# Patient Record
Sex: FEMALE | Race: WHITE | NOT HISPANIC OR LATINO | Employment: UNEMPLOYED | URBAN - METROPOLITAN AREA
[De-identification: names, ages, dates, MRNs, and addresses within clinical notes are randomized per-mention and may not be internally consistent; named-entity substitution may affect disease eponyms.]

---

## 2017-01-10 ENCOUNTER — OFFICE VISIT (OUTPATIENT)
Dept: OBGYN | Facility: CLINIC | Age: 22
End: 2017-01-10
Attending: OBSTETRICS & GYNECOLOGY
Payer: COMMERCIAL

## 2017-01-10 ENCOUNTER — TELEPHONE (OUTPATIENT)
Dept: OBGYN | Facility: CLINIC | Age: 22
End: 2017-01-10

## 2017-01-10 VITALS
HEART RATE: 91 BPM | DIASTOLIC BLOOD PRESSURE: 76 MMHG | SYSTOLIC BLOOD PRESSURE: 120 MMHG | BODY MASS INDEX: 37.28 KG/M2 | WEIGHT: 224 LBS

## 2017-01-10 DIAGNOSIS — O09.92 HRP (HIGH RISK PREGNANCY), SECOND TRIMESTER: Primary | ICD-10-CM

## 2017-01-10 DIAGNOSIS — B37.31 YEAST INFECTION OF THE VAGINA: ICD-10-CM

## 2017-01-10 LAB
DEPRECATED CALCIDIOL+CALCIFEROL SERPL-MC: 32 UG/L (ref 20–75)
ERYTHROCYTE [DISTWIDTH] IN BLOOD BY AUTOMATED COUNT: 12.6 % (ref 10–15)
GLUCOSE 1H P 50 G GLC PO SERPL-MCNC: 112 MG/DL (ref 60–129)
HCT VFR BLD AUTO: 35.9 % (ref 35–47)
HGB BLD-MCNC: 12.1 G/DL (ref 11.7–15.7)
MCH RBC QN AUTO: 31.9 PG (ref 26.5–33)
MCHC RBC AUTO-ENTMCNC: 33.7 G/DL (ref 31.5–36.5)
MCV RBC AUTO: 95 FL (ref 78–100)
PLATELET # BLD AUTO: 151 10E9/L (ref 150–450)
RBC # BLD AUTO: 3.79 10E12/L (ref 3.8–5.2)
T PALLIDUM IGG+IGM SER QL: NEGATIVE
WBC # BLD AUTO: 11.4 10E9/L (ref 4–11)

## 2017-01-10 PROCEDURE — 25000125 ZZHC RX 250: Mod: ZF

## 2017-01-10 PROCEDURE — 87491 CHLMYD TRACH DNA AMP PROBE: CPT | Performed by: OBSTETRICS & GYNECOLOGY

## 2017-01-10 PROCEDURE — 99212 OFFICE O/P EST SF 10 MIN: CPT | Mod: 25

## 2017-01-10 PROCEDURE — 82950 GLUCOSE TEST: CPT | Performed by: OBSTETRICS & GYNECOLOGY

## 2017-01-10 PROCEDURE — 36415 COLL VENOUS BLD VENIPUNCTURE: CPT | Performed by: OBSTETRICS & GYNECOLOGY

## 2017-01-10 PROCEDURE — 90471 IMMUNIZATION ADMIN: CPT | Mod: ZF

## 2017-01-10 PROCEDURE — 86780 TREPONEMA PALLIDUM: CPT | Performed by: OBSTETRICS & GYNECOLOGY

## 2017-01-10 PROCEDURE — 85027 COMPLETE CBC AUTOMATED: CPT | Performed by: OBSTETRICS & GYNECOLOGY

## 2017-01-10 PROCEDURE — 87591 N.GONORRHOEAE DNA AMP PROB: CPT | Performed by: OBSTETRICS & GYNECOLOGY

## 2017-01-10 PROCEDURE — 82306 VITAMIN D 25 HYDROXY: CPT | Performed by: OBSTETRICS & GYNECOLOGY

## 2017-01-10 PROCEDURE — 90715 TDAP VACCINE 7 YRS/> IM: CPT | Mod: ZF

## 2017-01-10 RX ORDER — FLUCONAZOLE 150 MG/1
150 TABLET ORAL ONCE
Qty: 1 TABLET | Refills: 1 | Status: SHIPPED
Start: 2017-01-10 | End: 2017-01-10

## 2017-01-10 RX ORDER — NICOTINE 21 MG/24HR
1 PATCH, TRANSDERMAL 24 HOURS TRANSDERMAL EVERY 24 HOURS
Qty: 30 PATCH | Refills: 1 | Status: ON HOLD
Start: 2017-01-10 | End: 2017-05-15

## 2017-01-10 ASSESSMENT — PAIN SCALES - GENERAL: PAINLEVEL: NO PAIN (0)

## 2017-01-10 NOTE — MR AVS SNAPSHOT
After Visit Summary   1/10/2017    Ileana Thorne    MRN: [id number]           Patient Information     Date Of Birth          1995        Visit Information        Provider Department      1/10/2017 1:15 PM Sonya Demarco MD [facility]        Today's Diagnoses     HRP (high risk pregnancy), second trimester    -  1       Care Instructions    Make appointment for growth US ASAP, follow up visit in 1-2 weeks for extended OB    Go to lab    Take diflucan, can erpeat in 3 days if discharge not improvnig        Follow-ups after your visit        Future tests that were ordered for you today     Open Future Orders        Priority Expected Expires Ordered    Growth Ultrasound 79520 Routine  5/10/2017 1/10/2017            Who to contact     Please call your clinic at [phone] to:    Ask questions about your health    Make or cancel appointments    Discuss your medicines    Learn about your test results    Speak to your doctor   If you have compliments or concerns about an experience at your clinic, or if you wish to file a complaint, please contact [facility] Patient Relations at [phone] or email us at [email or web address]         Additional Information About Your Visit        MyChart Information     [employer] is an electronic gateway that provides easy, online access to your medical records. With BO.LT, you can request a clinic appointment, read your test results, renew a prescription or communicate with your care team.     To sign up for [employer] visit the website at [email or web address]   You will be asked to enter the access code listed below, as well as some personal information. Please follow the directions to create your username and password.     Your access code is: [email or web address]  Expires: 3/27/2017  9:00 AM     Your access code will  in 90 days. If you need help or a new code, please contact your [facility] or call [phone] for assistance.        Care EveryWhere ID     This is your Care EveryWhere ID. This could be used by other organizations to access your [place] medical records  [id number]        Your Vitals Were     Pulse Last Period Breastfeeding?             91 06/04/2016 No          Blood Pressure from Last 3 Encounters:   01/10/17 120/76   12/29/16 120/74   11/18/16 120/68    Weight from Last 3 Encounters:   01/10/17 101.606 kg (224 lb)   12/29/16 104.327 kg (230 lb)   11/18/16 102.513 kg (226 lb)              We Performed the Following     25- OH-Vitamin D     Anti Treponema     CBC with Platelets     Glucose 1 Hour          Today's Medication Changes          These changes are accurate as of: 1/10/17  1:27 PM.  If you have any questions, ask your nurse or doctor.               Stop taking these medicines if you haven't already. Please contact your care team if you have questions.     hydrOXYzine 50 MG capsule   Commonly known as:  VISTARIL   Stopped by:  Sonya Demarco MD           nitrofurantoin (macrocrystal-monohydrate) 100 MG capsule   Commonly known as:  MACROBID   Stopped by:  Sonya Demarco MD                    Primary Care Provider Office Phone # Fax #    Calixto GARDNER MD Aaron [phone]       [place] [id number]        Thank you!     Thank you for choosing [facility]  for your care. Our goal is always to provide you with excellent care. Hearing back from our patients is one way we can continue to improve our services. Please take a few minutes to complete the written survey that you may receive in the mail after your visit with us. Thank you!             Your Updated Medication List - Protect others around you: Learn how to safely use, store and throw away your medicines at www.disposemymeds.org.          This list is accurate as of: 1/10/17  1:27 PM.  Always use your most recent  med list.                   Brand Name Dispense Instructions for use    ABILIFY PO      Take 5 mg by mouth daily       ALBUTEROL IN          aspirin 81 MG tablet     90 tablet    Take 1 tablet (81 mg) by mouth daily       doxylamine 25 MG Tabs tablet    UNISOM    30 each    Take 1 tablet (25 mg) by mouth At Bedtime       GABAPENTIN PO      Take 600 mg by mouth 4 times daily       METHADONE HCL PO      Take 70 mg by mouth daily       order for DME     1 each    Maternity Belt order       prenatal multivitamin  plus iron 27-0.8 MG Tabs per tablet      Take 1 tablet by mouth daily       VIIBRYD PO      Take 40 mg by mouth daily       zolpidem 5 MG tablet    AMBIEN    30 tablet    Take 1 tablet (5 mg) by mouth nightly as needed for sleep

## 2017-01-10 NOTE — TELEPHONE ENCOUNTER
Received call from Ileana asking for nicotine patch prescription. She reports not being able to afford buying them over-the-counter. Review of EPIC shows she has an appointment in clinic today. Advised she speak with the provider when she is here in clinic and she agreed with plan.

## 2017-01-10 NOTE — Clinical Note
1/11/2017         Ileana Thorne   [address] [address]        Dear Ms. Thorne:    I am happy to inform you that your recent complete blood count was normal. Your glucose screening was negative for diabetes and your infectious disease screening was negative for syphilis, gonorrhea and chlamydia.     Please return to clinic for routine prenatal care.     If you have any questions or concerns, please call the clinic at [phone].      Sincerely,      Carolyn Magdaleno MD  OB/GYN Resident

## 2017-01-10 NOTE — PROGRESS NOTES
Return OB visit    S: Patient doing okay.  She has not been seen since 24 weeks. Has had pelvic cramping similar to menstrual cramps over the last 4-5 weeks with vaginal discharge. No vaginal bleeding. + FM.     O: See flowsheet  Pelvic: Closed cervix. White/yellow vaginal discharge.     Wet prep with budding yeast.    Ileana Thorne is a 21 year old  at 31w3d by LMP c/w 9w4d US, here for return OB visit.    1. Nausea - controlled with zofran. Was seen in ER  for N/V/D without known source of gastroenteritis.   2. Anxiety and depression - currently stable on abilify, vilazodone. Report has not been to a psychiatrist since [facility] but would like another psychiatrist. She is working on this through [place].  3. Polysubstance abuse - (heroin, marijuana, methamphetamine), sober  Since 14, living at [facility], seeking sober living arrangement after delivery. Previously on subutex/Klonopin, now on methadone 115 mg through [facility]. Also smoking a pack a day, wants nicotine patch to quit. Discussed risks of nicotine patch, patient would still like patch.  4. Chronic HTN, History of preeclampsia - on ASA 81 mg daily. BPs normal  - Baseline Cr  (Cr 0.51). HELLP labs 10/22 were WNL.  5. Fetal Wellbeing:  - Normal anatomy scan  -Growth: 11/3- 523g @21w5d.   -q4 growth US for chronic HTN/marginal cord insertion. Instructed patient to schedule next.  6. Pain: Seems consistent with sciatic nerve pain. Recommend pregnancy support belt (ordered). If that does not help, then recommend PT referral  7. Prenatal care - GCT, RPR, CBC, vitamin D ordered today. Growth US ordered today. Patient was not scheduled for extended OB visit, therefore next visit needs to go over folder. TDaP given.  8. Yeast infection, given Diflucan prescription.    Needs to return in 1-2 weeks and be scheduled for extended OB visit to go over results and folder.    Sonya Demarco MD  Obstetrics  and Gynecology PGY-4     The Patient was seen in Resident Continuity Clinic by GIULIA ALLEN  I reviewed the history & exam. Assessment and plan were jointly made.    Kari Epps MD

## 2017-01-10 NOTE — PATIENT INSTRUCTIONS
Make appointment for growth US ASAP, follow up visit in 1-2 weeks for extended OB    Go to lab    Take diflucan, can erpeat in 3 days if discharge not improvnig

## 2017-01-10 NOTE — Clinical Note
1/10/2017       RE: Ileana Thorne  [address] [address]     Dear Colleague,    Thank you for referring your patient, Ileana Thorne, [facility] Please see a copy of my visit note below.    Return OB visit    S: Patient doing okay.  She has not been seen since 24 weeks. Has had pelvic cramping similar to menstrual cramps over the last 4-5 weeks with vaginal discharge. No vaginal bleeding. + FM.     O: See flowsheet  Pelvic: Closed cervix. White/yellow vaginal discharge.     Wet prep with budding yeast.    Ileana Thorne is a 21 year old  at 31w3d by LMP c/w 9w4d US, here for return OB visit.    1. Nausea - controlled with zofran. Was seen in ER  for N/V/D without known source of gastroenteritis.   2. Anxiety and depression - currently stable on abilify, vilazodone. Report has not been to a psychiatrist since [facility] but would like another psychiatrist. She is working on this through [place].  3. Polysubstance abuse - (heroin, marijuana, methamphetamine), sober  Since 14, living at [facility], seeking sober living arrangement after delivery. Previously on subutex/Klonopin, now on methadone 115 mg through [facility]. Also smoking a pack a day, wants nicotine patch to quit. Discussed risks of nicotine patch, patient would still like patch.  4. Chronic HTN, History of preeclampsia - on ASA 81 mg daily. BPs normal  - Baseline Cr  (Cr 0.51). HELLP labs 10/22 were WNL.  5. Fetal Wellbeing:  - Normal anatomy scan  -Growth: 11/3- 523g @21w5d.   -q4 growth US for chronic HTN/marginal cord insertion. Instructed patient to schedule next.  6. Pain: Seems consistent with sciatic nerve pain. Recommend pregnancy support belt (ordered). If that does not help, then recommend PT referral  7. Prenatal care - GCT, RPR, CBC, vitamin D ordered today. Growth US ordered today. Patient was not  scheduled for extended OB visit, therefore next visit needs to go over folder. TDaP given.  8. Yeast infection, given Diflucan prescription.    Needs to return in 1-2 weeks and be scheduled for extended OB visit to go over results and folder.    Sonya Allen MD  Obstetrics and Gynecology PGY-4     The Patient was seen in Resident Continuity Clinic by SONYA ALLEN.  I reviewed the history & exam. Assessment and plan were jointly made.    Kari Epps MD

## 2017-01-11 LAB
C TRACH DNA SPEC QL NAA+PROBE: NORMAL
N GONORRHOEA DNA SPEC QL NAA+PROBE: NORMAL
SPECIMEN SOURCE: NORMAL
SPECIMEN SOURCE: NORMAL

## 2017-01-23 ENCOUNTER — TELEPHONE (OUTPATIENT)
Dept: OBGYN | Facility: CLINIC | Age: 22
End: 2017-01-23

## 2017-01-23 NOTE — TELEPHONE ENCOUNTER
Telephone message received from patient with request to have an Ambien prescription sent to [facility]. She states she is no longer at the [facility] and would like this to help her. Request will be sent to Dr. Demarco

## 2017-01-24 NOTE — TELEPHONE ENCOUNTER
Patient needs visit before Ambien can be given. She has had limited care recently. She can have Vistaril 100 mg qhs PRN  Sonya Demarco MD  Obstetrics and Gynecology PGY-4

## 2017-01-24 NOTE — TELEPHONE ENCOUNTER
Patient declines rx for vistaril at this time and states it does not work for her. She is currently using unisom and melatonin at hs with no relief.     Advised she needs to then schedule [facility] if she would like to discuss ambien prescription, forwarded her to  to schedule.

## 2017-01-30 ENCOUNTER — TELEPHONE (OUTPATIENT)
Dept: OBGYN | Facility: CLINIC | Age: 22
End: 2017-01-30

## 2017-01-30 ENCOUNTER — OFFICE VISIT (OUTPATIENT)
Dept: OBGYN | Facility: CLINIC | Age: 22
End: 2017-01-30
Attending: OBSTETRICS & GYNECOLOGY
Payer: COMMERCIAL

## 2017-01-30 DIAGNOSIS — O09.92 HRP (HIGH RISK PREGNANCY), SECOND TRIMESTER: ICD-10-CM

## 2017-01-30 PROCEDURE — 76816 OB US FOLLOW-UP PER FETUS: CPT | Mod: ZF

## 2017-01-30 NOTE — PROGRESS NOTES
21 year old female, ,presents at 34 2/7 weeks for obstetric ultrasound assessment indicated by chronic hypertension, marginal umbilical cord insertion.    Single fetus    Presentation - cephalic    USEGA = 34 3/7 weeks.  EFW = 2,532 grams, 61 % for 34 weeks.      KENNETH = 14.6cm.  FHR = 134bpm     Placenta anterior and grade 2      Findings discussed with patient.    Further studies with growth u/s q 4 wks.    BRODERICK Montejo MD, FACOG  [facility]  OB/GYN    2017  3:17 PM

## 2017-01-30 NOTE — Clinical Note
2017       RE: Ileana Thorne  [address] [address]     Dear Colleague,    Thank you for referring your patient, Ileana Thorne, to the [facility] at [facility]. Please see a copy of my visit note below.    21 year old female, ,presents at 34 2/7 weeks for obstetric ultrasound assessment indicated by chronic hypertension, marginal umbilical cord insertion.    Single fetus    Presentation - cephalic    USEGA = 34 3/7 weeks.  EFW = 2,532 grams, 61 % for 34 weeks.      KENNETH = 14.6cm.  FHR = 134bpm     Placenta anterior and grade 2      Findings discussed with patient.    Further studies with growth u/s q 4 wks.    BRODERICK Montejo MD, FACOG  [facility]  OB/GYN    2017  3:17 PM      Again, thank you for allowing me to participate in the care of your patient.      Sincerely,    [facility]

## 2017-01-30 NOTE — TELEPHONE ENCOUNTER
Pt was in clinic for ultrasound and told tech she is feeling very depressed and wanted to talk with someone.    Met with Kimberlyn who reports she has dx of PTSD and anxiety with depression. She is currently living in a treatment facility for heroin recovery. She reports she does not have a psychiatrist or therapist. Is taking Vibrid 60mg daily.     She reports depression is 7/10 on 0-10 scale, NO active thoughts of suicide. Anxiety at 10/10 on 0-10 scale. She reports 'telling everyone' how she feels to help get some services in place.    Advised she go to [facility] to get connected to services and additional medication. This was declined as she had a very active toddler with her who was becoming unhappy. Offered immediate appointment with psychologist, Eliana Pulido, and she agreed and went to [facility] to wait. Also offered to call on-call MD, Dr. Cordero, for anything further. She agreed with plan.    Psychologist agreed to see pt but when nurse went back out to [facility] Ileana had left, telling the  that she's fine to wait until Thursday (Feb 2, her next scheduled clinic appointment).    Spoke with Dr. Cordero who advised she should be seen before any further medication is ordered. Will discuss with her at appointment on Thursday.    Tried to reach Ileana but received personal voicemail.  Left message regarding Dr. Cordero recommendation. Advised again that if she is able should go to [facility]. Psychologist, Eliana Pulido, can see her Monday, Feb 6 at 12 noon. Also provided phone number and brief information for the [facility].    Asked her to call back to confirm appointment with psychologist.

## 2017-02-02 ENCOUNTER — OFFICE VISIT (OUTPATIENT)
Dept: OBGYN | Facility: CLINIC | Age: 22
End: 2017-02-02
Attending: OBSTETRICS & GYNECOLOGY
Payer: MEDICAID

## 2017-02-02 VITALS
HEART RATE: 99 BPM | HEIGHT: 65 IN | WEIGHT: 230.3 LBS | DIASTOLIC BLOOD PRESSURE: 85 MMHG | BODY MASS INDEX: 38.37 KG/M2 | SYSTOLIC BLOOD PRESSURE: 136 MMHG

## 2017-02-02 DIAGNOSIS — O09.93 HRP (HIGH RISK PREGNANCY), THIRD TRIMESTER: Primary | ICD-10-CM

## 2017-02-02 PROCEDURE — 99212 OFFICE O/P EST SF 10 MIN: CPT | Mod: ZF

## 2017-02-02 NOTE — Clinical Note
"2017       RE: Ileana Thorne  [address] [address]     Dear Colleague,    Thank you for referring your patient, Ileana Thorne, to the [facility] at [facility]. Please see a copy of my visit note below.    [facility] Return OB Visit    S: Ileana presents with her two year old son to clinic for return OB visit. She says that lately she has been feeling more depressed. She denies thoughts of self harm. Has been maintained on 60 mg vibrid and 600 QID of gabapentin, which she feels like has not been controlling her symptoms. She is meeting with a new psychiatrist hopefully this week. She also reports trouble sleeping and has tried vistaril and melatonin. She is on methadone and takes 55/60 mg split dose from [place]    O:   /85 mmHg  Pulse 99  Ht 1.651 m (5' 5\")  Wt 104.463 kg (230 lb 4.8 oz)  BMI 38.32 kg/m2  LMP 2016    See OB flowsheet    Growth US 17:  Presentation - cephalic  USEGA = 34 3/7 weeks.  EFW = 2,532 grams, 61 % for 34 weeks.  KENNETH = 14.6cm.  FHR = 134bpm  Placenta anterior and grade 2    A/P: Ileana Thorne is a 21 year old  at 34w5d by LMP c/w 9w4d US, here for return OB visit in pregnancy complicated by cHTN, polysubstance abuse on methadone, anxiety and depression and marginal cord insertion.     # Anxiety and depression - currently on abilify and vilazodone. Patient meeting with new psychiatrist soon to discuss medication regimen. Discussed resources if patient finds herself unable to cope or having suicidal ideation ([facility] on [place]).    # Polysubstance abuse - (heroin, marijuana, methamphetamine), sober  Since 14, living at [facility] outpatient [facility], seeking sober living arrangement after delivery. Previously on subutex/Klonopin, now on methadone 115 mg through [facility]. Using nicotine patch for smoking cessation. Offered for patient to meet with  today and she is interested, " although she says she doesn't have time today so will do it next time.    # Chronic HTN, History of preeclampsia - on ASA 81 mg daily. BPs normal  - Baseline Cr 8/26 (Cr 0.51). HELLP labs 10/22 were WNL.    # Fetal Wellbeing:  - Normal anatomy scan  -Growth: 1/30- 2532g at 34w2d.   -q4 growth US for chronic HTN/marginal cord insertion. Instructed patient to schedule next.    # Prenatal care - passed GCT (112). Hgb wnl, vit D wnl, RPR neg. S/p TDaP. GBS at next visit.     Return to clinic in 1 week for OB visit. Patient staffed with Dr. Bolivar.    Louise Ibarra MD  OB/GYN Resident PGY3  Pager [phone]  02/02/2017    The Patient was seen in Resident Continuity Clinic by IRMA IBARRA.  I reviewed the history & exam. Assessment and plan were jointly made.    Lidia Bolivar MD          Again, thank you for allowing me to participate in the care of your patient.      Sincerely,    Irma Ibarra MD

## 2017-02-02 NOTE — MR AVS SNAPSHOT
"              After Visit Summary   2017    Ileana Thorne    MRN: [id number]           Patient Information     Date Of Birth          1995        Visit Information        Provider Department      2017 2:30 PM Riri Bernstein MD [facility]         Follow-ups after your visit        Who to contact     Please call your clinic at [phone] to:    Ask questions about your health    Make or cancel appointments    Discuss your medicines    Learn about your test results    Speak to your doctor   If you have compliments or concerns about an experience at your clinic, or if you wish to file a complaint, please contact [facility] Patient Relations at [phone] or email us at [email or web address]         Additional Information About Your Visit        MyChart Information     [email or web address] is an electronic gateway that provides easy, online access to your medical records. With [email or web address], you can request a clinic appointment, read your test results, renew a prescription or communicate with your care team.     To sign up for [email or web address] visit the website at [email or web address]   You will be asked to enter the access code listed below, as well as some personal information. Please follow the directions to create your username and password.     Your access code is: [email or web address]  Expires: 3/27/2017  9:00 AM     Your access code will  in 90 days. If you need help or a new code, please contact your [facility] or call [phone] for assistance.        Care EveryWhere ID     This is your Care EveryWhere ID. This could be used by other organizations to access your [place] medical records  [id number]        Your Vitals Were     Pulse Height BMI (Body Mass Index) Last Period          99 1.651 m (5' 5\") 38.32 kg/m2 2016         Blood Pressure from Last 3 Encounters:   17 136/85   01/10/17 120/76 "   12/29/16 120/74    Weight from Last 3 Encounters:   02/02/17 104.463 kg (230 lb 4.8 oz)   01/10/17 101.606 kg (224 lb)   12/29/16 104.327 kg (230 lb)              Today, you had the following     No orders found for display       Primary Care Provider Office Phone # Fax #    Jembatsheva JJ Walker [phone]       [place] [address] [address] [facility] [address]        Thank you!     Thank you for choosing [facility]  for your care. Our goal is always to provide you with excellent care. Hearing back from our patients is one way we can continue to improve our services. Please take a few minutes to complete the written survey that you may receive in the mail after your visit with us. Thank you!             Your Updated Medication List - Protect others around you: Learn how to safely use, store and throw away your medicines at www.disposemymeds.org.          This list is accurate as of: 2/2/17  3:01 PM.  Always use your most recent med list.                   Brand Name Dispense Instructions for use    ABILIFY PO      Take 5 mg by mouth daily       ALBUTEROL IN          aspirin 81 MG tablet     90 tablet    Take 1 tablet (81 mg) by mouth daily       doxylamine 25 MG Tabs tablet    UNISOM    30 each    Take 1 tablet (25 mg) by mouth At Bedtime       GABAPENTIN PO      Take 600 mg by mouth 4 times daily       METHADONE HCL PO      Take 70 mg by mouth daily       nicotine 21 MG/24HR 24 hr patch    NICODERM CQ    30 patch    Place 1 patch onto the skin every 24 hours       order for DME     1 each    Maternity Belt order       prenatal multivitamin  plus iron 27-0.8 MG Tabs per tablet      Take 1 tablet by mouth daily       VIIBRYD PO      Take 40 mg by mouth daily       zolpidem 5 MG tablet    AMBIEN    30 tablet    Take 1 tablet (5 mg) by mouth nightly as needed for sleep

## 2017-02-02 NOTE — PROGRESS NOTES
"S Return OB Visit    S: Ileana presents with her two year old son to clinic for return OB visit. She says that lately she has been feeling more depressed. She denies thoughts of self harm. Has been maintained on 60 mg vibrid and 600 QID of gabapentin, which she feels like has not been controlling her symptoms. She is meeting with a new psychiatrist hopefully this week. She also reports trouble sleeping and has tried vistaril and melatonin. She is on methadone and takes 55/60 mg split dose from [place]    O:   /85 mmHg  Pulse 99  Ht 1.651 m (5' 5\")  Wt 104.463 kg (230 lb 4.8 oz)  BMI 38.32 kg/m2  LMP 2016    See OB flowsheet    Growth US 17:  Presentation - cephalic  USEGA = 34 3/7 weeks.  EFW = 2,532 grams, 61 % for 34 weeks.  KENNETH = 14.6cm.  FHR = 134bpm  Placenta anterior and grade 2    A/P: Ileana Thorne is a 21 year old  at 34w5d by LMP c/w 9w4d US, here for return OB visit in pregnancy complicated by cHTN, polysubstance abuse on methadone, anxiety and depression and marginal cord insertion.     # Anxiety and depression - currently on abilify and vilazodone. Patient meeting with new psychiatrist soon to discuss medication regimen. Discussed resources if patient finds herself unable to cope or having suicidal ideation ([facility] on [place]).    # Polysubstance abuse - (heroin, marijuana, methamphetamine), sober  Since 14, living at [facility] [place], seeking sober living arrangement after delivery. Previously on subutex/Klonopin, now on methadone 115 mg through [facility]. Using nicotine patch for smoking cessation. Offered for patient to meet with  today and she is interested, although she says she doesn't have time today so will do it next time.    # Chronic HTN, History of preeclampsia - on ASA 81 mg daily. BPs normal  - Baseline Cr  (Cr 0.51). HELLP labs 10/22 were WNL.    # Fetal Wellbeing:  - Normal anatomy scan  -Growth: - 2532g at 34w2d.   -q4 " growth US for chronic HTN/marginal cord insertion. Instructed patient to schedule next.    # Prenatal care - passed GCT (112). Hgb wnl, vit D wnl, RPR neg. S/p TDaP. GBS at next visit.     Return to clinic in 1 week for OB visit. Patient staffed with Dr. Bolivar.    Louise Ibarra MD  OB/GYN Resident PGY3  Pager [phone]  02/02/2017    The Patient was seen in Resident Continuity Clinic by IRMA IBARRA.  I reviewed the history & exam. Assessment and plan were jointly made.    Lidia Bolivar MD

## 2017-02-02 NOTE — NURSING NOTE
Chief Complaint   Patient presents with     Prenatal Care     34 weeks and 5 days       Raysa Lopez, [facility] 2/2/2017

## 2017-02-03 NOTE — TELEPHONE ENCOUNTER
Tried to reach Ileana but received voicemail.  Left message to call back regarding if she would like to see psychologist on Monday, Feb 6.

## 2017-02-09 ENCOUNTER — TELEPHONE (OUTPATIENT)
Dept: OBGYN | Facility: CLINIC | Age: 22
End: 2017-02-09

## 2017-02-09 DIAGNOSIS — O92.70 LACTATION DISORDER: Primary | ICD-10-CM

## 2017-02-09 RX ORDER — BREAST PUMP
1 EACH MISCELLANEOUS DAILY PRN
Qty: 1 EACH | Refills: 0 | Status: ON HOLD | OUTPATIENT
Start: 2017-02-09 | End: 2017-05-15

## 2017-02-09 NOTE — TELEPHONE ENCOUNTER
Telephone call from [employer] calling on behalf of Ileana, requesting a breast pump order be faxed to them at [phone]. Printed, signed and faxed as requested.

## 2017-02-18 ENCOUNTER — ANESTHESIA (OUTPATIENT)
Dept: OBGYN | Facility: CLINIC | Age: 22
End: 2017-02-18
Payer: MEDICAID

## 2017-02-18 ENCOUNTER — ANESTHESIA EVENT (OUTPATIENT)
Dept: OBGYN | Facility: CLINIC | Age: 22
End: 2017-02-18
Payer: MEDICAID

## 2017-02-18 ENCOUNTER — HOSPITAL ENCOUNTER (INPATIENT)
Facility: CLINIC | Age: 22
LOS: 3 days | Discharge: HOME | End: 2017-02-21
Attending: OBSTETRICS & GYNECOLOGY | Admitting: OBSTETRICS & GYNECOLOGY
Payer: MEDICAID

## 2017-02-18 ENCOUNTER — TELEPHONE (OUTPATIENT)
Dept: OTHER | Facility: CLINIC | Age: 22
End: 2017-02-18

## 2017-02-18 DIAGNOSIS — Z30.8 ENCOUNTER FOR OTHER CONTRACEPTIVE MANAGEMENT: ICD-10-CM

## 2017-02-18 DIAGNOSIS — F41.9 ANXIETY: ICD-10-CM

## 2017-02-18 DIAGNOSIS — F41.1 GENERALIZED ANXIETY DISORDER: ICD-10-CM

## 2017-02-18 PROBLEM — Z37.9 NORMAL LABOR: Status: ACTIVE | Noted: 2017-02-18

## 2017-02-18 PROBLEM — Z34.90 PREGNANCY: Status: ACTIVE | Noted: 2017-02-18

## 2017-02-18 PROBLEM — Z36.89 ENCOUNTER FOR TRIAGE IN PREGNANT PATIENT: Status: ACTIVE | Noted: 2017-02-18

## 2017-02-18 LAB
ALBUMIN UR-MCNC: NEGATIVE MG/DL
ALT SERPL W P-5'-P-CCNC: 24 U/L (ref 0–50)
APPEARANCE UR: CLEAR
AST SERPL W P-5'-P-CCNC: 16 U/L (ref 0–45)
BACTERIA #/AREA URNS HPF: ABNORMAL /HPF
BILIRUB UR QL STRIP: NEGATIVE
COLOR UR AUTO: YELLOW
CREAT SERPL-MCNC: 0.64 MG/DL (ref 0.52–1.04)
ERYTHROCYTE [DISTWIDTH] IN BLOOD BY AUTOMATED COUNT: 12.9 % (ref 10–15)
GFR SERPL CREATININE-BSD FRML MDRD: NORMAL ML/MIN/1.7M2
GLUCOSE UR STRIP-MCNC: NEGATIVE MG/DL
HCT VFR BLD AUTO: 36.6 % (ref 35–47)
HGB BLD-MCNC: 12.4 G/DL (ref 11.7–15.7)
HGB UR QL STRIP: NEGATIVE
KETONES UR STRIP-MCNC: NEGATIVE MG/DL
LEUKOCYTE ESTERASE UR QL STRIP: ABNORMAL
MCH RBC QN AUTO: 32.2 PG (ref 26.5–33)
MCHC RBC AUTO-ENTMCNC: 33.9 G/DL (ref 31.5–36.5)
MCV RBC AUTO: 95 FL (ref 78–100)
MUCOUS THREADS #/AREA URNS LPF: PRESENT /LPF
NITRATE UR QL: NEGATIVE
PH UR STRIP: 6 PH (ref 5–7)
PLATELET # BLD AUTO: 141 10E9/L (ref 150–450)
RBC # BLD AUTO: 3.85 10E12/L (ref 3.8–5.2)
RBC #/AREA URNS AUTO: 7 /HPF (ref 0–2)
SP GR UR STRIP: 1.02 (ref 1–1.03)
SQUAMOUS #/AREA URNS AUTO: 3 /HPF (ref 0–1)
URN SPEC COLLECT METH UR: ABNORMAL
UROBILINOGEN UR STRIP-MCNC: NORMAL MG/DL (ref 0–2)
WBC # BLD AUTO: 11.7 10E9/L (ref 4–11)
WBC #/AREA URNS AUTO: 5 /HPF (ref 0–2)

## 2017-02-18 PROCEDURE — 81001 URINALYSIS AUTO W/SCOPE: CPT | Performed by: OBSTETRICS & GYNECOLOGY

## 2017-02-18 PROCEDURE — 25000125 ZZHC RX 250

## 2017-02-18 PROCEDURE — 00HU33Z INSERTION OF INFUSION DEVICE INTO SPINAL CANAL, PERCUTANEOUS APPROACH: ICD-10-PCS | Performed by: ANESTHESIOLOGY

## 2017-02-18 PROCEDURE — 25000132 ZZH RX MED GY IP 250 OP 250 PS 637: Performed by: OBSTETRICS & GYNECOLOGY

## 2017-02-18 PROCEDURE — 86780 TREPONEMA PALLIDUM: CPT | Performed by: OBSTETRICS & GYNECOLOGY

## 2017-02-18 PROCEDURE — 82565 ASSAY OF CREATININE: CPT | Performed by: OBSTETRICS & GYNECOLOGY

## 2017-02-18 PROCEDURE — 25800025 ZZH RX 258: Performed by: OBSTETRICS & GYNECOLOGY

## 2017-02-18 PROCEDURE — 86850 RBC ANTIBODY SCREEN: CPT | Performed by: OBSTETRICS & GYNECOLOGY

## 2017-02-18 PROCEDURE — 36415 COLL VENOUS BLD VENIPUNCTURE: CPT | Performed by: OBSTETRICS & GYNECOLOGY

## 2017-02-18 PROCEDURE — 85027 COMPLETE CBC AUTOMATED: CPT | Performed by: OBSTETRICS & GYNECOLOGY

## 2017-02-18 PROCEDURE — 84460 ALANINE AMINO (ALT) (SGPT): CPT | Performed by: OBSTETRICS & GYNECOLOGY

## 2017-02-18 PROCEDURE — 86900 BLOOD TYPING SEROLOGIC ABO: CPT | Performed by: OBSTETRICS & GYNECOLOGY

## 2017-02-18 PROCEDURE — 80307 DRUG TEST PRSMV CHEM ANLYZR: CPT | Performed by: OBSTETRICS & GYNECOLOGY

## 2017-02-18 PROCEDURE — 86901 BLOOD TYPING SEROLOGIC RH(D): CPT | Performed by: OBSTETRICS & GYNECOLOGY

## 2017-02-18 PROCEDURE — 84156 ASSAY OF PROTEIN URINE: CPT | Performed by: OBSTETRICS & GYNECOLOGY

## 2017-02-18 PROCEDURE — 12000028 ZZH R&B OB UMMC

## 2017-02-18 PROCEDURE — 25000125 ZZHC RX 250: Performed by: ANESTHESIOLOGY

## 2017-02-18 PROCEDURE — 99215 OFFICE O/P EST HI 40 MIN: CPT

## 2017-02-18 PROCEDURE — 3E0R3CZ INTRODUCTION OF REGIONAL ANESTHETIC INTO SPINAL CANAL, PERCUTANEOUS APPROACH: ICD-10-PCS | Performed by: ANESTHESIOLOGY

## 2017-02-18 PROCEDURE — 84450 TRANSFERASE (AST) (SGOT): CPT | Performed by: OBSTETRICS & GYNECOLOGY

## 2017-02-18 RX ORDER — OXYTOCIN/0.9 % SODIUM CHLORIDE 30/500 ML
PLASTIC BAG, INJECTION (ML) INTRAVENOUS
Status: DISCONTINUED
Start: 2017-02-18 | End: 2017-02-18 | Stop reason: HOSPADM

## 2017-02-18 RX ORDER — OXYTOCIN 10 [USP'U]/ML
INJECTION, SOLUTION INTRAMUSCULAR; INTRAVENOUS
Status: DISCONTINUED
Start: 2017-02-18 | End: 2017-02-19 | Stop reason: WASHOUT

## 2017-02-18 RX ORDER — NALOXONE HYDROCHLORIDE 0.4 MG/ML
.1-.4 INJECTION, SOLUTION INTRAMUSCULAR; INTRAVENOUS; SUBCUTANEOUS
Status: DISCONTINUED | OUTPATIENT
Start: 2017-02-18 | End: 2017-02-19

## 2017-02-18 RX ORDER — FENTANYL CITRATE 50 UG/ML
15 INJECTION, SOLUTION INTRAMUSCULAR; INTRAVENOUS ONCE
Status: COMPLETED | OUTPATIENT
Start: 2017-02-18 | End: 2017-02-18

## 2017-02-18 RX ORDER — SODIUM CHLORIDE, SODIUM LACTATE, POTASSIUM CHLORIDE, CALCIUM CHLORIDE 600; 310; 30; 20 MG/100ML; MG/100ML; MG/100ML; MG/100ML
INJECTION, SOLUTION INTRAVENOUS
Status: DISCONTINUED
Start: 2017-02-18 | End: 2017-02-18 | Stop reason: HOSPADM

## 2017-02-18 RX ORDER — OXYTOCIN 10 [USP'U]/ML
10 INJECTION, SOLUTION INTRAMUSCULAR; INTRAVENOUS
Status: DISCONTINUED | OUTPATIENT
Start: 2017-02-18 | End: 2017-02-19

## 2017-02-18 RX ORDER — LIDOCAINE 40 MG/G
CREAM TOPICAL
Status: DISCONTINUED | OUTPATIENT
Start: 2017-02-18 | End: 2017-02-19

## 2017-02-18 RX ORDER — ONDANSETRON 2 MG/ML
4 INJECTION INTRAMUSCULAR; INTRAVENOUS EVERY 6 HOURS PRN
Status: DISCONTINUED | OUTPATIENT
Start: 2017-02-18 | End: 2017-02-19

## 2017-02-18 RX ORDER — BUPIVACAINE HYDROCHLORIDE 2.5 MG/ML
INJECTION, SOLUTION EPIDURAL; INFILTRATION; INTRACAUDAL PRN
Status: DISCONTINUED | OUTPATIENT
Start: 2017-02-18 | End: 2017-03-03

## 2017-02-18 RX ORDER — IBUPROFEN 800 MG/1
800 TABLET, FILM COATED ORAL
Status: COMPLETED | OUTPATIENT
Start: 2017-02-18 | End: 2017-02-19

## 2017-02-18 RX ORDER — FENTANYL CITRATE 50 UG/ML
INJECTION, SOLUTION INTRAMUSCULAR; INTRAVENOUS
Status: DISCONTINUED
Start: 2017-02-18 | End: 2017-02-19 | Stop reason: HOSPADM

## 2017-02-18 RX ORDER — ACETAMINOPHEN 325 MG/1
975 TABLET ORAL ONCE
Status: COMPLETED | OUTPATIENT
Start: 2017-02-18 | End: 2017-02-18

## 2017-02-18 RX ORDER — LIDOCAINE HYDROCHLORIDE AND EPINEPHRINE 15; 5 MG/ML; UG/ML
INJECTION, SOLUTION EPIDURAL PRN
Status: DISCONTINUED | OUTPATIENT
Start: 2017-02-18 | End: 2017-03-03

## 2017-02-18 RX ORDER — OXYTOCIN/0.9 % SODIUM CHLORIDE 30/500 ML
100-340 PLASTIC BAG, INJECTION (ML) INTRAVENOUS CONTINUOUS PRN
Status: COMPLETED | OUTPATIENT
Start: 2017-02-18 | End: 2017-02-19

## 2017-02-18 RX ORDER — OXYCODONE AND ACETAMINOPHEN 5; 325 MG/1; MG/1
1 TABLET ORAL
Status: DISCONTINUED | OUTPATIENT
Start: 2017-02-18 | End: 2017-02-19

## 2017-02-18 RX ORDER — FENTANYL CITRATE 50 UG/ML
100 INJECTION, SOLUTION INTRAMUSCULAR; INTRAVENOUS
Status: DISCONTINUED | OUTPATIENT
Start: 2017-02-18 | End: 2017-02-19

## 2017-02-18 RX ORDER — CARBOPROST TROMETHAMINE 250 UG/ML
250 INJECTION, SOLUTION INTRAMUSCULAR
Status: DISCONTINUED | OUTPATIENT
Start: 2017-02-18 | End: 2017-02-19

## 2017-02-18 RX ORDER — METHYLERGONOVINE MALEATE 0.2 MG/ML
200 INJECTION INTRAVENOUS
Status: DISCONTINUED | OUTPATIENT
Start: 2017-02-18 | End: 2017-02-19

## 2017-02-18 RX ORDER — SODIUM CHLORIDE, SODIUM LACTATE, POTASSIUM CHLORIDE, CALCIUM CHLORIDE 600; 310; 30; 20 MG/100ML; MG/100ML; MG/100ML; MG/100ML
INJECTION, SOLUTION INTRAVENOUS CONTINUOUS
Status: DISCONTINUED | OUTPATIENT
Start: 2017-02-18 | End: 2017-02-19

## 2017-02-18 RX ORDER — GABAPENTIN 300 MG/1
600 CAPSULE ORAL 4 TIMES DAILY
Status: DISCONTINUED | OUTPATIENT
Start: 2017-02-18 | End: 2017-02-19

## 2017-02-18 RX ORDER — ACETAMINOPHEN 325 MG/1
650 TABLET ORAL EVERY 4 HOURS PRN
Status: DISCONTINUED | OUTPATIENT
Start: 2017-02-18 | End: 2017-02-19

## 2017-02-18 RX ORDER — NALBUPHINE HYDROCHLORIDE 10 MG/ML
2.5-5 INJECTION, SOLUTION INTRAMUSCULAR; INTRAVENOUS; SUBCUTANEOUS EVERY 6 HOURS PRN
Status: DISCONTINUED | OUTPATIENT
Start: 2017-02-18 | End: 2017-02-19

## 2017-02-18 RX ORDER — NALOXONE HYDROCHLORIDE 0.4 MG/ML
.1-.4 INJECTION, SOLUTION INTRAMUSCULAR; INTRAVENOUS; SUBCUTANEOUS
Status: DISCONTINUED | OUTPATIENT
Start: 2017-02-18 | End: 2017-02-18

## 2017-02-18 RX ORDER — EPHEDRINE SULFATE 50 MG/ML
INJECTION, SOLUTION INTRAMUSCULAR; INTRAVENOUS; SUBCUTANEOUS
Status: DISCONTINUED
Start: 2017-02-18 | End: 2017-02-19 | Stop reason: WASHOUT

## 2017-02-18 RX ORDER — EPHEDRINE SULFATE 50 MG/ML
5 INJECTION, SOLUTION INTRAMUSCULAR; INTRAVENOUS; SUBCUTANEOUS
Status: DISCONTINUED | OUTPATIENT
Start: 2017-02-18 | End: 2017-02-19

## 2017-02-18 RX ADMIN — SODIUM CHLORIDE, POTASSIUM CHLORIDE, SODIUM LACTATE AND CALCIUM CHLORIDE 1000 ML: 600; 310; 30; 20 INJECTION, SOLUTION INTRAVENOUS at 22:40

## 2017-02-18 RX ADMIN — ACETAMINOPHEN 975 MG: 325 TABLET, FILM COATED ORAL at 21:51

## 2017-02-18 RX ADMIN — Medication 10 ML/HR: at 23:17

## 2017-02-18 RX ADMIN — BUPIVACAINE HYDROCHLORIDE 0.5 ML: 2.5 INJECTION, SOLUTION EPIDURAL; INFILTRATION; INTRACAUDAL at 23:13

## 2017-02-18 RX ADMIN — LIDOCAINE HYDROCHLORIDE,EPINEPHRINE BITARTRATE 3 ML: 15; .005 INJECTION, SOLUTION EPIDURAL; INFILTRATION; INTRACAUDAL; PERINEURAL at 23:15

## 2017-02-18 RX ADMIN — FENTANYL CITRATE 15 MCG: 50 INJECTION, SOLUTION INTRAMUSCULAR; INTRAVENOUS at 23:13

## 2017-02-18 RX ADMIN — SODIUM CHLORIDE, POTASSIUM CHLORIDE, SODIUM LACTATE AND CALCIUM CHLORIDE: 600; 310; 30; 20 INJECTION, SOLUTION INTRAVENOUS at 23:25

## 2017-02-18 RX ADMIN — GABAPENTIN 600 MG: 300 CAPSULE ORAL at 21:29

## 2017-02-18 ASSESSMENT — LIFESTYLE VARIABLES: TOBACCO_USE: 1

## 2017-02-18 NOTE — LETTER
[facility]  [address] [facility] [address]  Phone: [phone]    February 21, 2017    Ileana Thorne  [address] [address]      To whom it may concern:    RE: Ileana Thorne    This patient was admitted under our care for delivery of an infant 2/18/17 through 2/21/17.  During this time, she did receive her methadone treatment dispensed by our pharmacy at a dosage of 115 mg daily, in a split dose fashion.      Please contact me for questions or concerns.      Sincerely,      Deepti Meyers MD, MPH, FACOG

## 2017-02-18 NOTE — PLAN OF CARE
Pt to BP for evaluation of labor. Reports cramping, occas ctx, quarter size mucous with perimeter of bright red and pink blood around 1300. Nothing in vagina recently. Also reports decreased FM today. Pt taking methadone last taken shortly after arrival today. MD currently in delivery will update upon delivery.

## 2017-02-18 NOTE — TELEPHONE ENCOUNTER
Patient is a 22 yo  @ 37w0d who calls in this evening with concerns of a bloody/clear discharge that she thinks is her mucus plus.  Associated with this, she feels that she is having constant back pain as well as decreased fetal movement.  She denies LOF.  She denies HA, scotoma but does feel like her legs are really heavy and more swollen than normal.  She has a complicated history of Chronic HTN, history of pre-eclampsia in last pregnancy, history of polysubstance abuse on methadone, anxiety and depression.  Given her symptoms and history recommended she come to [facility] for labor and pre-eclampsia evaluation.  Patient understands and is agreeable with this plan.  Deepti Meyers MD

## 2017-02-18 NOTE — IP AVS SNAPSHOT
[facility]    [address] [facility] [address]    Phone:  [phone]                                       After Visit Summary   2/18/2017    Ileana Thorne    MRN: [id number]           After Visit Summary Signature Page     I have received my discharge instructions, and my questions have been answered. I have discussed any challenges I see with this plan with the nurse or doctor.    ..........................................................................................................................................  Patient/Patient Representative Signature      ..........................................................................................................................................  Patient Representative Print Name and Relationship to Patient    ..................................................               ................................................  Date                                            Time    ..........................................................................................................................................  Reviewed by Signature/Title    ...................................................              ..............................................  Date                                                            Time

## 2017-02-18 NOTE — IP AVS SNAPSHOT
MRN:[id number]                      After Visit Summary   2/18/2017    Ileana Thorne    MRN: [id number]           Thank you!     Thank you for choosing [facility] for your care. Our goal is always to provide you with excellent care. Hearing back from our patients is one way we can continue to improve our services. Please take a few minutes to complete the written survey that you may receive in the mail after you visit with us. Thank you!        Patient Information     Date Of Birth          1995        About your hospital stay     You were admitted on:  February 18, 2017 [facility]    You were discharged on:  February 21, 2017        Reason for your hospital stay       To have a baby                  Who to Call     For medical emergencies, please call 911.  For non-urgent questions about your medical care, please call your primary care provider or clinic, None          Attending Provider     Provider Specialty    Deepti Meyers MD OB/Gyn       Primary Care Provider    Physician No Ref-Primary       No address on file        After Care Instructions     Activity       Review discharge instructions            Diet       Resume previous diet            Discharge Instructions       Call the [facility] at [phone] or return to the ED if you have any of the following:    - Temperature greater than 100.4F  - Pain not controlled by pain medications  - any symptoms or preeclampsia, including: Severe headache, visual changes, chest pain, shortness of breath, pain in the upper right abdomen, or sudden increase in swelling  - Uncontrolled nausea/vomiting  - Foul-smelling vaginal discharge  - Vaginal bleeding soaking 1 pad per hour for 2 hours in a row            Discharge Instructions - Postpartum visit       Schedule postpartum visit with your provider and return to clinic in 6 weeks.                  Follow-up Appointments     Adult [facility]  Follow-up and recommended labs and tests       Please follow-up in 1 week for a mood check and in 6 weeks for a post partum visit.    Appointments on [place] and/or [facility] (with [facility] or [facility] provider or service). Call [phone] if you haven't heard regarding these appointments within 7 days of discharge.                  Further instructions from your care team       Postpartum Vaginal Delivery Instructions    Activity       Ask family and friends for help when you need it.    Do not place anything in your vagina for 6 weeks.    You are not restricted on other activities, but take it easy for a few weeks to allow your body to recover from delivery.  You are able to do any activities you feel up to that point.    No driving until you have stopped taking your pain medications (usually two weeks after delivery).     Call your health care provider if you have any of these symptoms:       Increased pain, swelling, redness, or fluid around your stiches from an episiotomy or perineal tear.    A fever above 100.4 F (38 C) with or without chills when placing a thermometer under your tongue.    You soak a sanitary pad with blood within 1 hour, or you see blood clots larger than a golf ball.    Bleeding that lasts more than 6 weeks.    Vaginal discharge that smells bad.    Severe pain, cramping or tenderness in your lower belly area.    A need to urinate more frequently (use the toilet more often), more urgently (use the toilet very quickly), or it burns when you urinate.    Nausea and vomiting.    Redness, swelling or pain around a vein in your leg.    Problems breastfeeding or a red or painful area on your breast.    Chest pain and cough or are gasping for air.    Problems coping with sadness, anxiety, or depression.  If you have any concerns about hurting yourself or the baby, call your provider immediately.     You have questions or concerns after you return home.     Keep your hands clean:  Always wash your  "hands before touching your perineal area and stitches.  This helps reduce your risk of infection.  If your hands aren't dirty, you may use an alcohol hand-rub to clean your hands. Keep your nails clean and short.        Pending Results     Date and Time Order Name Status Description    2017 0445 Placenta path order and indications In process             Statement of Approval     Ordered          17 1011  I have reviewed and agree with all the recommendations and orders detailed in this document.  EFFECTIVE NOW     Approved and electronically signed by:  Deepti Meyers MD             Admission Information     Date & Time Provider Department Dept. Phone    2017 Deepti Meyers MD [facility] [phone]      Your Vitals Were     Blood Pressure Pulse Temperature Respirations Height Last Period    144/97 74 97.8  F (36.6  C) (Oral) 16 1.651 m (5' 5\") 2016    Pulse Oximetry                   99%           MyChart Information     opendorse lets you send messages to your doctor, view your test results, renew your prescriptions, schedule appointments and more. To sign up, go to [email or web address] . Click on \"Log in\" on the left side of the screen, which will take you to the Welcome page. Then click on \"Sign up Now\" on the right side of the page.     You will be asked to enter the access code listed below, as well as some personal information. Please follow the directions to create your username and password.     Your access code is: [email or web address]  Expires: 3/27/2017  9:00 AM     Your access code will  in 90 days. If you need help or a new code, please call your [facility] clinic or [phone].        Care EveryWhere ID     This is your Care EveryWhere ID. This could be used by other organizations to access your [facility] medical records  [id number]           Review of your medicines      START taking        Dose / Directions    Cholecalciferol 5000 UNITS Tabs   Used for:  Anxiety        " Dose:  5000 Units   Take 5,000 Units by mouth daily   Quantity:  60 tablet   Refills:  0       ibuprofen 600 MG tablet   Commonly known as:  ADVIL/MOTRIN   Used for:   (normal spontaneous vaginal delivery)        Dose:  600 mg   Take 1 tablet (600 mg) by mouth every 6 hours as needed for moderate pain   Quantity:  40 tablet   Refills:  0       norethindrone 0.35 MG per tablet   Commonly known as:  MICRONOR   Used for:  Encounter for other contraceptive management        Dose:  1 tablet   Take 1 tablet (0.35 mg) by mouth daily   Quantity:  90 tablet   Refills:  1       QUEtiapine 25 MG tablet   Commonly known as:  SEROquel   Used for:  Anxiety        Dose:  25 mg   Take 1 tablet (25 mg) by mouth 2 times daily as needed   Quantity:  60 tablet   Refills:  0       senna-docusate 8.6-50 MG per tablet   Commonly known as:  SENOKOT-S;PERICOLACE   Used for:   (normal spontaneous vaginal delivery)        Dose:  1-2 tablet   Take 1-2 tablets by mouth 2 times daily   Quantity:  60 tablet   Refills:  0         CONTINUE these medicines which may have CHANGED, or have new prescriptions. If we are uncertain of the size of tablets/capsules you have at home, strength may be listed as something that might have changed.        Dose / Directions    * GABAPENTIN PO   Indication:  Nerve Pain, anxiety   This may have changed:  Another medication with the same name was added. Make sure you understand how and when to take each.        Dose:  600 mg   Take 600 mg by mouth 4 times daily   Refills:  0       * gabapentin 300 MG capsule   Commonly known as:  NEURONTIN   Indication:  Nerve Pain, anxiety   This may have changed:  You were already taking a medication with the same name, and this prescription was added. Make sure you understand how and when to take each.   Used for:  Generalized anxiety disorder        Dose:  600 mg   Take 2 capsules (600 mg) by mouth 4 times daily   Quantity:  120 capsule   Refills:  0       * VIIBRYD PO    This may have changed:  Another medication with the same name was added. Make sure you understand how and when to take each.        Dose:  40 mg   Take 40 mg by mouth daily   Refills:  0       * vilazodone 20 MG Tabs tablet   Commonly known as:  VIIBRYD   This may have changed:  You were already taking a medication with the same name, and this prescription was added. Make sure you understand how and when to take each.   Used for:  Generalized anxiety disorder        Dose:  60 mg   Take 3 tablets (60 mg) by mouth daily   Quantity:  90 tablet   Refills:  0       * Notice:  This list has 4 medication(s) that are the same as other medications prescribed for you. Read the directions carefully, and ask your doctor or other care provider to review them with you.      CONTINUE these medicines which have NOT CHANGED        Dose / Directions    ALBUTEROL IN        Refills:  0       aspirin 81 MG tablet   Used for:  Hx of preeclampsia, prior pregnancy, currently pregnant, second trimester        Dose:  81 mg   Take 1 tablet (81 mg) by mouth daily   Quantity:  90 tablet   Refills:  2       breast pump Misc   Used for:  Lactation disorder        Dose:  1 each   1 each daily as needed   Quantity:  1 each   Refills:  0       doxylamine 25 MG Tabs tablet   Commonly known as:  UNISOM   Used for:  Supervision of high risk pregnancy in second trimester        Dose:  25 mg   Take 1 tablet (25 mg) by mouth At Bedtime   Quantity:  30 each   Refills:  1       METHADONE HCL PO        Dose:  70 mg   Take 70 mg by mouth daily   Refills:  0       nicotine 21 MG/24HR 24 hr patch   Commonly known as:  NICODERM CQ   Used for:  HRP (high risk pregnancy), second trimester        Dose:  1 patch   Place 1 patch onto the skin every 24 hours   Quantity:  30 patch   Refills:  1       order for DME   Used for:  Bilateral sciatica        Maternity Belt order   Quantity:  1 each   Refills:  0       prenatal multivitamin  plus iron 27-0.8 MG Tabs per  tablet        Dose:  1 tablet   Take 1 tablet by mouth daily   Refills:  0       zolpidem 5 MG tablet   Commonly known as:  AMBIEN   Used for:  Insomnia        Dose:  5 mg   Take 1 tablet (5 mg) by mouth nightly as needed for sleep   Quantity:  30 tablet   Refills:  1         STOP taking     ABILIFY PO                Where to get your medicines      These medications were sent to [facility] [place], [place] - [address]  [address] [facility] [address], [facility] [address]     Phone:  [phone]     Cholecalciferol 5000 UNITS Tabs    gabapentin 300 MG capsule    ibuprofen 600 MG tablet    norethindrone 0.35 MG per tablet    QUEtiapine 25 MG tablet    senna-docusate 8.6-50 MG per tablet    vilazodone 20 MG Tabs tablet                Protect others around you: Learn how to safely use, store and throw away your medicines at www.disposemymeds.org.             Medication List: This is a list of all your medications and when to take them. Check marks below indicate your daily home schedule. Keep this list as a reference.      Medications           Morning Afternoon Evening Bedtime As Needed    ALBUTEROL IN                                aspirin 81 MG tablet   Take 1 tablet (81 mg) by mouth daily                                breast pump Misc   1 each daily as needed                                Cholecalciferol 5000 UNITS Tabs   Take 5,000 Units by mouth daily   Last time this was given:  5,000 Units on 2/21/2017  7:59 AM                                doxylamine 25 MG Tabs tablet   Commonly known as:  UNISOM   Take 1 tablet (25 mg) by mouth At Bedtime                                * GABAPENTIN PO   Take 600 mg by mouth 4 times daily   Last time this was given:  600 mg on 2/21/2017  8:10 AM                                * gabapentin 300 MG capsule   Commonly known as:  NEURONTIN   Take 2 capsules (600 mg) by mouth 4 times daily   Last time this was given:  600 mg on 2/21/2017  8:10 AM                                 ibuprofen 600 MG tablet   Commonly known as:  ADVIL/MOTRIN   Take 1 tablet (600 mg) by mouth every 6 hours as needed for moderate pain   Last time this was given:  800 mg on 2/21/2017  2:28 AM                                METHADONE HCL PO   Take 70 mg by mouth daily   Last time this was given:  58 mg on 2/21/2017  7:59 AM                                nicotine 21 MG/24HR 24 hr patch   Commonly known as:  NICODERM CQ   Place 1 patch onto the skin every 24 hours   Last time this was given:  1 patch on 2/21/2017 12:29 AM                                norethindrone 0.35 MG per tablet   Commonly known as:  MICRONOR   Take 1 tablet (0.35 mg) by mouth daily                                order for DME   Maternity Belt order                                prenatal multivitamin  plus iron 27-0.8 MG Tabs per tablet   Take 1 tablet by mouth daily   Last time this was given:  1 tablet on 2/21/2017  7:58 AM                                QUEtiapine 25 MG tablet   Commonly known as:  SEROquel   Take 1 tablet (25 mg) by mouth 2 times daily as needed   Last time this was given:  25 mg on 2/21/2017  4:57 AM                                senna-docusate 8.6-50 MG per tablet   Commonly known as:  SENOKOT-S;PERICOLACE   Take 1-2 tablets by mouth 2 times daily   Last time this was given:  1 tablet on 2/21/2017  7:58 AM                                * VIIBRYD PO   Take 40 mg by mouth daily   Last time this was given:  40 mg on 2/21/2017  7:58 AM                                * vilazodone 20 MG Tabs tablet   Commonly known as:  VIIBRYD   Take 3 tablets (60 mg) by mouth daily   Last time this was given:  40 mg on 2/21/2017  7:58 AM                                zolpidem 5 MG tablet   Commonly known as:  AMBIEN   Take 1 tablet (5 mg) by mouth nightly as needed for sleep                                * Notice:  This list has 4 medication(s) that are the same as other medications prescribed for you. Read the directions  carefully, and ask your doctor or other care provider to review them with you.

## 2017-02-19 LAB
ABO + RH BLD: NORMAL
ABO + RH BLD: NORMAL
AMPHETAMINES UR QL SCN: NORMAL
BLD GP AB SCN SERPL QL: NORMAL
BLOOD BANK CMNT PATIENT-IMP: NORMAL
CANNABINOIDS UR QL: NORMAL
COCAINE UR QL: NORMAL
OPIATES UR QL SCN: NORMAL
PCP UR QL SCN: NORMAL
PROT UR-MCNC: 0.1 G/L
PROT/CREAT 24H UR: 0.18 G/G CR (ref 0–0.2)
SPECIMEN EXP DATE BLD: NORMAL
T PALLIDUM IGG+IGM SER QL: NEGATIVE

## 2017-02-19 PROCEDURE — 10907ZC DRAINAGE OF AMNIOTIC FLUID, THERAPEUTIC FROM PRODUCTS OF CONCEPTION, VIA NATURAL OR ARTIFICIAL OPENING: ICD-10-PCS | Performed by: OBSTETRICS & GYNECOLOGY

## 2017-02-19 PROCEDURE — 72200001 ZZH LABOR CARE VAGINAL DELIVERY SINGLE

## 2017-02-19 PROCEDURE — 25000132 ZZH RX MED GY IP 250 OP 250 PS 637: Performed by: OBSTETRICS & GYNECOLOGY

## 2017-02-19 PROCEDURE — 0HQ9XZZ REPAIR PERINEUM SKIN, EXTERNAL APPROACH: ICD-10-PCS | Performed by: OBSTETRICS & GYNECOLOGY

## 2017-02-19 PROCEDURE — 88307 TISSUE EXAM BY PATHOLOGIST: CPT | Performed by: OBSTETRICS & GYNECOLOGY

## 2017-02-19 PROCEDURE — 88307 TISSUE EXAM BY PATHOLOGIST: CPT | Mod: 26 | Performed by: OBSTETRICS & GYNECOLOGY

## 2017-02-19 PROCEDURE — 25000125 ZZHC RX 250: Performed by: OBSTETRICS & GYNECOLOGY

## 2017-02-19 PROCEDURE — 12000028 ZZH R&B OB UMMC

## 2017-02-19 RX ORDER — OXYTOCIN/0.9 % SODIUM CHLORIDE 30/500 ML
100 PLASTIC BAG, INJECTION (ML) INTRAVENOUS CONTINUOUS
Status: DISCONTINUED | OUTPATIENT
Start: 2017-02-19 | End: 2017-02-21 | Stop reason: HOSPADM

## 2017-02-19 RX ORDER — OXYTOCIN/0.9 % SODIUM CHLORIDE 30/500 ML
340 PLASTIC BAG, INJECTION (ML) INTRAVENOUS CONTINUOUS PRN
Status: DISCONTINUED | OUTPATIENT
Start: 2017-02-19 | End: 2017-02-21 | Stop reason: HOSPADM

## 2017-02-19 RX ORDER — HYDROCORTISONE 2.5 %
CREAM (GRAM) TOPICAL 3 TIMES DAILY PRN
Status: DISCONTINUED | OUTPATIENT
Start: 2017-02-19 | End: 2017-02-21 | Stop reason: HOSPADM

## 2017-02-19 RX ORDER — AMOXICILLIN 250 MG
1-2 CAPSULE ORAL 2 TIMES DAILY
Status: DISCONTINUED | OUTPATIENT
Start: 2017-02-19 | End: 2017-02-21 | Stop reason: HOSPADM

## 2017-02-19 RX ORDER — PRENATAL VIT/IRON FUM/FOLIC AC 27MG-0.8MG
1 TABLET ORAL DAILY
Status: DISCONTINUED | OUTPATIENT
Start: 2017-02-19 | End: 2017-02-21 | Stop reason: HOSPADM

## 2017-02-19 RX ORDER — ZOLPIDEM TARTRATE 5 MG/1
5 TABLET ORAL
Status: DISCONTINUED | OUTPATIENT
Start: 2017-02-19 | End: 2017-02-21 | Stop reason: HOSPADM

## 2017-02-19 RX ORDER — NALOXONE HYDROCHLORIDE 0.4 MG/ML
.1-.4 INJECTION, SOLUTION INTRAMUSCULAR; INTRAVENOUS; SUBCUTANEOUS
Status: DISCONTINUED | OUTPATIENT
Start: 2017-02-19 | End: 2017-02-21 | Stop reason: HOSPADM

## 2017-02-19 RX ORDER — MISOPROSTOL 200 UG/1
400 TABLET ORAL
Status: DISCONTINUED | OUTPATIENT
Start: 2017-02-19 | End: 2017-02-21 | Stop reason: HOSPADM

## 2017-02-19 RX ORDER — OXYTOCIN 10 [USP'U]/ML
10 INJECTION, SOLUTION INTRAMUSCULAR; INTRAVENOUS
Status: DISCONTINUED | OUTPATIENT
Start: 2017-02-19 | End: 2017-02-21 | Stop reason: HOSPADM

## 2017-02-19 RX ORDER — ACETAMINOPHEN 325 MG/1
650 TABLET ORAL EVERY 4 HOURS PRN
Status: DISCONTINUED | OUTPATIENT
Start: 2017-02-19 | End: 2017-02-21 | Stop reason: HOSPADM

## 2017-02-19 RX ORDER — NICOTINE 21 MG/24HR
1 PATCH, TRANSDERMAL 24 HOURS TRANSDERMAL DAILY
Status: DISCONTINUED | OUTPATIENT
Start: 2017-02-19 | End: 2017-02-21 | Stop reason: HOSPADM

## 2017-02-19 RX ORDER — IBUPROFEN 400 MG/1
400-800 TABLET, FILM COATED ORAL EVERY 6 HOURS PRN
Status: DISCONTINUED | OUTPATIENT
Start: 2017-02-19 | End: 2017-02-21 | Stop reason: HOSPADM

## 2017-02-19 RX ORDER — GABAPENTIN 300 MG/1
600 CAPSULE ORAL 4 TIMES DAILY
Status: DISCONTINUED | OUTPATIENT
Start: 2017-02-19 | End: 2017-02-21 | Stop reason: HOSPADM

## 2017-02-19 RX ORDER — NICOTINE 21 MG/24HR
1 PATCH, TRANSDERMAL 24 HOURS TRANSDERMAL EVERY 24 HOURS
Status: DISCONTINUED | OUTPATIENT
Start: 2017-02-19 | End: 2017-02-19

## 2017-02-19 RX ORDER — LANOLIN 100 %
OINTMENT (GRAM) TOPICAL
Status: DISCONTINUED | OUTPATIENT
Start: 2017-02-19 | End: 2017-02-21 | Stop reason: HOSPADM

## 2017-02-19 RX ORDER — VILAZODONE HYDROCHLORIDE 40 MG/1
40 TABLET ORAL DAILY
Status: DISCONTINUED | OUTPATIENT
Start: 2017-02-19 | End: 2017-02-21 | Stop reason: HOSPADM

## 2017-02-19 RX ORDER — BISACODYL 10 MG
10 SUPPOSITORY, RECTAL RECTAL DAILY PRN
Status: DISCONTINUED | OUTPATIENT
Start: 2017-02-21 | End: 2017-02-21 | Stop reason: HOSPADM

## 2017-02-19 RX ADMIN — VILAZODONE HYDROCHLORIDE 40 MG: 40 TABLET ORAL at 08:44

## 2017-02-19 RX ADMIN — ACETAMINOPHEN 650 MG: 325 TABLET, FILM COATED ORAL at 08:43

## 2017-02-19 RX ADMIN — SENNOSIDES AND DOCUSATE SODIUM 2 TABLET: 8.6; 5 TABLET ORAL at 08:43

## 2017-02-19 RX ADMIN — ACETAMINOPHEN 650 MG: 325 TABLET, FILM COATED ORAL at 16:05

## 2017-02-19 RX ADMIN — GABAPENTIN 600 MG: 300 CAPSULE ORAL at 17:35

## 2017-02-19 RX ADMIN — METHADONE HYDROCHLORIDE 57 MG: 10 CONCENTRATE ORAL at 17:37

## 2017-02-19 RX ADMIN — Medication 58 MG: at 11:19

## 2017-02-19 RX ADMIN — SENNOSIDES AND DOCUSATE SODIUM 1 TABLET: 8.6; 5 TABLET ORAL at 21:30

## 2017-02-19 RX ADMIN — IBUPROFEN 800 MG: 800 TABLET, FILM COATED ORAL at 03:00

## 2017-02-19 RX ADMIN — PRENATAL VIT W/ FE FUMARATE-FA TAB 27-0.8 MG 1 TABLET: 27-0.8 TAB at 08:43

## 2017-02-19 RX ADMIN — GABAPENTIN 600 MG: 300 CAPSULE ORAL at 21:30

## 2017-02-19 RX ADMIN — IBUPROFEN 800 MG: 400 TABLET ORAL at 21:30

## 2017-02-19 RX ADMIN — NICOTINE 1 PATCH: 21 PATCH, EXTENDED RELEASE TRANSDERMAL at 08:46

## 2017-02-19 RX ADMIN — OXYTOCIN-SODIUM CHLORIDE 0.9% IV SOLN 30 UNIT/500ML 340 ML/HR: 30-0.9/5 SOLUTION at 02:05

## 2017-02-19 RX ADMIN — GABAPENTIN 600 MG: 300 CAPSULE ORAL at 08:44

## 2017-02-19 RX ADMIN — ACETAMINOPHEN 650 MG: 325 TABLET, FILM COATED ORAL at 21:29

## 2017-02-19 RX ADMIN — IBUPROFEN 800 MG: 400 TABLET ORAL at 13:20

## 2017-02-19 NOTE — L&D DELIVERY NOTE
Delivery Summary    Ileana Thorne MRN# [id number]   Age: 21 year old YOB: 1995     Ileana Thorne is a 21 year old now  who presented at 37w1d for evaluation of back pain, loss of mucous plug and cramping. She was noted to be 4 cm dilated and darlin irregularly and relatively comfortably. On recheck she was noted to be 6 cm dilated and she was admitted for management of labor. She progressed spontaneously to 8 cm and received an epidural for pain control. AROM to meconium stained fluid was then undertaken at 0032. She was noted to be 10/100/-1 station. She attempted to push without significant descent. She then labored down and had recurrent late decelerations which began at 0147 with moderate variability at baseline in between. She then developed fetal bradycardia at 0157. This did not resolve with intrauterine rescucitation and cervical exam was significant for 10/100/+3, she pushed and delivered a single viable male infant in OA position and cephalic presentation. The infant was delivered at 0202 on 2017 with apgars of 4/7/9 weighing 3010 grams with  Arterial gas pH of 7.15, base excess of 4.2. Venous gas pH 7.22 base excess 1.5. Placenta followed with controlled cord traction. She sustained a first degree laceration that was repaired using a running locked stitch of 0 vicryl. .     Dr. Meyers was present for the entire delivery and repair.    Mandy Marte MD 2017 10:50 AM         Labor Event Times    Labor onset date:  17 Onset time:   9:30 PM   Dilation complete date:  17 Complete time:  12:37 AM   Start pushing date/time:  2017 0200            Labor Length    1st Stage (hrs):  3 (min):  7   2nd Stage (hrs):  1 (min):  25   3rd Stage (hrs):  0 (min):  15      Labor Events     labor?:  No    steroids:  None   Labor Type:  Spontaneous, Augmentation   Predominate monitoring during 1st stage:  continuous electronic fetal monitoring       Antibiotics received during labor?:  No      Rupture identifier:  Rupture 1   Rupture date/time: 17   Rupture type:  Artificial Rupture of Membranes   Fluid color:  Meconium   Fluid odor:  Normal      Augmentation:  AROM   Augmentation date/time:  17   Indications for augmentation:  Ineffective Contraction Pattern   1:1 continuous labor support provided by?:  none Labor partogram used?:  no         Delivery/Placenta Date and Time    Delivery Date:  17 Delivery Time:   2:02 AM   Placenta Date/Time:  2017  2:17 AM   Oxytocin given at the time of delivery:  after delivery of baby      Vaginal Counts    Initial count performed by 2 team members:   Two Team Members   MD LORETO Meyers, RN          Needles Suture [place] Sponges Instruments   Initial counts 2 0 5    Added to count 0 1 0    Final counts 2 1 5       Placed during labor Accounted for at the end of labor   NA NA   NA NA   NA NA      Final count performed by 2 team members:   Two Team Members   MD LORETO Meyers, RN         Final count correct?:  Yes         Apgars    Living status:  Yes    1 Minute 5 Minute 10 Minute 15 Minute 20 Minute   Skin color: 0  1  1      Heart rate: 1  2  2      Reflex irritability: 1  1  2      Muscle tone: 1  1  2      Respiratory effort: 1  2  2      Total: 4  7  9         Apgars assigned by:  LACY MERCADO CNP      Cord    Vessels:  Unknown Complications:  None   Cord Blood Disposition:  Lab Gases Sent?:  Yes          Resuscitation    Methods:  Oximetry, Temp Skin Control, Oxygen, NCPAP       Care at Delivery:  NICU team called to attend the delivery of a 37+1 week infant due to decels and meconium. Arrived into delivery room at approximately 1 minute of age, infant blue with some tone and minimal respiratory effort. Brought to warmer, dried and stimulated resulting in crying and mild improvement in tone. CPAP +5 21% via neopuff initiated, saturation probe applied to  right wrist and infant noted to have good respiratory effort, good EEP sounds, improvement in color and good heart rate but still slightly hypotonic. At approximately 5 minutes of age saturations noted to be in the 70's so FiO2 incrementally increased to 50% to achieve saturations 93% around 10 minutes of life. Attempts made to elicit cough and cry, CPAP increased to +6 and was continued with several unsuccessful attempts over a period of 20 minutes to wean FiO2 down below 40%. Mom updated of infant's need for NICU admission and infant was transferred around 30 minutes of age for further management of respiratory failure. LACY Boyer, CNP-BC 2/19/2017 3:14 AM     Output in Delivery Room:  Voided, Stool      Skin to Skin and Feeding Plan    Skin to skin initiation date/time: 2/19/17 0202   Skin to skin with:  Mother   Skin to skin end date/time: 2/19/17 0204      How do you plan to feed your baby:  Breastfeeding      Labor Events and Shoulder Dystocia    Fetal Tracing Prior to Delivery:  Category 1, Category 2   Fetal Tracing Comments:  she developed bradycardia at 0157 following recurrent late decelerations which began at 0147 with moderate variability at baseline in between   Shoulder dystocia present?:  Neg            Delivery (Maternal) (Provider to Complete) (318469)    Episiotomy:  None   Perineal lacerations:  1st Repaired?:  Yes   Vaginal laceration?:  No    Cervical laceration?:  No    Vaginal delivery est. blood loss (mL):  300         Mother's Information  Mother: Ileana Thorne #[id number]    Start of Mother's Information     IO Blood Loss  02/18/17 2130 - 02/19/17 1022    Mom's I/O Activity            End of Mother's Information  Mother: Fadumo Ileana #[id number]            Delivery - Provider to Complete (336030)    Delivering clinician:  [facility]SE   Attempted Delivery Types (Choose all that apply):  Spontaneous Vaginal Delivery   Delivery Type (Choose the 1 that will go to the Birth  History):  Vaginal, Spontaneous Delivery                     Other personnel:   Provider Role   MANDY MARTE Resident            Placenta    Immediate Cord Clamping:  Done   Date/Time:  2/19/2017  2:17 AM   Removal:  Spontaneous   Disposition:  Pathology      Anesthesia    Method:  Epidural   Cervical dilation at placement:  8-10         Presentation and Position    Presentation:  Vertex   Position:  Middle Occiput Anterior                    Mandy Marte MD     Staff MD Note  I was present and scrubbed for the entire procedure noted above.  I agree with the description above and any necessary changes have been made by me.  Deepti Meyers MD

## 2017-02-19 NOTE — ANESTHESIA PREPROCEDURE EVALUATION
Anesthesia Evaluation     .        ROS/MED HX    ENT/Pulmonary:     (+)tobacco use, Current use 0.5 packs/day  asthma , . .    Neurologic:       Cardiovascular:  - neg cardiovascular ROS       METS/Exercise Tolerance:  4 - Raking leaves, gardening   Hematologic:  - neg hematologic  ROS       Musculoskeletal:   (+) arthritis, , , -       GI/Hepatic:  - neg GI/hepatic ROS       Renal/Genitourinary:  - ROS Renal section negative       Endo:         Psychiatric:     (+) psychiatric history anxiety and depression      Infectious Disease:  - neg infectious disease ROS       Malignancy:         Other: Comment: On methadone    (+) H/O Chronic Pain,H/O chronic opiod use ,                ANESTHESIA PREOP EVALUATION    HPI: Ileana Thorne is a 21 year old female who presents for * No procedures listed *      No personal or family h/o anesthesia problems.    PMHx/PSHx/ROS:  Past Medical History   Diagnosis Date     Anxiety      Arthritis      Depressive disorder      Essential hypertension 2/25/2015     Substance abuse 2/13/2014     Uncomplicated asthma        Past Surgical History   Procedure Laterality Date     Appendectomy       open     Tonsillectomy & adenoidectomy           Soc Hx:   Social History   Substance Use Topics     Smoking status: Current Every Day Smoker     Packs/day: 0.50     Years: 10.00     Smokeless tobacco: Not on file     Alcohol use No       Allergies:   Allergies   Allergen Reactions     Seasonal Allergies Other (See Comments)       Meds:     No current facility-administered medications on file prior to encounter.   Current Outpatient Prescriptions on File Prior to Encounter:  aspirin 81 MG tablet Take 1 tablet (81 mg) by mouth daily   doxylamine (UNISOM) 25 MG TABS Take 1 tablet (25 mg) by mouth At Bedtime   METHADONE HCL PO Take 70 mg by mouth daily    GABAPENTIN PO Take 600 mg by mouth 4 times daily    Vilazodone HCl (VIIBRYD PO) Take 40 mg by mouth daily    ARIPiprazole (ABILIFY PO) Take 5 mg by  mouth daily    ALBUTEROL IN    Prenatal Vit-Fe Fumarate-FA (PRENATAL MULTIVITAMIN  PLUS IRON) 27-0.8 MG TABS Take 1 tablet by mouth daily   Misc. Devices (BREAST PUMP) MISC 1 each daily as needed   nicotine (NICODERM CQ) 21 MG/24HR 24 hr patch Place 1 patch onto the skin every 24 hours   order for DME Maternity Belt order   zolpidem (AMBIEN) 5 MG tablet Take 1 tablet (5 mg) by mouth nightly as needed for sleep       NPO Status: see flowsheet     Labs:    Blood Bank:  Lab Results   Component Value Date    ABO A 08/26/2016    RH Pos 08/26/2016       CBC:   Recent Labs   Lab Test  01/10/17   1356   WBC  11.4*   RBC  3.79*   HGB  12.1   HCT  35.9   MCV  95   MCH  31.9   MCHC  33.7   RDW  12.6   PLT  151       Physical Exam  Normal systems: pulmonary and dental    Airway   Mallampati: II  TM distance: >3 FB  Neck ROM: full    Dental     Cardiovascular   Rhythm and rate: regular and normal      Pulmonary                     Anesthesia Plan      History & Physical Review  History and physical reviewed and following examination; no interval change.    ASA Status:  2 .        Plan for CSE          Postoperative Care  Postoperative pain management:  Neuraxial analgesia.      Consents  Anesthetic plan, risks, benefits and alternatives discussed with:  Patient..            -- CSE     Cesar Ramesh MD  Staff Anesthesiologist

## 2017-02-19 NOTE — PLAN OF CARE
Pt states is very uncomfortable; having pain on lower abdomen. Pt slept during monitoring and woke up with pain and crying. No contractions on monitor noted, abdomen soft. Baby  moderate variability with accels no decels. Tylenol 975ml PO given. Providers in OR; pt waiting for reassessment.

## 2017-02-19 NOTE — PLAN OF CARE
Problem: Goal Outcome Summary  Goal: Goal Outcome Summary  Outcome: No Change  Transferred to [facility], oriented to room. BP slightly elevated, all other vital signs and assessments WDL. Verbalizes pain is well-controlled with hot packs, medication offered but refused. Voiding, no BM or gas since delivery. Ambulating independently, instructed to call for assistance. Pitocin finished, IV removed due to patient desire to go outside and smoke (drug history). Provider was notified inquiring about IV access status, medications, and EDS. Patient brought liquid methadone from home (one container 57mgs, another container 58mgs), retrieved from patient. Methadone placed in envelope, ouble checked and signed with oncoming nurse Bety Baires, also signed by security and taken by security for storage. FS planning to give patient receipt due to change of shift. Provider aware of dose and planning to contact patient's clinic to establish dosing for MAR. Patient also requested Klonapin for anxiety, provider made aware. Potential med interaction (ibuprofen and VIIBRYD), provider notified. EDS of 18, provider notified, social work consult in place. She verbalizes concern with increased anxiety as she plans on quitting smoking today. Visited NICU after going outside, planning to pump upon return, encouraged to breast massage. Educated on signs of bleeding to report. Has some nipple tenderness. Continue to monitor.

## 2017-02-19 NOTE — PLAN OF CARE
Labor Pain Intervention  DATA:  Patient requested epidural for pain relief.   INTERVENTIONS:   Epidural administered per MDA at 2313.    ASSESSMENT:   Tolerated well.  Epidural covering labor pains. Using PCA PRN. Pt resting comfortably while laboring down.   PLAN:  Continue laboring down, intrapartum cares, and assessments.  Anticipate .

## 2017-02-19 NOTE — PLAN OF CARE
Problem: Labor (Cervical Ripen, Induct, Augment) (Adult,Obstetrics,Pediatric)  Goal: Signs and Symptoms of Listed Potential Problems Will be Absent or Manageable (Labor)  Signs and symptoms of listed potential problems will be absent or manageable by discharge/transition of care (reference Labor (Cervical Ripen, Induct, Augment) (Adult,Obstetrics,Pediatric) CPG).  Outcome: Completed Date Met:  17  Vaginal Delivery Note  Decel noted on central monitor at 0157, RN to room, called for second RN and Dr. Marte. Pt repositioned from high fowlers to right lateral to left lateral. O2 applied. Pushing efforts resulted in  of viable Male at 0202 with Dr. Marte, Dr. Meyers in attendance.  Second RN Renetta DANGELO present at delivery, NICU arrived at ~1 min. of life.  Infant to mother's abdomen, dried and stimulated, then taken to warmer for further assessment APGAR at 1 minute:  4. APGAR at 5 minutes: 7. Placenta delivered with out complication, IV pitocin 30 units started after delivery of infant, first degree laceration repaired, ari cares provided. Baby taken to NICU. Mother in stable condition.

## 2017-02-19 NOTE — PLAN OF CARE
Problem: Goal Outcome Summary  Goal: Goal Outcome Summary  Outcome: Improving  Blood pressures elevated. Not requiring interventions. Pt denies any symptoms of SOB, HA, Epigastric pain. Able to empty bladder without any concerns. Passing gas. No BM. Pain well managed with motrin and tylenol. Pumping for infant down in NICU. Pt on methadone protocol. Given morning dosage. continue with plan of care.

## 2017-02-19 NOTE — H&P
[facility]  OB H&P  Patient name: Ileana Thorne  MRN: [id number]  : 1995    CC: REBECCA     HPI: Ms. Ileana Thorne is a 21 year old  at 37w0d by 12w4d US, who presents with cramping and vaginal fluid leakage. She reports menstrual-like cramping that started around 1300 today as well as a small amount of clear mucous and bloody vaginal discharge. She also reports contractions which she says started when she got here around 1600. She reports sharp vaginal pain which also started around 1600. She vomited twice this morning. She denies chest pain, shortness of breath, headache, vision changes, dysuria and urinary frequency. Has not had sex in the last few days. She is feeling the baby move but says it is less than usual. She reports she last used drugs in 2016, currently she is taking methadone. She is smoking half a pack of cigarettes a day.     Obstetric Complications  1. Hx of pre-eclampsia with  delivery   2. CHTN, no medication, neg HELLP labs in 10/2016. Creatinine, WNL  3. Polysubstance abuse in 2nd trimester- plan UDS on admission  4. Marginal cord insertion  5. Cigarette smoking in pregnancy- 1 ppD  6. Anxiety and depression - abilify and vilazodone  7.   Passed GCT (112). Hgb wnl, vit D wnl, RPR neg. S/p TDaP. GBS at next visit.  Past Medical History   Diagnosis Date     Anxiety      Arthritis      Depressive disorder      Essential hypertension 2015     Substance abuse 2014     Uncomplicated asthma      Past Surgical History   Procedure Laterality Date     Appendectomy       open     Tonsillectomy & adenoidectomy          Allergies   Allergen Reactions     Seasonal Allergies Other (See Comments)     Current Facility-Administered Medications   Medication     gabapentin (NEURONTIN) capsule 600 mg     fentaNYL Citrate (PF) (SUBLIMAZE) 100 MCG/2ML injection     lactated ringers infusion     lactated ringers BOLUS 500 mL     acetaminophen (TYLENOL)  "tablet 650 mg     ondansetron (ZOFRAN) injection 4 mg     oxytocin (PITOCIN) injection 10 Units     oxytocin (PITOCIN) 30 units in 500 mL 0.9% NaCl infusion     Medication Instructions: misoprostol (CYTOTEC)- Nurse to discuss ordering with provider, if needed. Ordered via \"OB misoprostol (CYTOTEC) Postpartum Hemorrhage PANEL\"     methylergonovine (METHERGINE) injection 200 mcg     carboprost (HEMABATE) injection 250 mcg     lidocaine 1 % 0.1-20 mL     ibuprofen (ADVIL/MOTRIN) tablet 800 mg     oxyCODONE-acetaminophen (PERCOCET) 5-325 MG per tablet 1 tablet     lidocaine 1 % 1 mL     lidocaine (LMX4) kit     sodium chloride (PF) 0.9% PF flush 3 mL     sodium chloride (PF) 0.9% PF flush 3 mL     fentaNYL Citrate (PF) (SUBLIMAZE) injection 100 mcg     medication instruction     Opioid plan postpartum - medication instruction     fentaNYL (SUBLIMAZE) 2 mcg/mL, bupivacaine (MARCAINE) 0.125% in NS premix for PCEA     lactated ringers BOLUS 250 mL     ePHEDrine injection 5 mg     nalbuphine (NUBAIN) injection 2.5-5 mg     naloxone (NARCAN) injection 0.1-0.4 mg     medication instruction     lactated ringers BOLUS 500 mL     Facility-Administered Medications Ordered in Other Encounters   Medication     lidocaine-EPINEPHrine 1.5 %-[id number] injection     bupivacaine HCl 0.25 % injection SOLN       Objective:  Patient Vitals for the past 24 hrs:   BP Temp Temp src Heart Rate Height   02/18/17 1704 - - - - 1.651 m (5' 5\")   02/18/17 1644 124/66 98.5  F (36.9  C) Oral 18 -     Gen: Well-nourished, NAD, appears uncomfortable   Pulm: Breathing comfortably on room air.   Abd: Soft, gravid.   Ext: +1 pitting LE edema b/l    Spec: Normal external genitalia, nl appearing vaginal vault without lesions or erythema. No blood visible in vagina. Physiologic discharge. No lesions or bleeding from cervix.   Cervix: 4/80/-2 > 8/100/0    FHT: , moderate jerry, accels present, no decels  Monson: Intermittent ctx seen on monitor, inconsistent " with pt reporting    Labs:  Results for orders placed or performed during the hospital encounter of 17   UA with Microscopic reflex to Culture   Result Value Ref Range    Color Urine Yellow     Appearance Urine Clear     Glucose Urine Negative NEG mg/dL    Bilirubin Urine Negative NEG    Ketones Urine Negative NEG mg/dL    Specific Gravity Urine 1.017 1.003 - 1.035    Blood Urine Negative NEG    pH Urine 6.0 5.0 - 7.0 pH    Protein Albumin Urine Negative NEG mg/dL    Urobilinogen mg/dL Normal 0.0 - 2.0 mg/dL    Nitrite Urine Negative NEG    Leukocyte Esterase Urine Small (A) NEG    Source Unspecified Urine     WBC Urine 5 (H) 0 - 2 /HPF    RBC Urine 7 (H) 0 - 2 /HPF    Bacteria Urine Few (A) NEG /HPF    Squamous Epithelial /HPF Urine 3 (H) 0 - 1 /HPF    Mucous Urine Present (A) NEG /LPF   Drug Screen Urine /Morris    Result Value Ref Range    Amphetamine Qual Urine  NEG     Negative   Cutoff for a negative amphetamine is 500 ng/mL or less.      Cannabinoids Qual Urine  NEG     Negative   Cutoff for a negative cannabinoid is 50 ng/mL or less.      Cocaine Qual Urine  NEG     Negative   Cutoff for a negative cocaine is 300 ng/mL or less.      Opiates Qualitative Urine  NEG     Negative   Cutoff for a negative opiate is 300 ng/mL or less.      Pcp Qual Urine  NEG     Negative   Cutoff for a negative PCP is 25 ng/mL or less.     CBC with platelets   Result Value Ref Range    WBC 11.7 (H) 4.0 - 11.0 10e9/L    RBC Count 3.85 3.8 - 5.2 10e12/L    Hemoglobin 12.4 11.7 - 15.7 g/dL    Hematocrit 36.6 35.0 - 47.0 %    MCV 95 78 - 100 fl    MCH 32.2 26.5 - 33.0 pg    MCHC 33.9 31.5 - 36.5 g/dL    RDW 12.9 10.0 - 15.0 %    Platelet Count 141 (L) 150 - 450 10e9/L   AST   Result Value Ref Range    AST 16 0 - 45 U/L   ALT   Result Value Ref Range    ALT 24 0 - 50 U/L   Creatinine   Result Value Ref Range    Creatinine 0.64 0.52 - 1.04 mg/dL    GFR Estimate >90  Non  GFR Calc   >60 mL/min/1.7m2    GFR  Estimate If Black >90   GFR Calc   >60 mL/min/1.7m2   Protein random urine   Result Value Ref Range    Protein Random Urine 0.10 g/L    Protein Total Urine g/gr Creatinine 0.18 0 - 0.2 g/g Cr   ABO/Rh type and screen   Result Value Ref Range    ABO A     RH(D)  Pos     Antibody Screen Neg     Test Valid Only At       [facility]    Specimen Expires 2017        Assessment/Plan:  Ms. Ileana Thorne is a 21 year old  at 37w0d by 12w4d US who presented with cramping and minimal vaginal bleeding/mucous discharge found to have progressively uncomfortable contractions and significant cervical change.    Labor:  - Patient progressing spontaneously now also s/p AROM at 1240 AM  - desires epidural, now placed  - GBS unknown, term, no hx of GBS infected infants  - Marginal cord insertion    CHTN, no medication, neg HELLP labs in today   Hx of pre-eclampsia, will continue to monitor closely, repeat HELLP labswith elevated BPs    Polysubstance abuse in 2nd trimester-admit UDS negative  - Continue Methadone therapy 115 mg Methadone daily, will verify dose in AM    Cigarette smoking in pregnancy- 1 ppD    Anxiety and depression - abilify and vilazodone    Routine OB Care: Passed GCT (112). Hgb wnl, vit D wnl, RPR neg. S/p TDaP. GBS at next visit.    Anticipate   Note written jointly with Ashley Rodriguez, MS3.     Mandy Marte MD 2017 1:29 AM     Staff MD Note    I appreciate the note by Dr. Marte.  Any necessary changes have been made by me.  I evaluated the patient with the resident and agree with the assessment and plan.    Deepti Meyers MD

## 2017-02-19 NOTE — PHARMACY
Methadone Clinic Information Note    Clinic Name: [facility]   Clinic Location (city):[address], [place] [address]     Phone Number: [phone]    Verified methadone dose.  Has been receiving 58mg qAM and 57mg qPM    Pat Lewis, PharmD, MPH, BCPS

## 2017-02-19 NOTE — PLAN OF CARE
Problem: Postpartum, Vaginal Delivery (Adult)  Goal: Signs and Symptoms of Listed Potential Problems Will be Absent or Manageable (Postpartum, Vaginal Delivery)  Signs and symptoms of listed potential problems will be absent or manageable by discharge/transition of care (reference Postpartum, Vaginal Delivery (Adult) CPG).   Outcome: No Change  Data: Ileana Thorne transferred to [id number] via wheelchair at 0500 after visiting infant in NICU from ~4150-2143.   Action: Receiving unit notified of transfer: Yes. Patient and family notified of room change. Report given to ADELFO Goodwin at 0415 via phone. Belongings sent to receiving unit. Accompanied by Registered Nurse. Oriented patient to surroundings. Call light within reach.   Response: Patient tolerated transfer and is stable.

## 2017-02-19 NOTE — LACTATION NOTE
"D: I met with Ileana. Her medical record describes a history of arthritis, anxiety/depression, and substance abuse (last use heroin was 9/2016; she states she is in outpatient treatment program; tox screens negative today). She takes gabapentin (L2 [facility]), vilazadone (L3), Abilify (L3), methadone (L2), and nicoderm patch (L2). She and has no history of breast/chest surgery or trauma. She attempted to breastfeed her first child for 2 weeks but supplemented with formula. She has already started to pump.   I: I gave her a folder of introductory materials. I reviewed physiology of colostrum and milk production, pumping guidelines, and I gave her a log and encouraged her to use it. I explained how to access the videos \"Hand Expression\" and \"Maximizing Milk Production\"; as well as other helpful books and websites. We discussed hands-on pumping techniques and usefulness of a hands-free pumping bra. We discussed skin to skin holding and how to reach breastfeeding goals. We talked about birth control and other medications during breastfeeding. She verbalized understanding. She has pump coverage through her insurance company.   A: Mom has information she needs to initiate her supply.   P: Will continue to provide lactation support.  Aniya Ceja, RNC, IBCLC           "

## 2017-02-20 LAB
HGB BLD-MCNC: 11 G/DL (ref 11.7–15.7)
RUBV IGG SERPL IA-ACNC: 20 IU/ML

## 2017-02-20 PROCEDURE — 12000028 ZZH R&B OB UMMC

## 2017-02-20 PROCEDURE — 36415 COLL VENOUS BLD VENIPUNCTURE: CPT | Performed by: OBSTETRICS & GYNECOLOGY

## 2017-02-20 PROCEDURE — 25000132 ZZH RX MED GY IP 250 OP 250 PS 637: Performed by: OBSTETRICS & GYNECOLOGY

## 2017-02-20 PROCEDURE — 86762 RUBELLA ANTIBODY: CPT | Performed by: OBSTETRICS & GYNECOLOGY

## 2017-02-20 PROCEDURE — 85018 HEMOGLOBIN: CPT | Performed by: OBSTETRICS & GYNECOLOGY

## 2017-02-20 RX ORDER — IBUPROFEN 600 MG/1
600 TABLET, FILM COATED ORAL EVERY 6 HOURS PRN
Qty: 40 TABLET | Refills: 0 | Status: SHIPPED | OUTPATIENT
Start: 2017-02-20 | End: 2017-03-03

## 2017-02-20 RX ORDER — QUETIAPINE FUMARATE 25 MG/1
25 TABLET, FILM COATED ORAL 2 TIMES DAILY
Status: DISCONTINUED | OUTPATIENT
Start: 2017-02-20 | End: 2017-02-21 | Stop reason: HOSPADM

## 2017-02-20 RX ORDER — AMOXICILLIN 250 MG
1-2 CAPSULE ORAL 2 TIMES DAILY
Qty: 60 TABLET | Refills: 0 | Status: ON HOLD | OUTPATIENT
Start: 2017-02-20 | End: 2017-05-15

## 2017-02-20 RX ADMIN — GUAIFENESIN 10 ML: 100 SOLUTION ORAL at 09:15

## 2017-02-20 RX ADMIN — VILAZODONE HYDROCHLORIDE 40 MG: 40 TABLET ORAL at 09:15

## 2017-02-20 RX ADMIN — ACETAMINOPHEN 650 MG: 325 TABLET, FILM COATED ORAL at 07:00

## 2017-02-20 RX ADMIN — NICOTINE 1 PATCH: 21 PATCH, EXTENDED RELEASE TRANSDERMAL at 09:15

## 2017-02-20 RX ADMIN — IBUPROFEN 800 MG: 400 TABLET ORAL at 07:00

## 2017-02-20 RX ADMIN — ACETAMINOPHEN 650 MG: 325 TABLET, FILM COATED ORAL at 12:44

## 2017-02-20 RX ADMIN — PRENATAL VIT W/ FE FUMARATE-FA TAB 27-0.8 MG 1 TABLET: 27-0.8 TAB at 09:16

## 2017-02-20 RX ADMIN — GABAPENTIN 600 MG: 300 CAPSULE ORAL at 09:15

## 2017-02-20 RX ADMIN — IBUPROFEN 800 MG: 400 TABLET ORAL at 12:44

## 2017-02-20 RX ADMIN — Medication 58 MG: at 09:16

## 2017-02-20 RX ADMIN — GABAPENTIN 600 MG: 300 CAPSULE ORAL at 19:47

## 2017-02-20 RX ADMIN — GABAPENTIN 600 MG: 300 CAPSULE ORAL at 14:37

## 2017-02-20 RX ADMIN — IBUPROFEN 800 MG: 400 TABLET ORAL at 19:48

## 2017-02-20 RX ADMIN — ACETAMINOPHEN 650 MG: 325 TABLET, FILM COATED ORAL at 19:47

## 2017-02-20 RX ADMIN — QUETIAPINE 25 MG: 25 TABLET, FILM COATED ORAL at 18:15

## 2017-02-20 RX ADMIN — METHADONE HYDROCHLORIDE 57 MG: 10 CONCENTRATE ORAL at 16:41

## 2017-02-20 RX ADMIN — SENNOSIDES AND DOCUSATE SODIUM 2 TABLET: 8.6; 5 TABLET ORAL at 09:15

## 2017-02-20 RX ADMIN — SENNOSIDES AND DOCUSATE SODIUM 1 TABLET: 8.6; 5 TABLET ORAL at 19:47
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
## 2017-02-20 NOTE — CONSULTS
Patient seen and chart reviewed. Note dictated (initial)    Would continue current psychiatric meds, provide a 30 day supply.  Add vitamin D 5,000 IU per day   page me at [phone] as needed

## 2017-02-20 NOTE — PROGRESS NOTES
"    [facility] [place]  MATERNAL CHILD HEALTH   SOCIAL WORK ASSESSMENT NOTE      DATA:     Ileana delivered her son, Harrison at 37.1 weeks gestation and was admitted to NICU. Ileana has a mental health history including anxiety, depression, and PTSD. She has a polysubstance use history with her drug of choice being heroin.     She reports that she is currently in a methadone treatment program through [facility]. She is currently residing in [facility] residential housing with her 2 yr old son, Alfa. She currently has an on-going CPS worker involved with her older son with plans of having her case closed in April. Ileana plans on signing an BARBARA for [place] and CPS worker. These were left at her son's NICU bedside.     Ileana reports that her main support system lives in [place] and primarily consists of her older son's paternal grandparents. They are currently caring for Alfa and are able to do so for the next couple of days.     Ileana's mental health continues to be a concern for her. She states her symptoms were more intense during pregnancy. She reports feeling overwhelmed and anxious with parenting a toddler and a . She scored an 18 on her Hosford. She denies having thoughts of harming self or others. SW discussed [facility] and Post Partum Support [place].     Ileana states that she is considering adoption and plans to parent for 2-4 weeks to help her make her decision. She has contact information for adoption resources and denies any further need for assistance with this. She states that her primary reason for placement would be due to finances.     Ileana intends to stay in a boarding room to work on feedings. She expresses concern with meals and gas.     Ileana states that she has a desire to get off methadone as she feels that \"it's just another addiction.\" Ileana was interested in being informed of the Sherley scoring symptoms as she expressed concern with Harrison's " withdrawal symptoms increasing.     INTERVENTION:     This  reviewed the chart and coordinated with the health care team. This  introduced myself and my role as their Maternal-Child Health , including role and scope of practice. I met with the patient today to assess for needs, offer support, assess for coping and review hospital and community resources.   Provided supportive counseling related to extended hospitalization and NICU admission.    Shared information on parking, boarding rooms.  Validated and normalized expressed emotions.   Provided emotional support and active listening.  Provided gas card, grocery card.   Discussed parking pass options. Mom to be in touch with her anticipated needs.   BARBARA's left bedside for mom to sign for [facility] and on-going CPS worker for pt's 2 yr old son.     ASSESSMENT:     Pt appears to be coping adequately to this hospitalization. She is able to verbalize her symptoms related to her anxiety and depression and a desire for proper treatment. It will be beneficial for her to have a plan for treatment of her mental health symptoms. Pt easily engages and is able to verbally express herself and identify needs. Support system appears limited, and not local as they are in [place]. Patient appreciative and receptive to social work visit.  Pt is aware of social work support and availability.     PLAN:     SW to follow for needs and support during hospitalization.  SW to follow up with [facility] and on going CPS  once BARBARA is signed.       Kjerstin Rydeen, [facility]   Social Worker  Maternal Child Health   Direct: [phone]  Pager: [phone]

## 2017-02-20 NOTE — PLAN OF CARE
Problem: Goal Outcome Summary  Goal: Goal Outcome Summary  Outcome: Improving  Pt c/o cramping but states pain is controlled with Ibuprofen and Tylenol. She is going down to NICU frequently to feed baby Harrison and is pumping after feedings.  She requested Nicotine gum, but MD recommended that patch and gum can't be used together. She will continue with patch for now.  Continue postpartum care. Plan is for discharge to NICU boarding room tomorrow.

## 2017-02-20 NOTE — PROVIDER NOTIFICATION
Discussed with Dr Delacruz need for documentation in medical record of discussion with patient about her suicidal ideation as expressed on [place] Screen. Also, patient requests that she be restarted on Seroquil. Dr Delacruz states she will inform the resident who discussed these items with patient, to document them and write pertinent orders.

## 2017-02-20 NOTE — DISCHARGE SUMMARY
VAGINAL DELIVERY DISCHARGE SUMMARY    Admit date: 2017  Discharge date: 2017    Admit Dx:   - 21 year old  at 37w1d  - Spontaneous labor  - Rh positive status  - Chronic HTN  - Tobacco use  - Anxiety/depression    Discharge Dx:  - Same as above, s/p     Procedures:  - Spontaneous vaginal delivery    Admit HPI:  Ileana Thorne is a 21 year old  who presented at 37w1d for evaluation of back pain, loss of mucous plug and cramping. She was noted to be 4 cm dilated and darlin irregularly and relatively comfortably. On recheck she was noted to be 6 cm dilated and she was admitted for management of labor. Please see her admit H&P for full details of her PMH, PSH, Meds, Allergies and exam on admit.    Hospital course:  She progressed spontaneously to 8 cm and received an epidural for pain control. AROM to meconium stained fluid was then undertaken at 0032. She was noted to be 10/100/-1 station. She attempted to push without significant descent. She then labored down and had recurrent late decelerations which began at 0147 with moderate variability at baseline in between. She then developed fetal bradycardia at 0157. This did not resolve with intrauterine rescucitation and cervical exam was significant for 10/100/+3, she pushed and delivered a single viable male infant in OA position and cephalic presentation. The infant was delivered at 0202 on 2017 with apgars of 4/7/9 weighing 3010 grams with Arterial gas pH of 7.15, base excess of 4.2. Venous gas pH 7.22 base excess 1.5. Placenta followed with controlled cord traction. She sustained a first degree laceration that was repaired using a running locked stitch of 0 vicryl.  mL.  Please see her Delivery Summary for full details regarding her delivery.    Her postpartum course was complicated by her history of anxiety and depression. EPDS score of 18.  She was seen be psychiatry and social work while inpatient. On PPD#2, she was meeting  all of her postpartum goals and deemed stable for discharge. She was voiding without difficulty, tolerating a regular diet without nausea and vomiting, her pain was well controlled on oral pain medicines and her lochia was appropriate. Her hemoglobin prior to delivery was 12.4 and after delivery was 11.0. Her Rh status was positive, and Rhogam was not indicated. At the time of discharge, she was pumping feeding her infant and desired POPs for contraception.    Discharge/Disposition:  Ms. Ileana Thorne was discharged to home in stable condition with the following instructions/medications:  - Call for temperature > 100.4, foul smelling vaginal discharge, bleeding > 1 pad per hour x 2 hrs, pain not controlled by oral pain meds, severe constipation or severe nausea or vomiting.  - She was instructed to follow-up with her primary OB in 6 weeks for a routine postpartum visit and in 1 week for a mood check.  - She was instructed to continue her PNV on discharge if she wished to breast feed her infant.  - She was discharged home with the following medications:     Review of your medicines      START taking       Dose / Directions    Cholecalciferol 5000 UNITS Tabs   Used for:  Anxiety        Dose:  5000 Units   Take 5,000 Units by mouth daily   Quantity:  60 tablet   Refills:  0       ibuprofen 600 MG tablet   Commonly known as:  ADVIL/MOTRIN   Used for:   (normal spontaneous vaginal delivery)        Dose:  600 mg   Take 1 tablet (600 mg) by mouth every 6 hours as needed for moderate pain   Quantity:  40 tablet   Refills:  0       norethindrone 0.35 MG per tablet   Commonly known as:  MICRONOR   Used for:  Encounter for other contraceptive management        Dose:  1 tablet   Take 1 tablet (0.35 mg) by mouth daily   Quantity:  90 tablet   Refills:  1       QUEtiapine 25 MG tablet   Commonly known as:  SEROquel   Used for:  Anxiety        Dose:  25 mg   Take 1 tablet (25 mg) by mouth 2 times daily as needed   Quantity:  60  tablet   Refills:  0       senna-docusate 8.6-50 MG per tablet   Commonly known as:  SENOKOT-S;PERICOLACE   Used for:   (normal spontaneous vaginal delivery)        Dose:  1-2 tablet   Take 1-2 tablets by mouth 2 times daily   Quantity:  60 tablet   Refills:  0         CONTINUE these medicines which may have CHANGED, or have new prescriptions. If we are uncertain of the size of tablets/capsules you have at home, strength may be listed as something that might have changed.       Dose / Directions    * GABAPENTIN PO   Indication:  Nerve Pain, anxiety   This may have changed:  Another medication with the same name was added. Make sure you understand how and when to take each.        Dose:  600 mg   Take 600 mg by mouth 4 times daily   Refills:  0       * gabapentin 300 MG capsule   Commonly known as:  NEURONTIN   Indication:  Nerve Pain, anxiety   This may have changed:  You were already taking a medication with the same name, and this prescription was added. Make sure you understand how and when to take each.   Used for:  Generalized anxiety disorder        Dose:  600 mg   Take 2 capsules (600 mg) by mouth 4 times daily   Quantity:  120 capsule   Refills:  0       * VIIBRYD PO   This may have changed:  Another medication with the same name was added. Make sure you understand how and when to take each.        Dose:  40 mg   Take 40 mg by mouth daily   Refills:  0       * vilazodone 20 MG Tabs tablet   Commonly known as:  VIIBRYD   This may have changed:  You were already taking a medication with the same name, and this prescription was added. Make sure you understand how and when to take each.   Used for:  Generalized anxiety disorder        Dose:  60 mg   Take 3 tablets (60 mg) by mouth daily   Quantity:  90 tablet   Refills:  0       * Notice:  This list has 4 medication(s) that are the same as other medications prescribed for you. Read the directions carefully, and ask your doctor or other care provider to  review them with you.      CONTINUE these medicines which have NOT CHANGED       Dose / Directions    ALBUTEROL IN        Refills:  0       aspirin 81 MG tablet   Used for:  Hx of preeclampsia, prior pregnancy, currently pregnant, second trimester        Dose:  81 mg   Take 1 tablet (81 mg) by mouth daily   Quantity:  90 tablet   Refills:  2       breast pump Misc   Used for:  Lactation disorder        Dose:  1 each   1 each daily as needed   Quantity:  1 each   Refills:  0       doxylamine 25 MG Tabs tablet   Commonly known as:  UNISOM   Used for:  Supervision of high risk pregnancy in second trimester        Dose:  25 mg   Take 1 tablet (25 mg) by mouth At Bedtime   Quantity:  30 each   Refills:  1       METHADONE HCL PO        Dose:  70 mg   Take 70 mg by mouth daily   Refills:  0       nicotine 21 MG/24HR 24 hr patch   Commonly known as:  NICODERM CQ   Used for:  HRP (high risk pregnancy), second trimester        Dose:  1 patch   Place 1 patch onto the skin every 24 hours   Quantity:  30 patch   Refills:  1       order for DME   Used for:  Bilateral sciatica        Maternity Belt order   Quantity:  1 each   Refills:  0       prenatal multivitamin  plus iron 27-0.8 MG Tabs per tablet        Dose:  1 tablet   Take 1 tablet by mouth daily   Refills:  0       zolpidem 5 MG tablet   Commonly known as:  AMBIEN   Used for:  Insomnia        Dose:  5 mg   Take 1 tablet (5 mg) by mouth nightly as needed for sleep   Quantity:  30 tablet   Refills:  1         STOP taking          ABILIFY PO                Where to get your medicines      These medications were sent to [facility] [place], [place] - [address]  [address] [address]     Phone:  [phone]      Cholecalciferol 5000 UNITS Tabs     gabapentin 300 MG capsule     ibuprofen 600 MG tablet     norethindrone 0.35 MG per tablet     QUEtiapine 25 MG tablet     senna-docusate 8.6-50 MG per tablet     vilazodone 20 MG Tabs  tablet           Rosetta Delacruz MD  OB/GYN Pgy2    Staff MD Note    I evaluated the patient on the day of discharge.  We reviewed discharge instructions.  Patient stable for discharge.    Deepti Meyers MD

## 2017-02-20 NOTE — PROVIDER NOTIFICATION
02/20/17 1300   Provider Notification   Provider Name/Title Dr. Tao   Method of Notification Electronic Page   Request Evaluate-Remote   Notification Reason Medication Request   Pt requesting psych meds be ordered per recommendations by psychiatry (see note)

## 2017-02-20 NOTE — PROGRESS NOTES
"Notified of patient remarks on EPDS of \"hardly ever\" on thoughts of suicide.    Patient currently denies any thoughts or plan of harming herself.  Discussed that if at any point these feelings changed to let nurse/MDs know. Discussed that social work, psychiatry and  mental health consults are ordered for tomorrow to help guide clinical treatment.  Home medications for anxiety depression are Abilify, Viibryd, and seroquel.  Will discuss with psychiatry restarting these medicines.    Rosetta Delacruz MD   OB/GYN PGY2  "

## 2017-02-20 NOTE — PLAN OF CARE
"Problem: Goal Outcome Summary  Goal: Goal Outcome Summary  Outcome: Improving  Vital signs and assessments WDL. Patient appears to be slightly anxious/agitated and verbalizes she would like to be \"left alone\" and will call if she needs anything. Patient states pain is well-controlled with medication, see MAR. Nicotine patch in place. Voiding, no BM since delivery but passing gas. Ambulating independently, visits NICU, instructed to call for assistance. Pumping on unit after feeding baby in NICU, no colostrum present, encouraged to breast massage. Speaks positively about baby. Patient requested lanolin for sore nipples. Will continue to monitor.       "

## 2017-02-20 NOTE — PROGRESS NOTES
Post Partum Progress Note    Subjective:  Patient is doing well.  No complaints.  Lochia around level of menses.  Pain well controlled on PO pain meds - some minor cramping.  Tolerating PO and ambulating without any issues.  Denies any fever, chills, SOB, chest pain, N/V,  headache, dizziness.  Planning on breast feeding and breast pumping. Would like to discuss psych med regimen with team today.     Objective:  Patient Vitals for the past 24 hrs:   BP Temp Temp src Pulse Heart Rate Resp SpO2   17 0030 128/79 97.9  F (36.6  C) Oral - 80 16 -   17 1600 140/84 97.8  F (36.6  C) Oral - 70 18 -   17 1321 140/86 98.1  F (36.7  C) Oral - 67 18 97 %   17 1120 - - - - 77 20 97 %   17 0840 149/83 97.8  F (36.6  C) Oral 76 20 - 96 %       General:  AAOx3, NAD, appears generally well  CV:  RRR, no murmurs, rubs  Resp:  CTAB, no wheezes, rales  Abd:  Soft, nontender, nondistended, fundus firm at approximately at level of umbilicus; normoactive bowel sounds  Ext:  1+ edema in bilateral LE      Assessment and Plan:  21 year old  post-partum day 1 from [facility].  Afebrile and vital signs within normal limits.  Doing well without any complaints.    (1) Chronic HTN: on no meds, HELLP labs on admit wnl.  Pt with history of Pre-eclampsia in prior pregnancy.  BP's mild range yesterday, but overnight now normal range.  Will continue to monitor today.   (2) Anxiety and depression: on abilify, viibryd, and seroquel.  Took viibryd yesterday only.  Pt breastfeeding so concerns with entry into milk supply.     - Psych consult today to assess med regimen  (3) History of PSA on methadone: continue methadone 58mg AM and 57mg in PM  (4) Resolving URI: robitussin for ongoing cough, lungs clear on exam, pt afebrile    -Hgb 12.4>> am Hgb pending, asymptomatic  -Rh positive; no rhogam needed  -Rubella status unknown - ordered and and pending for today  -breast feeding and breast pumping - concern regarding  which psychiatric medications to use in PP period  -did not discuss birth control this am, will address later today    Dispo: likely PPD#2      Юлия Tao MD, MPH  OB/GYN;  PGY-2  Pager: [phone]  2/20/2017    Appreciate note by Dr. Tao. Patient has been seen and examined by me separate from the resident, agree with above note. Appreciate psych note, patient will restart seroquel, no abilify. Plan discharge home tomorrow.     Willa Cordero MD  2:08 PM

## 2017-02-20 NOTE — PLAN OF CARE
Problem: Goal Outcome Summary  Goal: Goal Outcome Summary  Outcome: Improving  Patient states she has no intention or plan to harm herself at this time.  Resident discussed with her also, and verified these sentiments.  Patient surrendered home medication Seroquil, which was counted with a second nurse and picked up by . She spent much of the evening with son in NICU. States ibuprofen and tylenol are adequate for control of perineal discomfort. Using breast pump independently.

## 2017-02-21 VITALS
OXYGEN SATURATION: 99 % | HEIGHT: 65 IN | HEART RATE: 74 BPM | DIASTOLIC BLOOD PRESSURE: 97 MMHG | RESPIRATION RATE: 16 BRPM | TEMPERATURE: 97.8 F | SYSTOLIC BLOOD PRESSURE: 144 MMHG

## 2017-02-21 LAB — COPATH REPORT: NORMAL

## 2017-02-21 PROCEDURE — 25000132 ZZH RX MED GY IP 250 OP 250 PS 637: Performed by: OBSTETRICS & GYNECOLOGY

## 2017-02-21 RX ORDER — NORETHINDRONE ACETATE 5 MG
5 TABLET ORAL DAILY
Qty: 84 TABLET | Refills: 1 | Status: SHIPPED
Start: 2017-02-21 | End: 2017-02-21

## 2017-02-21 RX ORDER — ACETAMINOPHEN AND CODEINE PHOSPHATE 120; 12 MG/5ML; MG/5ML
1 SOLUTION ORAL DAILY
Qty: 90 TABLET | Refills: 1 | Status: ON HOLD | OUTPATIENT
Start: 2017-02-21 | End: 2017-05-15

## 2017-02-21 RX ORDER — VILAZODONE HYDROCHLORIDE 20 MG/1
60 TABLET ORAL DAILY
Qty: 90 TABLET | Refills: 0 | Status: SHIPPED | OUTPATIENT
Start: 2017-02-21 | End: 2017-03-20

## 2017-02-21 RX ORDER — QUETIAPINE FUMARATE 25 MG/1
25 TABLET, FILM COATED ORAL 2 TIMES DAILY PRN
Qty: 60 TABLET | Refills: 0 | Status: ON HOLD | OUTPATIENT
Start: 2017-02-21 | End: 2017-05-15

## 2017-02-21 RX ORDER — GABAPENTIN 300 MG/1
600 CAPSULE ORAL 4 TIMES DAILY
Qty: 120 CAPSULE | Refills: 0 | Status: ON HOLD | OUTPATIENT
Start: 2017-02-21 | End: 2017-05-15

## 2017-02-21 RX ADMIN — VILAZODONE HYDROCHLORIDE 40 MG: 40 TABLET ORAL at 07:58

## 2017-02-21 RX ADMIN — SENNOSIDES AND DOCUSATE SODIUM 1 TABLET: 8.6; 5 TABLET ORAL at 07:58

## 2017-02-21 RX ADMIN — GABAPENTIN 600 MG: 300 CAPSULE ORAL at 08:10

## 2017-02-21 RX ADMIN — NICOTINE 1 PATCH: 21 PATCH, EXTENDED RELEASE TRANSDERMAL at 00:29

## 2017-02-21 RX ADMIN — QUETIAPINE 25 MG: 25 TABLET, FILM COATED ORAL at 04:57

## 2017-02-21 RX ADMIN — PRENATAL VIT W/ FE FUMARATE-FA TAB 27-0.8 MG 1 TABLET: 27-0.8 TAB at 07:58

## 2017-02-21 RX ADMIN — GUAIFENESIN 10 ML: 100 SOLUTION ORAL at 08:28

## 2017-02-21 RX ADMIN — Medication 58 MG: at 07:59

## 2017-02-21 RX ADMIN — ACETAMINOPHEN 650 MG: 325 TABLET, FILM COATED ORAL at 04:45

## 2017-02-21 RX ADMIN — GUAIFENESIN 10 ML: 100 SOLUTION ORAL at 00:29

## 2017-02-21 RX ADMIN — IBUPROFEN 800 MG: 400 TABLET ORAL at 02:28

## 2017-02-21 RX ADMIN — VITAMIN D, TAB 1000IU (100/BT) 5000 UNITS: 25 TAB at 07:59

## 2017-02-21 RX ADMIN — ACETAMINOPHEN 650 MG: 325 TABLET, FILM COATED ORAL at 00:29

## 2017-02-21 NOTE — PLAN OF CARE
Problem: Goal Outcome Summary  Goal: Goal Outcome Summary  Outcome: Adequate for Discharge Date Met:  02/21/17  PAtient is stable, denies pain nor discomfort and she is discharge today to a boarding room provided by NICU. Discharge instructions went over with her and verbalized understanding. Discharge medications given and with instructions as well.

## 2017-02-21 NOTE — PROGRESS NOTES
Post Partum Progress Note  PPD#2    Subjective:  She is resting comfortably in bed this morning.  Complains of some back and perineal pain but overall pain is improving and well controlled on current medication regimen. Tolerating PO intake.  Lochia present and minimal.  Voiding without difficulty.  Passing flatus/BM. Ambulating without dizziness or difficulty.  She denies headache, changes in vision, nausea/vomiting, chest pain, shortness of breath, RUQ pain, or worsening edema.  Plans to breastfeed, currently pumping. Baby in NICU.    Objective:  Vitals:    17 1600 17 0030 17 1000 17 1700   BP: 140/84 128/79 138/86 135/84   Pulse:   71 74   Resp: 18 16 18 20   Temp: 97.8  F (36.6  C) 97.9  F (36.6  C) 98.2  F (36.8  C) 98  F (36.7  C)   TempSrc: Oral Oral Oral Oral   SpO2:   99%    Height:           General: NAD. A&Ox3.  CV: RRR.  Pulm: CTAB. Normal respiratory effort.  Abd: Soft, non-tender, non-distended. Fundus is firm and below the umbilicus.    Assessment/Plan:  21 year old  PPD#2 from . Afebrile and vital signs within normal limits. Doing well without any complaints.     -Chronic HTN: on no meds, HELLP labs on admit wnl. Pt with history of Pre-eclampsia in prior pregnancy. BPs mild range overnight, Will continue to monitor   -Anxiety and depression: on Gabapentin, viibryd, and seroquel prn.   - Psych consult with recommendations to restart medications, however will hold off on abilify given breastfeeding concerns  -History of PSA on methadone: continue methadone 58mg AM and 57mg in PM  -Hgb 12.4>> 11.0, asymptomatic  -Rh positive; no rhogam needed  -Rubella immune  -breast feeding and breast pumping - concern regarding which psychiatric medications to use in PP period  - Birth control: dicussed with patient and she would like more information, considering POPs  - Disposition; Discharge to boarder status today    Rosetta Delacruz MD  OBGYN PGY-2  12:15 AM  2/21/2017    Staff MD Note    I appreciate the note by Dr. Delacruz.  Any necessary changes have been made by me.  I evaluated the patient with the resident and agree with the assessment and plan.  Confirmed with patient Viibryd prescription through Dr. Engle at [facility] and typically gets through [facility] pharmacy at [address].  Talked with pharmacist there and awaiting PA from Dr. Engle which has been sent to the clinic.  Called Dr. Engle practice and left VM regarding plan for patient discharge and need for refill of medication.  Gave her my pager number to call with concerns.  Also called [facility] to ensure patient can receive methadone  tomorrow.  They requested a letter to be given to patient that states she was receiving here.  Ileana states she DOES have one afternoon dose at home that she will be able to get for herself from home for her afternoon dosing today assuming discharge prior to dose due this afternoon.  Letter written for patient and placed in her chart.  Discussed discharge instructions and reason to return for evaluation.    Deepti Meyers MD

## 2017-02-21 NOTE — PLAN OF CARE
Problem: Goal Outcome Summary  Goal: Goal Outcome Summary  Outcome: No Change  VSS except BP slightly elevated. Postpartum checks WDL. Pain managed with tylenol and ibuprofen. Using tucks, ice packs, and hot packs. Up independently. Voiding without difficulty. Down to NICU to breastfeed. Pumping after most feedings. Nicotine patch fell off so writer disposed of the old patch and applied a new patch. Morning dose of seroquel given early per patient request and okay with MABEL Delacruz. Will continue to monitor.

## 2017-02-22 ENCOUNTER — LACTATION ENCOUNTER (OUTPATIENT)
Age: 22
End: 2017-02-22

## 2017-02-22 NOTE — LACTATION NOTE
This note was copied from a baby's chart.  D:  I met briefly with Ileana (Rn stated mom wanted to talk to me).  I:  She stated she had a question but could not remember what it was, that she would call when she remembers.  P:  Will continue to provide lactation support.    Gifty Goss, RNC, IBCLC

## 2017-02-25 ENCOUNTER — LACTATION ENCOUNTER (OUTPATIENT)
Age: 22
End: 2017-02-25

## 2017-02-25 NOTE — LACTATION NOTE
"This note was copied from a baby's chart.  D:  Attempted to meet with Ileana again.  I:  Earlier she stated she was \"too stressed\" to talk; I encouraged her to have me called when it is a better time to talk.  Later she was able to talk and asked about cold medicine (had bought \"Mucinex D\"); we talked about how decongestants theoretically could decrease supply, and to keep well hydrated and get all her pumpings in if she were to take it (and to take it as briefly as needed).  She is logging in her notebook.  She has pumped x4 since midnight for teens to 40s; we talked about how with increased frequency there is less milk per pumping, but more milk overall.  We talked about ways to stay breast oriented when he is so frantic and hard to feed.  We talked about meeting again tomorrow for more hands-on help.  A:  Working on supply, has increased frequency.  Mom would like to be able to latch baby.  P:  Will continue to provide lactation support.    Gifty Goss, RNC, IBCLC    "

## 2017-02-28 ENCOUNTER — TELEPHONE (OUTPATIENT)
Dept: PSYCHIATRY | Facility: CLINIC | Age: 22
End: 2017-02-28

## 2017-03-02 ENCOUNTER — TELEPHONE (OUTPATIENT)
Dept: OBGYN | Facility: CLINIC | Age: 22
End: 2017-03-02

## 2017-03-02 NOTE — TELEPHONE ENCOUNTER
Telephone call from Ileana requesting that one of our physicians order Klonopin 2 mg once daily that she was on before pregnancy. She states she is on a waiting list to see her psychiatrist Dr Lopes, but is having serious panic attacks. She sounded very anxious on the phone. Her pharmacy is [facility] on [address]. We will route this to Dr. Cordero. She had a vaginal delivery 2/19/17 and denies breastfeeding.

## 2017-03-02 NOTE — TELEPHONE ENCOUNTER
I don't see anything in her chart about previous klonipin dosing, nor does her inpatient psych consult mention it. It looks like she had a telephone call to psych today, possibly they can address. If her anxiety is that bad she should be evaluated in [facility] or in clinic.     Willa Cordero MD

## 2017-03-02 NOTE — TELEPHONE ENCOUNTER
Telephone call to Ileana (please see note from Dr. Cordero), we recommended that if she does not here back from her psychiatrist she should go to the [facility] and she verbalized understanding and requests the address and this was given to her.

## 2017-03-03 ENCOUNTER — HOSPITAL ENCOUNTER (EMERGENCY)
Facility: CLINIC | Age: 22
Discharge: HOME | End: 2017-03-03
Attending: EMERGENCY MEDICINE
Payer: MEDICAID

## 2017-03-03 ENCOUNTER — HOSPITAL ENCOUNTER (EMERGENCY)
Facility: CLINIC | Age: 22
Discharge: HOME | End: 2017-03-03
Attending: EMERGENCY MEDICINE | Admitting: EMERGENCY MEDICINE
Payer: MEDICAID

## 2017-03-03 VITALS
TEMPERATURE: 97.6 F | OXYGEN SATURATION: 95 % | HEART RATE: 86 BPM | DIASTOLIC BLOOD PRESSURE: 91 MMHG | SYSTOLIC BLOOD PRESSURE: 137 MMHG | RESPIRATION RATE: 16 BRPM

## 2017-03-03 VITALS
DIASTOLIC BLOOD PRESSURE: 118 MMHG | RESPIRATION RATE: 28 BRPM | HEART RATE: 82 BPM | SYSTOLIC BLOOD PRESSURE: 150 MMHG | TEMPERATURE: 98 F | OXYGEN SATURATION: 96 %

## 2017-03-03 DIAGNOSIS — R03.0 ELEVATED BLOOD PRESSURE READING WITHOUT DIAGNOSIS OF HYPERTENSION: ICD-10-CM

## 2017-03-03 DIAGNOSIS — K08.89 PAIN, DENTAL: ICD-10-CM

## 2017-03-03 DIAGNOSIS — F41.9 ANXIETY: ICD-10-CM

## 2017-03-03 DIAGNOSIS — R60.0 BILATERAL EDEMA OF LOWER EXTREMITY: ICD-10-CM

## 2017-03-03 LAB
ALBUMIN SERPL-MCNC: 3.2 G/DL (ref 3.4–5)
ALP SERPL-CCNC: 128 U/L (ref 40–150)
ALT SERPL W P-5'-P-CCNC: 26 U/L (ref 0–50)
AMPHETAMINES UR QL SCN: NORMAL
ANION GAP SERPL CALCULATED.3IONS-SCNC: 8 MMOL/L (ref 3–14)
AST SERPL W P-5'-P-CCNC: 24 U/L (ref 0–45)
BARBITURATES UR QL: NORMAL
BASOPHILS # BLD AUTO: 0 10E9/L (ref 0–0.2)
BASOPHILS NFR BLD AUTO: 0.2 %
BENZODIAZ UR QL: NORMAL
BILIRUB SERPL-MCNC: 0.3 MG/DL (ref 0.2–1.3)
BUN SERPL-MCNC: 16 MG/DL (ref 7–30)
CALCIUM SERPL-MCNC: 9 MG/DL (ref 8.5–10.1)
CANNABINOIDS UR QL SCN: NORMAL
CHLORIDE SERPL-SCNC: 105 MMOL/L (ref 94–109)
CO2 SERPL-SCNC: 28 MMOL/L (ref 20–32)
COCAINE UR QL: NORMAL
CREAT SERPL-MCNC: 0.83 MG/DL (ref 0.52–1.04)
CREAT UR-MCNC: 40 MG/DL
DIFFERENTIAL METHOD BLD: NORMAL
EOSINOPHIL # BLD AUTO: 0.3 10E9/L (ref 0–0.7)
EOSINOPHIL NFR BLD AUTO: 3.5 %
ERYTHROCYTE [DISTWIDTH] IN BLOOD BY AUTOMATED COUNT: 13 % (ref 10–15)
ETHANOL UR QL SCN: NORMAL
GFR SERPL CREATININE-BSD FRML MDRD: 85 ML/MIN/1.7M2
GLUCOSE SERPL-MCNC: 103 MG/DL (ref 70–99)
HCT VFR BLD AUTO: 38.1 % (ref 35–47)
HGB BLD-MCNC: 12.3 G/DL (ref 11.7–15.7)
IMM GRANULOCYTES # BLD: 0 10E9/L (ref 0–0.4)
IMM GRANULOCYTES NFR BLD: 0.4 %
LYMPHOCYTES # BLD AUTO: 2.3 10E9/L (ref 0.8–5.3)
LYMPHOCYTES NFR BLD AUTO: 27.2 %
MCH RBC QN AUTO: 31 PG (ref 26.5–33)
MCHC RBC AUTO-ENTMCNC: 32.3 G/DL (ref 31.5–36.5)
MCV RBC AUTO: 96 FL (ref 78–100)
MONOCYTES # BLD AUTO: 0.4 10E9/L (ref 0–1.3)
MONOCYTES NFR BLD AUTO: 4.9 %
NEUTROPHILS # BLD AUTO: 5.3 10E9/L (ref 1.6–8.3)
NEUTROPHILS NFR BLD AUTO: 63.8 %
NRBC # BLD AUTO: 0 10*3/UL
NRBC BLD AUTO-RTO: 0 /100
OPIATES UR QL SCN: NORMAL
PLATELET # BLD AUTO: 219 10E9/L (ref 150–450)
POTASSIUM SERPL-SCNC: 4.1 MMOL/L (ref 3.4–5.3)
PROT SERPL-MCNC: 7 G/DL (ref 6.8–8.8)
PROT UR-MCNC: 0.14 G/L
PROT/CREAT 24H UR: 0.34 G/G CR (ref 0–0.2)
RBC # BLD AUTO: 3.97 10E12/L (ref 3.8–5.2)
SODIUM SERPL-SCNC: 141 MMOL/L (ref 133–144)
WBC # BLD AUTO: 8.3 10E9/L (ref 4–11)

## 2017-03-03 PROCEDURE — 36415 COLL VENOUS BLD VENIPUNCTURE: CPT | Performed by: FAMILY MEDICINE

## 2017-03-03 PROCEDURE — 80053 COMPREHEN METABOLIC PANEL: CPT | Performed by: EMERGENCY MEDICINE

## 2017-03-03 PROCEDURE — 25000132 ZZH RX MED GY IP 250 OP 250 PS 637: Performed by: EMERGENCY MEDICINE

## 2017-03-03 PROCEDURE — 90791 PSYCH DIAGNOSTIC EVALUATION: CPT

## 2017-03-03 PROCEDURE — 99207 ZZC APP CREDIT; MD BILLING SHARED VISIT: CPT | Mod: Z6 | Performed by: FAMILY MEDICINE

## 2017-03-03 PROCEDURE — 99284 EMERGENCY DEPT VISIT MOD MDM: CPT | Mod: Z6 | Performed by: EMERGENCY MEDICINE

## 2017-03-03 PROCEDURE — 85025 COMPLETE CBC W/AUTO DIFF WBC: CPT | Performed by: EMERGENCY MEDICINE

## 2017-03-03 PROCEDURE — 99285 EMERGENCY DEPT VISIT HI MDM: CPT | Mod: 25 | Performed by: FAMILY MEDICINE

## 2017-03-03 PROCEDURE — 84156 ASSAY OF PROTEIN URINE: CPT | Mod: XU | Performed by: EMERGENCY MEDICINE

## 2017-03-03 PROCEDURE — 80320 DRUG SCREEN QUANTALCOHOLS: CPT | Performed by: FAMILY MEDICINE

## 2017-03-03 PROCEDURE — 80307 DRUG TEST PRSMV CHEM ANLYZR: CPT | Performed by: FAMILY MEDICINE

## 2017-03-03 RX ORDER — POTASSIUM CHLORIDE 1500 MG/1
40 TABLET, EXTENDED RELEASE ORAL ONCE
Status: DISCONTINUED | OUTPATIENT
Start: 2017-03-03 | End: 2017-03-03

## 2017-03-03 RX ORDER — HYDROCHLOROTHIAZIDE 12.5 MG/1
12.5 CAPSULE ORAL DAILY
Qty: 30 CAPSULE | Refills: 0 | Status: ON HOLD | OUTPATIENT
Start: 2017-03-03 | End: 2017-05-15

## 2017-03-03 RX ORDER — ACETAMINOPHEN 325 MG/1
650 TABLET ORAL ONCE
Status: COMPLETED | OUTPATIENT
Start: 2017-03-03 | End: 2017-03-03

## 2017-03-03 RX ORDER — OLANZAPINE 5 MG/1
5 TABLET ORAL 2 TIMES DAILY PRN
Qty: 8 TABLET | Refills: 0 | Status: SHIPPED | OUTPATIENT
Start: 2017-03-03 | End: 2017-03-20

## 2017-03-03 RX ORDER — ACETAMINOPHEN 325 MG/1
TABLET ORAL
Status: DISCONTINUED
Start: 2017-03-03 | End: 2017-03-03 | Stop reason: HOSPADM

## 2017-03-03 RX ORDER — IBUPROFEN 600 MG/1
600 TABLET, FILM COATED ORAL EVERY 8 HOURS PRN
Qty: 20 TABLET | Refills: 0 | Status: ON HOLD | OUTPATIENT
Start: 2017-03-03 | End: 2017-05-15

## 2017-03-03 RX ORDER — OLANZAPINE 5 MG/1
5 TABLET, ORALLY DISINTEGRATING ORAL ONCE
Status: COMPLETED | OUTPATIENT
Start: 2017-03-03 | End: 2017-03-03

## 2017-03-03 RX ADMIN — ACETAMINOPHEN 650 MG: 325 TABLET, FILM COATED ORAL at 13:34

## 2017-03-03 RX ADMIN — OLANZAPINE 5 MG: 5 TABLET, ORALLY DISINTEGRATING ORAL at 14:25

## 2017-03-03 NOTE — ED NOTES
"Patient states she is prescribed methadone from [facility] in [place], currently \"weaning off\" per patient. Patient states she was told to come here from her therapist. Patient reports she wants to get back on Klonopin again as \"it works for me\" states patient.    "

## 2017-03-03 NOTE — CONSULTS
Gyn Consult Note    Patient Summary:  Ileana Thorne is a 21 year old female seen at the request of ED.      HPI:   Ileana Thorne is a 21 year old  PPD#14 s/p  who presents for leg swelling.  Reports swelling in both of her legs, stable from time of discharge.  Her legs were swollen prior to going home.  Her feet are tender due to the swelling and calves feel somewhat sore but not tender.  Able to ambulate.  Additionally, she has gotten a headache since being in the hospital.  This started around 12:30 PM.  Reports headache around the back of her head.  Has a history of getting headaches with increased anxiety and is currently feeling anxious.  Tried some tylenol from the ED, has not really helped yet. Pain in back of head is increased with movement of her head. She denies any fever/chills, vision change, chest pain, SOB, abdominal pain, nausea/vomiting. She has a history of preeclampsia with her first pregnancy and chronic hypertension, but was normotensive throughout prenatal care in this pregnancy. She did have several mild range pressures in the postpartum period, but HELLP labs were normal during that admisison.    She is currently in an outpatient treatment facility for a history of herion abuse called [facility]. She denies any relapses or other drug use. Anxiety was previously well controlled on clonazepam.  Stopped taking this because her treatment facility initially would not allow benzodiazepine.  Dr. Engle is her psychiatrist, but she is planning to see a psychiatrist in [place] next week who she has a longer relationship with.     ROS: Positive for anxiety. Negative for suicidal ideation. 10 point ROS negative other than stated above in HPI.    PMH:   Past Medical History   Diagnosis Date     Anxiety      Arthritis      Depressive disorder      Essential hypertension 2015     Substance abuse 2014     Uncomplicated asthma      PSHx:   Past Surgical History   Procedure Laterality Date      Appendectomy       open     Tonsillectomy & adenoidectomy       Medications:   No current facility-administered medications for this encounter.      Current Outpatient Prescriptions   Medication     hydrochlorothiazide (MICROZIDE) 12.5 MG capsule     ibuprofen (ADVIL/MOTRIN) 600 MG tablet     gabapentin (NEURONTIN) 300 MG capsule     vilazodone (VIIBRYD) 20 MG TABS tablet     QUEtiapine (SEROQUEL) 25 MG tablet     cholecalciferol 5000 UNITS TABS     norethindrone (MICRONOR) 0.35 MG per tablet     senna-docusate (SENOKOT-S;PERICOLACE) 8.6-50 MG per tablet     Misc. Devices (BREAST PUMP) MISC     nicotine (NICODERM CQ) 21 MG/24HR 24 hr patch     aspirin 81 MG tablet     order for DME     doxylamine (UNISOM) 25 MG TABS     zolpidem (AMBIEN) 5 MG tablet     METHADONE HCL PO     GABAPENTIN PO     Vilazodone HCl (VIIBRYD PO)     ALBUTEROL IN     Prenatal Vit-Fe Fumarate-FA (PRENATAL MULTIVITAMIN  PLUS IRON) 27-0.8 MG TABS     Allergies:     Allergies   Allergen Reactions     Seasonal Allergies Other (See Comments)     Social History:   Social History     Social History     Marital status: Single     Spouse name: N/A     Number of children: N/A     Years of education: N/A     Occupational History     Not on file.     Social History Main Topics     Smoking status: Current Every Day Smoker     Packs/day: 0.50     Years: 10.00     Smokeless tobacco: Never Used     Alcohol use No     Drug use: No      Comment: heroin last used about 9/13/16     Sexual activity: Yes     Partners: Male     Birth control/ protection: None     Other Topics Concern     Not on file     Social History Narrative     Physical Exam:   Vitals:    03/03/17 1515 03/03/17 1520 03/03/17 1525 03/03/17 1526   BP: (!) 150/101 136/89 (!) 128/106    Pulse:       Resp: 15 12  15   Temp:       TempSrc:       SpO2:    98%      Gen: Healthy appearing female in NAD  HEENT: Neck supple without tenderness to palpation  CV: RRR, no murmurs/rubs/gallops; peripheral  pulses 2+ bilaterally  Pulm: CTAB, no increased work of breathing, no wheezing/rhonchi/crackles  Abd: Soft, non-tender, non-distended  Extremities: Non-tender, nonpitting edema to knee, symmetric. Mild erythema bilaterally.  Neuro: 3+ patellar reflexes bilaterally, no ankle clonus    Labs:  Results for orders placed or performed during the hospital encounter of 03/03/17   Protein random urine   Result Value Ref Range    Protein Random Urine 0.14 g/L    Protein Total Urine g/gr Creatinine 0.34 (H) 0 - 0.2 g/g Cr   Creatinine random urine   Result Value Ref Range    Creatinine Urine Random 40 mg/dL   CBC with platelets differential   Result Value Ref Range    WBC 8.3 4.0 - 11.0 10e9/L    RBC Count 3.97 3.8 - 5.2 10e12/L    Hemoglobin 12.3 11.7 - 15.7 g/dL    Hematocrit 38.1 35.0 - 47.0 %    MCV 96 78 - 100 fl    MCH 31.0 26.5 - 33.0 pg    MCHC 32.3 31.5 - 36.5 g/dL    RDW 13.0 10.0 - 15.0 %    Platelet Count 219 150 - 450 10e9/L    Diff Method Automated Method     % Neutrophils 63.8 %    % Lymphocytes 27.2 %    % Monocytes 4.9 %    % Eosinophils 3.5 %    % Basophils 0.2 %    % Immature Granulocytes 0.4 %    Nucleated RBCs 0 0 /100    Absolute Neutrophil 5.3 1.6 - 8.3 10e9/L    Absolute Lymphocytes 2.3 0.8 - 5.3 10e9/L    Absolute Monocytes 0.4 0.0 - 1.3 10e9/L    Absolute Eosinophils 0.3 0.0 - 0.7 10e9/L    Absolute Basophils 0.0 0.0 - 0.2 10e9/L    Abs Immature Granulocytes 0.0 0 - 0.4 10e9/L    Absolute Nucleated RBC 0.0    Comprehensive metabolic panel   Result Value Ref Range    Sodium 141 133 - 144 mmol/L    Potassium 4.1 3.4 - 5.3 mmol/L    Chloride 105 94 - 109 mmol/L    Carbon Dioxide 28 20 - 32 mmol/L    Anion Gap 8 3 - 14 mmol/L    Glucose 103 (H) 70 - 99 mg/dL    Urea Nitrogen 16 7 - 30 mg/dL    Creatinine 0.83 0.52 - 1.04 mg/dL    GFR Estimate 85 >60 mL/min/1.7m2    GFR Estimate If Black >90   GFR Calc   >60 mL/min/1.7m2    Calcium 9.0 8.5 - 10.1 mg/dL    Bilirubin Total 0.3 0.2 - 1.3 mg/dL     Albumin 3.2 (L) 3.4 - 5.0 g/dL    Protein Total 7.0 6.8 - 8.8 g/dL    Alkaline Phosphatase 128 40 - 150 U/L    ALT 26 0 - 50 U/L    AST 24 0 - 45 U/L     A&P:     1. Hypertension with concern for postpartum preeclampsia  - History of preeclampsia in prior pregnancy. Symptoms of concern today are headache and LE edema. Systolic pressures have been mostly normotensive with a few measurements in the low 150s, but with many mild range diastolic pressures in the 100s. UPC 3.4 today, but not clean catch urine and patient still having lochia, so likely not reliable. Other HELLP labs WNL. Recommended admission for BP monitoring and 24 hours magnesium, but patient declined. Risks of worsening disease, seizures, and death were explained to patient and she signed AMA form.  - Patient agreed to oral antihypertensive therapy. To start on hydrochlorothiazide with BP check Monday or Tuesday. Message sent to S  for scheduling.   - Patient instructed to return to hospital with worsening headache or swelling, SOB, upper abdominal pain, nausea/vomiting, vision changes.  - Patient to now be seen at [facility] for worsening anxiety.    Thank you for this consult.  Please do not hesitate to contact us with concerns or questions.       Hattie Raza DO  Ob/Gyn PGY-1  03/03/17 4:04 PM

## 2017-03-03 NOTE — ED AVS SNAPSHOT
[facility], [place], Emergency Department    [address]    [facility] [address]    Phone:  [phone]    Fax:  [phone]                                       Ileana Thorne   MRN: [id number]    Department:  [facility], Emergency Department   Date of Visit:  3/3/2017           After Visit Summary Signature Page     I have received my discharge instructions, and my questions have been answered. I have discussed any challenges I see with this plan with the nurse or doctor.    ..........................................................................................................................................  Patient/Patient Representative Signature      ..........................................................................................................................................  Patient Representative Print Name and Relationship to Patient    ..................................................               ................................................  Date                                            Time    ..........................................................................................................................................  Reviewed by Signature/Title    ...................................................              ..............................................  Date                                                            Time

## 2017-03-03 NOTE — ED NOTES
"Pt observed drinking a small amt pink liquid from an rx bottle. When asked what she was taking, pt states, \"methadone\". Receiving RN updated.  "

## 2017-03-03 NOTE — ED AVS SNAPSHOT
[facility], Emergency Department    [address]    [place] [address]    Phone:  [phone]    Fax:  [phone]                                       Ileana Thorne   MRN: [id number]    Department:  [facility], Emergency Department   Date of Visit:  3/3/2017           Patient Information     Date Of Birth          1995        Your diagnoses for this visit were:     Elevated blood pressure reading without diagnosis of hypertension     Anxiety     Routine postpartum follow-up        You were seen by Sheila Laughlin MD and Stoney Manriquez MD.        Discharge Instructions       The OB doctors have recommended you stay for blood pressure monitoring, and you are refusing. Take the new medication you've been prescribed. Follow up Monday in the OB clinic for a blood pressure check. Return if you change your mind.    Discontinue Seroquel.  May take Zyprexa 5 mg twice daily as needed for anxiety.  Please follow up with psychiatry through your residential treatment program Wednesday as discussed.  If Zyprexa is helpful, that prescription can be renewed.  Continue your other medications at current dosing.      Understanding Anxiety Disorders  Almost everyone gets nervous now and then. It s normal to have knots in your stomach before a test, or for your heart to race on a first date. But an anxiety disorder is much more than a case of nerves. In fact, its symptoms may be overwhelming. But treatment can relieve many of these symptoms. Talking to your doctor is the first step.    What are anxiety disorders?  An anxiety disorder causes intense feelings of panic and fear. These feelings may arise for no apparent reason. And they tend to recur again and again. They may prevent you from coping with life and cause you great distress. As a result, you may avoid anything that triggers your fear. In extreme cases, you may never leave the house. Anxiety disorders may cause other symptoms, such  as:    Obsessive thoughts you can t control    Constant nightmares or painful thoughts of the past    Nausea, sweating, and muscle tension    Difficulty sleeping or concentrating  What causes anxiety disorders?  Anxiety disorders tend to run in families. For some people, childhood abuse or neglect may play a role. For others, stressful life events or trauma may trigger anxiety disorders. Anxiety can trigger low self-esteem and poor coping skills.  Common anxiety disorders    Panic disorder: This causes an intense fear of being in danger.    Phobias: These are extreme fears of certain objects, places, or events.    Obsessive-compulsive disorder: This causes you to have unwanted thoughts. You also may perform certain actions over and over.    Posttraumatic stress disorder: This occurs in people who have survived a terrible ordeal. It can cause nightmares and flashbacks about the event.    Generalized anxiety disorder: This causes constant worry that can greatly disrupt your life.   Getting better  You may believe that nothing can help you. Or, you might fear what others may think. But most anxiety symptoms can be eased. Having an anxiety disorder is nothing to be ashamed of. Most people do best with treatment that combines medication and therapy. Although these aren t cures, they can help you live a healthier life.    1070-0316 [employer]. [address] [place] [address], [place] [address]. All rights reserved. This information is not intended as a substitute for professional medical care. Always follow your healthcare professional's instructions.          24 Hour Appointment Hotline       To make an appointment at any [facility], call [phone] ([phone]). If you don't have a family doctor or clinic, we will help you find one. [facility] are conveniently located to serve the needs of you and your family.             Review of your medicines      START taking        Dose / Directions Last  dose taken    hydrochlorothiazide 12.5 MG capsule   Commonly known as:  MICROZIDE   Dose:  12.5 mg   Quantity:  30 capsule        Take 1 capsule (12.5 mg) by mouth daily   Refills:  0        OLANZapine 5 MG tablet   Commonly known as:  zyPREXA   Dose:  5 mg   Quantity:  8 tablet        Take 1 tablet (5 mg) by mouth 2 times daily as needed Anxiety or sleep   Refills:  0          Our records show that you are taking the medicines listed below. If these are incorrect, please call your family doctor or clinic.        Dose / Directions Last dose taken    ALBUTEROL IN        Refills:  0        aspirin 81 MG tablet   Dose:  81 mg   Quantity:  90 tablet        Take 1 tablet (81 mg) by mouth daily   Refills:  2        breast pump Misc   Dose:  1 each   Quantity:  1 each        1 each daily as needed   Refills:  0        Cholecalciferol 5000 UNITS Tabs   Dose:  5000 Units   Quantity:  60 tablet        Take 5,000 Units by mouth daily   Refills:  0        doxylamine 25 MG Tabs tablet   Commonly known as:  UNISOM   Dose:  25 mg   Quantity:  30 each        Take 1 tablet (25 mg) by mouth At Bedtime   Refills:  1        * GABAPENTIN PO   Dose:  600 mg   Indication:  Nerve Pain, anxiety        Take 600 mg by mouth 4 times daily   Refills:  0        * gabapentin 300 MG capsule   Commonly known as:  NEURONTIN   Dose:  600 mg   Indication:  Nerve Pain, anxiety   Quantity:  120 capsule        Take 2 capsules (600 mg) by mouth 4 times daily   Refills:  0        ibuprofen 600 MG tablet   Commonly known as:  ADVIL/MOTRIN   Dose:  600 mg   Quantity:  20 tablet        Take 1 tablet (600 mg) by mouth every 8 hours as needed for moderate pain   Refills:  0        METHADONE HCL PO   Dose:  70 mg        Take 70 mg by mouth daily   Refills:  0        nicotine 21 MG/24HR 24 hr patch   Commonly known as:  NICODERM CQ   Dose:  1 patch   Quantity:  30 patch        Place 1 patch onto the skin every 24 hours   Refills:  1        norethindrone 0.35 MG  per tablet   Commonly known as:  MICRONOR   Dose:  1 tablet   Quantity:  90 tablet        Take 1 tablet (0.35 mg) by mouth daily   Refills:  1        order for DME   Quantity:  1 each        Maternity Belt order   Refills:  0        prenatal multivitamin  plus iron 27-0.8 MG Tabs per tablet   Dose:  1 tablet        Take 1 tablet by mouth daily   Refills:  0        QUEtiapine 25 MG tablet   Commonly known as:  SEROquel   Dose:  25 mg   Quantity:  60 tablet        Take 1 tablet (25 mg) by mouth 2 times daily as needed   Refills:  0        senna-docusate 8.6-50 MG per tablet   Commonly known as:  SENOKOT-S;PERICOLACE   Dose:  1-2 tablet   Quantity:  60 tablet        Take 1-2 tablets by mouth 2 times daily   Refills:  0        * VIIBRYD PO   Dose:  40 mg        Take 40 mg by mouth daily   Refills:  0        * vilazodone 20 MG Tabs tablet   Commonly known as:  VIIBRYD   Dose:  60 mg   Quantity:  90 tablet        Take 3 tablets (60 mg) by mouth daily   Refills:  0        zolpidem 5 MG tablet   Commonly known as:  AMBIEN   Dose:  5 mg   Quantity:  30 tablet        Take 1 tablet (5 mg) by mouth nightly as needed for sleep   Refills:  1        * Notice:  This list has 4 medication(s) that are the same as other medications prescribed for you. Read the directions carefully, and ask your doctor or other care provider to review them with you.            Prescriptions were sent or printed at these locations (2 Prescriptions)                   Other Prescriptions                Printed at Department/Unit printer (2 of 2)         hydrochlorothiazide (MICROZIDE) 12.5 MG capsule               OLANZapine (ZYPREXA) 5 MG tablet                Procedures and tests performed during your visit     CBC with platelets differential    Comprehensive metabolic panel    Creatinine random urine    Drug abuse screen 6 urine (tox)    Protein random urine      Orders Needing Specimen Collection     None      Pending Results     No orders found from  "3/1/2017 to 3/4/2017.            Pending Culture Results     No orders found from 3/1/2017 to 3/4/2017.            Thank you for choosing [facility]       Thank you for choosing [facility] for your care. Our goal is always to provide you with excellent care. Hearing back from our patients is one way we can continue to improve our services. Please take a few minutes to complete the written survey that you may receive in the mail after you visit with us. Thank you!        [employer] Information     [employer] lets you send messages to your doctor, view your test results, renew your prescriptions, schedule appointments and more. To sign up, go to [email or web address] . Click on \"Log in\" on the left side of the screen, which will take you to the Welcome page. Then click on \"Sign up Now\" on the right side of the page.     You will be asked to enter the access code listed below, as well as some personal information. Please follow the directions to create your username and password.     Your access code is: [email or web address]  Expires: 3/27/2017  9:00 AM     Your access code will  in 90 days. If you need help or a new code, please call your [facility] clinic or [phone].        Care EveryWhere ID     This is your Care EveryWhere ID. This could be used by other organizations to access your [facility] medical records  [id number]        After Visit Summary       This is your record. Keep this with you and show to your community pharmacist(s) and doctor(s) at your next visit.                  "

## 2017-03-03 NOTE — ED NOTES
States she has been clean since Sept 13, 2016.  States she is not using.  Declines to put gown on to have leg swelling assessed.

## 2017-03-03 NOTE — ED PROVIDER NOTES
History     Chief Complaint   Patient presents with     Leg Swelling     vaginal delivery 17.  Bilateral leg swelling started after delivery and is getting worse.  Baby is in NICU     Depression     Hx of depression, anxiety and PTSD.  Wants to get back on anxiety meds.  Denies SI, HI     Patient is a 21 year old female presenting with Depression:.   Depression       Ileana Thorne is a 21 year old,  female, status-post spontaneous vaginal delivery on 2017 (~2 weeks ago) who presents with bilateral lower extremity swelling. The patient reports that she has had worsening bilateral lower extremity swelling, most prominent in the ankles, since her delivery about 2 weeks ago. She notes that her  is currently in the NICU, as it is withdrawing from methadone. She complains of aching in the legs and feet. She also complains of right upper tooth pain for the past week, which generally occurs with eating. She does have an appointment with her dentist scheduled on Tuesday, 4 days from now. She denies fever, abdominal pain, cough, shortness of breath, nausea or vomiting. She is not currently breast feeding. She is having some vaginal bleeding since the delivery.     The patient also reports a history of PTSD, depression and anxiety, and is inquiring about getting back on her medications for these problems. Currently, she denies suicidal ideations.     Past Medical History   Diagnosis Date     Anxiety      Arthritis      Depressive disorder      Essential hypertension 2015     Substance abuse 2014     Uncomplicated asthma        Past Surgical History   Procedure Laterality Date     Appendectomy       open     Tonsillectomy & adenoidectomy         No family history on file.    Social History   Substance Use Topics     Smoking status: Current Every Day Smoker     Packs/day: 0.50     Years: 10.00     Smokeless tobacco: Never Used     Alcohol use No     Current Facility-Administered Medications    Medication     acetaminophen (TYLENOL) 325 MG tablet     Current Outpatient Prescriptions   Medication     ibuprofen (ADVIL/MOTRIN) 600 MG tablet     gabapentin (NEURONTIN) 300 MG capsule     vilazodone (VIIBRYD) 20 MG TABS tablet     QUEtiapine (SEROQUEL) 25 MG tablet     cholecalciferol 5000 UNITS TABS     senna-docusate (SENOKOT-S;PERICOLACE) 8.6-50 MG per tablet     METHADONE HCL PO     ALBUTEROL IN     Prenatal Vit-Fe Fumarate-FA (PRENATAL MULTIVITAMIN  PLUS IRON) 27-0.8 MG TABS     norethindrone (MICRONOR) 0.35 MG per tablet     Misc. Devices (BREAST PUMP) MISC     nicotine (NICODERM CQ) 21 MG/24HR 24 hr patch     aspirin 81 MG tablet     order for DME     doxylamine (UNISOM) 25 MG TABS     zolpidem (AMBIEN) 5 MG tablet     GABAPENTIN PO     Vilazodone HCl (VIIBRYD PO)        Allergies   Allergen Reactions     Seasonal Allergies Other (See Comments)         I have reviewed the Medications, Allergies, Past Medical and Surgical History, and Social History in the Epic system.      Review of Systems   HENT: Positive for dental problem (right upper ).    Cardiovascular: Positive for leg swelling (bilateral lower legs).   Musculoskeletal:        Positive for bilateral lower leg pain.   All other systems reviewed and are negative.      Physical Exam   BP: (!) 135/94  Pulse: 97  Temp: 98  F (36.7  C)  Resp: 28  SpO2: 96 %  Physical Exam   Constitutional: No distress.   HENT:   Head: Atraumatic.   Mouth/Throat: Oropharynx is clear and moist. No oropharyngeal exudate.   No dental tenderness with percussion   Eyes: Pupils are equal, round, and reactive to light. No scleral icterus.   Cardiovascular: Normal heart sounds and intact distal pulses.    Pulmonary/Chest: Breath sounds normal. No respiratory distress.   Abdominal: Soft. There is no tenderness.   Musculoskeletal: She exhibits edema. She exhibits no tenderness.   Skin: Skin is warm. No rash noted. She is not diaphoretic.   Psychiatric:   Mildly drowsy       ED  Course     ED Course     Procedures       11:44 AM  The patient was seen and examined by Dr. Laughlin in Room 7.          Critical Care time:  none               Labs Ordered and Resulted from Time of ED Arrival Up to the Time of Departure from the ED - No data to display    Assessments & Plan (with Medical Decision Making)   Patient does not have much tenderness in the tooth to percussion.  It doesn t seem like it is likely infected.  We did talk about using ibuprofen, and she states she has a dental appointment coming up.  She does have a small amount, 1 to 2+, pitting edema right around the ankles and feet.  Nothing higher up.  She is denying any other acute physical complaints this time.  Blood pressure was very slightly elevated here initially at 135/94.  I did speak with OB, they recommended performing HELP/preeclampsia labs, and repeating blood pressure.  Repeat blood pressure was elevated, though the patient was noted to be upset, at 150/118.  She had said, prior to getting this blood pressure, that she wanted to leave and come back to see the psychiatrist later.  I did discuss with her OBs recommendations.  She became tearful, and stated she just wanted to have a cigarette and get something to eat.  I told her that we can give her something to eat here and give her some nicotine replacement.  I strongly encouraged her to stay, stating that failure to diagnose preeclampsia could lead to eclampsia, which could cause seizures and even death or permanent disability.  She verbalized understanding, but states she does not wish to stay currently.  She states she will return later.  I, again, strongly encouraged to stay and did express the gravity of the situation.  She verbalized understanding.  Although she doesn t seem to be making good choices at this point, I do feel she has medical decision making capacity at this time.  She is signed out against medical advice, and encouraged return.    This part of the  medical record was transcribed by Rudolph Grullon, Medical Scribe, from a dictation done by Sheila Laughlin MD.      I have reviewed the nursing notes.    I have reviewed the findings, diagnosis, plan and need for follow up with the patient.    New Prescriptions    IBUPROFEN (ADVIL/MOTRIN) 600 MG TABLET    Take 1 tablet (600 mg) by mouth every 8 hours as needed for moderate pain       Final diagnoses:   Bilateral edema of lower extremity   Postpartum state   Anxiety   Pain, dental   Elevated blood pressure reading without diagnosis of hypertension     I, Kathe Sesay, am serving as a trained medical scribe to document services personally performed by Sheila Laughlin MD, based on the provider's statements to me.   I, Sheila Laughlin MD, was physically present and have reviewed and verified the accuracy of this note documented by Kathe Sesay.     3/3/2017   [facility], [place], EMERGENCY DEPARTMENT     Sheila Laughlin MD  03/03/17 [id number]

## 2017-03-04 NOTE — ED NOTES
Patient was seen in the emergency department with concern for elevated blood pressure, leg edema, and proteinuria in the postpartum period.  Admission was recommended by the ObGyn service however the patient declined.  Please refer to the notes by the emergency department physician.  She was subsequently sent here for evaluation of anxiety.  She has been experiencing frequent anxiety and panic attacks.  She has a long history of this.  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  Her infant is currently in the NICU being detoxed from methadone.  Staff there have been concerned about her increasing anxiety and panic.  She tells me this is a long-standing issue, but worse in the postpartum period.  She is also depressed, but denies any suicidal or homicidal ideation, denies any psychotic symptoms.  Patient was also seen by the [facility] .  Please see their extensive associated note for this patient on this visit.  The patient does not appear to meet any criteria for psychiatric admission, nor does it appear she would benefit from psychiatric admission, nor does she desire to be admitted.  Her primary desire is reinitiation of treatment with Klonopin, which she states she was treated with on a long-standing basis for anxiety in the past.  She was seen while in the hospital by Dr. Lopes of psychiatry who made no mention of treatment with clonazepam, and had a recommendation for a regimen of treatment for anxiety and depression including Vybriid, Seroquel, gabapentin, Abilify.  The patient states she has been taking all of these medications as prescribed.  The patient states she does not believe her Seroquel is working.  I advised her that we do not recommend clonazepam to be prescribed through the emergency department, particularly as she is in a chemical dependency program currently.  I recommend if she desires that treatment she discuss it with her regular psychiatrist  through that program.  She had been given Zyprexa in the ED today, would like to try that rather than Seroquel for anxiety.  She is somewhat unhappy that she is not being treated with clonazepam but I do not think that would be an appropriate medication to give in this setting.  Patient at this time appears stable for discharge.     Stoney Manriquez MD  03/03/17 [id number]

## 2017-03-04 NOTE — DISCHARGE INSTRUCTIONS
The OB doctors have recommended you stay for blood pressure monitoring, and you are refusing. Take the new medication you've been prescribed. Follow up Monday in the OB clinic for a blood pressure check. Return if you change your mind.    Discontinue Seroquel.  May take Zyprexa 5 mg twice daily as needed for anxiety.  Please follow up with psychiatry through your residential treatment program Wednesday as discussed.  If Zyprexa is helpful, that prescription can be renewed.  Continue your other medications at current dosing.      Understanding Anxiety Disorders  Almost everyone gets nervous now and then. It s normal to have knots in your stomach before a test, or for your heart to race on a first date. But an anxiety disorder is much more than a case of nerves. In fact, its symptoms may be overwhelming. But treatment can relieve many of these symptoms. Talking to your doctor is the first step.    What are anxiety disorders?  An anxiety disorder causes intense feelings of panic and fear. These feelings may arise for no apparent reason. And they tend to recur again and again. They may prevent you from coping with life and cause you great distress. As a result, you may avoid anything that triggers your fear. In extreme cases, you may never leave the house. Anxiety disorders may cause other symptoms, such as:    Obsessive thoughts you can t control    Constant nightmares or painful thoughts of the past    Nausea, sweating, and muscle tension    Difficulty sleeping or concentrating  What causes anxiety disorders?  Anxiety disorders tend to run in families. For some people, childhood abuse or neglect may play a role. For others, stressful life events or trauma may trigger anxiety disorders. Anxiety can trigger low self-esteem and poor coping skills.  Common anxiety disorders    Panic disorder: This causes an intense fear of being in danger.    Phobias: These are extreme fears of certain objects, places, or  events.    Obsessive-compulsive disorder: This causes you to have unwanted thoughts. You also may perform certain actions over and over.    Posttraumatic stress disorder: This occurs in people who have survived a terrible ordeal. It can cause nightmares and flashbacks about the event.    Generalized anxiety disorder: This causes constant worry that can greatly disrupt your life.   Getting better  You may believe that nothing can help you. Or, you might fear what others may think. But most anxiety symptoms can be eased. Having an anxiety disorder is nothing to be ashamed of. Most people do best with treatment that combines medication and therapy. Although these aren t cures, they can help you live a healthier life.    7725-9105 [employer]. [address] [place] [address], [place] [address]. All rights reserved. This information is not intended as a substitute for professional medical care. Always follow your healthcare professional's instructions.

## 2017-03-06 ENCOUNTER — TELEPHONE (OUTPATIENT)
Dept: OBGYN | Facility: CLINIC | Age: 22
End: 2017-03-06

## 2017-03-06 NOTE — TELEPHONE ENCOUNTER
----- Message from Hattie Raza DO sent at 3/3/2017  2:30 PM CST -----  Hi,    This is a [facility] postpartum patient we are seeing in the ED today. She needs to have a BP check early next week. Would you be able to call her and get her scheduled for Monday?    Thank you!  Hattie Raza

## 2017-03-06 NOTE — TELEPHONE ENCOUNTER
Left vm with our triage number for Ileana to call back. I let her know providers want her to come to clinic today for nurse visit so we can check her B/p

## 2017-03-08 ENCOUNTER — TELEPHONE (OUTPATIENT)
Dept: OBGYN | Facility: CLINIC | Age: 22
End: 2017-03-08

## 2017-03-08 ENCOUNTER — ALLIED HEALTH/NURSE VISIT (OUTPATIENT)
Dept: OBGYN | Facility: CLINIC | Age: 22
End: 2017-03-08
Attending: OBSTETRICS & GYNECOLOGY
Payer: MEDICAID

## 2017-03-08 VITALS
DIASTOLIC BLOOD PRESSURE: 75 MMHG | SYSTOLIC BLOOD PRESSURE: 116 MMHG | WEIGHT: 234.3 LBS | BODY MASS INDEX: 38.99 KG/M2 | HEART RATE: 78 BPM

## 2017-03-08 DIAGNOSIS — I10 HIGH BLOOD PRESSURE: Primary | ICD-10-CM

## 2017-03-08 PROCEDURE — 40000269 ZZH STATISTIC NO CHARGE FACILITY FEE: Mod: ZF

## 2017-03-08 NOTE — MR AVS SNAPSHOT
After Visit Summary   3/8/2017    Ileana Thorne    MRN: [id number]           Patient Information     Date Of Birth          1995        Visit Information        Provider Department      3/8/2017 3:00 PM Nurse, amy [facility]        Today's Diagnoses     High blood pressure    -  1       Follow-ups after your visit        Who to contact     Please call your clinic at [phone] to:    Ask questions about your health    Make or cancel appointments    Discuss your medicines    Learn about your test results    Speak to your doctor   If you have compliments or concerns about an experience at your clinic, or if you wish to file a complaint, please contact [facility] Patient Relations at [phone] or email us at [email or web address]         Additional Information About Your Visit        MyChart Information     [employer] is an electronic gateway that provides easy, online access to your medical records. With [employer], you can request a clinic appointment, read your test results, renew a prescription or communicate with your care team.     To sign up for [employer] visit the website at [email or web address]   You will be asked to enter the access code listed below, as well as some personal information. Please follow the directions to create your username and password.     Your access code is: [email or web address]  Expires: 3/27/2017  9:00 AM     Your access code will  in 90 days. If you need help or a new code, please contact your [facility] or call [phone] for assistance.        Care EveryWhere ID     This is your Care EveryWhere ID. This could be used by other organizations to access your [place] medical records  [id number]        Your Vitals Were     Pulse Last Period BMI (Body Mass Index)             78 2016 38.99 kg/m2          Blood Pressure from Last 3 Encounters:   17 116/75    03/03/17 (!) 137/91   03/03/17 (!) 150/118    Weight from Last 3 Encounters:   03/08/17 106.3 kg (234 lb 4.8 oz)   02/02/17 104.5 kg (230 lb 4.8 oz)   01/10/17 101.6 kg (224 lb)              Today, you had the following     No orders found for display       Primary Care Provider    Physician No Ref-Primary       No address on file        Thank you!     Thank you for choosing [facility]  for your care. Our goal is always to provide you with excellent care. Hearing back from our patients is one way we can continue to improve our services. Please take a few minutes to complete the written survey that you may receive in the mail after your visit with us. Thank you!             Your Updated Medication List - Protect others around you: Learn how to safely use, store and throw away your medicines at www.disposemymeds.org.          This list is accurate as of: 3/8/17  3:54 PM.  Always use your most recent med list.                   Brand Name Dispense Instructions for use    ALBUTEROL IN          aspirin 81 MG tablet     90 tablet    Take 1 tablet (81 mg) by mouth daily       breast pump Misc     1 each    1 each daily as needed       Cholecalciferol 5000 UNITS Tabs     60 tablet    Take 5,000 Units by mouth daily       doxylamine 25 MG Tabs tablet    UNISOM    30 each    Take 1 tablet (25 mg) by mouth At Bedtime       * GABAPENTIN PO      Take 600 mg by mouth 4 times daily       * gabapentin 300 MG capsule    NEURONTIN    120 capsule    Take 2 capsules (600 mg) by mouth 4 times daily       hydrochlorothiazide 12.5 MG capsule    MICROZIDE    30 capsule    Take 1 capsule (12.5 mg) by mouth daily       ibuprofen 600 MG tablet    ADVIL/MOTRIN    20 tablet    Take 1 tablet (600 mg) by mouth every 8 hours as needed for moderate pain       METHADONE HCL PO      Take 70 mg by mouth daily       nicotine 21 MG/24HR 24 hr patch    NICODERM CQ    30 patch    Place 1 patch onto the skin every 24 hours        norethindrone 0.35 MG per tablet    MICRONOR    90 tablet    Take 1 tablet (0.35 mg) by mouth daily       OLANZapine 5 MG tablet    zyPREXA    8 tablet    Take 1 tablet (5 mg) by mouth 2 times daily as needed Anxiety or sleep       order for DME     1 each    [employer] [place] order       prenatal multivitamin  plus iron 27-0.8 MG Tabs per tablet      Take 1 tablet by mouth daily       QUEtiapine 25 MG tablet    SEROquel    60 tablet    Take 1 tablet (25 mg) by mouth 2 times daily as needed       senna-docusate 8.6-50 MG per tablet    SENOKOT-S;PERICOLACE    60 tablet    Take 1-2 tablets by mouth 2 times daily       * VIIBRYD PO      Take 40 mg by mouth daily       * vilazodone 20 MG Tabs tablet    VIIBRYD    90 tablet    Take 3 tablets (60 mg) by mouth daily       zolpidem 5 MG tablet    AMBIEN    30 tablet    Take 1 tablet (5 mg) by mouth nightly as needed for sleep       * Notice:  This list has 4 medication(s) that are the same as other medications prescribed for you. Read the directions carefully, and ask your doctor or other care provider to review them with you.

## 2017-03-08 NOTE — NURSING NOTE
Chief Complaint   Patient presents with     Allied Health Visit     BP check    Pt also c/o numbness and tingling in her right hand and wrist. Sarina RN spoke with patient about this issue.    See MCKENZIE Sellers 3/8/2017

## 2017-03-08 NOTE — TELEPHONE ENCOUNTER
Pt called asking for Klonopin prescription.    Clinic Supervisor, Caitlyn Centeno, spoke with Dr. Cordero who declined, pt will need to go to [facility] if needing klonopin.

## 2017-03-09 ENCOUNTER — HOSPITAL ENCOUNTER (EMERGENCY)
Facility: CLINIC | Age: 22
Discharge: HOME | End: 2017-03-09
Attending: EMERGENCY MEDICINE | Admitting: EMERGENCY MEDICINE
Payer: MEDICAID

## 2017-03-09 ENCOUNTER — TELEPHONE (OUTPATIENT)
Dept: OBGYN | Facility: CLINIC | Age: 22
End: 2017-03-09

## 2017-03-09 VITALS
RESPIRATION RATE: 16 BRPM | DIASTOLIC BLOOD PRESSURE: 88 MMHG | OXYGEN SATURATION: 100 % | HEART RATE: 77 BPM | TEMPERATURE: 97.5 F | SYSTOLIC BLOOD PRESSURE: 138 MMHG

## 2017-03-09 DIAGNOSIS — R20.2 RIGHT HAND PARESTHESIA: ICD-10-CM

## 2017-03-09 DIAGNOSIS — R20.9 DISTURBANCE OF SKIN SENSATION: ICD-10-CM

## 2017-03-09 DIAGNOSIS — F11.23 OPIOID DEPENDENCE WITH WITHDRAWAL (H): ICD-10-CM

## 2017-03-09 DIAGNOSIS — F19.20: ICD-10-CM

## 2017-03-09 PROCEDURE — 99283 EMERGENCY DEPT VISIT LOW MDM: CPT | Performed by: EMERGENCY MEDICINE

## 2017-03-09 PROCEDURE — 99283 EMERGENCY DEPT VISIT LOW MDM: CPT | Mod: Z6 | Performed by: EMERGENCY MEDICINE

## 2017-03-09 RX ORDER — CLONIDINE HYDROCHLORIDE 0.1 MG/1
0.1 TABLET ORAL 2 TIMES DAILY
Qty: 4 TABLET | Refills: 0 | Status: ON HOLD | OUTPATIENT
Start: 2017-03-09 | End: 2017-05-15

## 2017-03-09 ASSESSMENT — ENCOUNTER SYMPTOMS
NUMBNESS: 1
ABDOMINAL PAIN: 0
DIARRHEA: 0
FEVER: 0
COUGH: 0
SHORTNESS OF BREATH: 0
VOMITING: 0

## 2017-03-09 NOTE — ED PROVIDER NOTES
History     Chief Complaint   Patient presents with     Wrist Pain     R wrist pain for past 3 days, no known trauma     Withdrawal     tapering off methadone, seeking clonidine for w/d sx     HPI  Ileana Thorne is a 21 year old  female, status-post spontaneous vaginal delivery on 2017 who presents to the Emergency Department with right wrist pain. She states that she has had this pain for 3 days and reports that she has had numbness and tingling in her right fingers. She notices this more when she wakes up in the morning and states that it does go away on it's own. No recent injuries or trauma.    Patient states that she is withdrawing from methadone. She reports that she has had fatigue and nausea. The patient states that she has been on methadone for about 3 months and is tapering off of it. She reports that she has been on 50 mg BID for about 3 days.  No fever, cough, shortness of breath, vomiting, or diarrhea.    I have reviewed the Medications, Allergies, Past Medical and Surgical History, and Social History in the [employer] system.    PAST MEDICAL HISTORY  Past Medical History   Diagnosis Date     Anxiety      Arthritis      Depressive disorder      Essential hypertension 2015     Substance abuse 2014     Uncomplicated asthma      PAST SURGICAL HISTORY  Past Surgical History   Procedure Laterality Date     Appendectomy       open     Tonsillectomy & adenoidectomy       FAMILY HISTORY  No family history on file.  SOCIAL HISTORY  Social History   Substance Use Topics     Smoking status: Current Every Day Smoker     Packs/day: 0.50     Years: 10.00     Smokeless tobacco: Never Used     Alcohol use No     MEDICATIONS  No current facility-administered medications for this encounter.      Current Outpatient Prescriptions   Medication     cloNIDine (CATAPRES) 0.1 MG tablet     ibuprofen (ADVIL/MOTRIN) 600 MG tablet     hydrochlorothiazide (MICROZIDE) 12.5 MG capsule     OLANZapine (ZYPREXA) 5 MG  tablet     gabapentin (NEURONTIN) 300 MG capsule     vilazodone (VIIBRYD) 20 MG TABS tablet     QUEtiapine (SEROQUEL) 25 MG tablet     cholecalciferol 5000 UNITS TABS     norethindrone (MICRONOR) 0.35 MG per tablet     senna-docusate (SENOKOT-S;PERICOLACE) 8.6-50 MG per tablet     Misc. Devices (BREAST PUMP) MISC     nicotine (NICODERM CQ) 21 MG/24HR 24 hr patch     aspirin 81 MG tablet     order for DME     doxylamine (UNISOM) 25 MG TABS     zolpidem (AMBIEN) 5 MG tablet     METHADONE HCL PO     GABAPENTIN PO     Vilazodone HCl (VIIBRYD PO)     ALBUTEROL IN     Prenatal Vit-Fe Fumarate-FA (PRENATAL MULTIVITAMIN  PLUS IRON) 27-0.8 MG TABS     ALLERGIES  Allergies   Allergen Reactions     Seasonal Allergies Other (See Comments)        Review of Systems   Constitutional: Negative for fever.   Respiratory: Negative for cough and shortness of breath.    Gastrointestinal: Negative for abdominal pain, diarrhea and vomiting.   Musculoskeletal:        Right wrist pain   Neurological: Positive for numbness (right fingers).   All other systems reviewed and are negative.      Physical Exam   BP: 138/88  Pulse: 77  Temp: 97.5  F (36.4  C)  Resp: 16  SpO2: 100 %  Physical Exam   Constitutional: No distress.   HENT:   Head: Atraumatic.   Mouth/Throat: Oropharynx is clear and moist. No oropharyngeal exudate.   Eyes: Pupils are equal, round, and reactive to light. No scleral icterus.   Cardiovascular: Normal heart sounds and intact distal pulses.    Pulmonary/Chest: Breath sounds normal. No respiratory distress.   Abdominal: Soft. Bowel sounds are normal. There is no tenderness.   Musculoskeletal: She exhibits tenderness. She exhibits no edema.        Hands:  Skin: Skin is warm. No rash noted. She is not diaphoretic.       ED Course     ED Course     Procedures             Critical Care time:  none               Labs Ordered and Resulted from Time of ED Arrival Up to the Time of Departure from the ED - No data to  display    Assessments & Plan (with Medical Decision Making)   This appears to be carpal tunnel syndrome vs ulnar nerve irritation. The patient has a positive Tinnels sign as well as a positive Phalans sign.  She reports that her tingling is more often over 4th and 5th fingers, however. She s not having any current pain or paresthesias, with the exception of the above testing.  Strength is normal, no muscle wasting, CMS is intact.  He is recently post-partum, and has had some edema -- hopefulty this will resolve on it's own with time. There has been no injuries, I don t think x-rays are going to be helpful.  She was given a Velcro cockup wrist splint and instructed to use this as much as she can. She was given the phone number for orthopedics and instructed to follow-up.  She was also given two days worth of clonidine for her concern for opiate withdrawal symptoms.  She states she plans to follow up with her opiate providers.     This part of the medical record was transcribed by Kathe Sesay Medical Scribe, from a dictation done by Dr. Qian MD.       I have reviewed the nursing notes.    I have reviewed the findings, diagnosis, plan and need for follow up with the patient.    Discharge Medication List as of 3/9/2017  5:59 PM      START taking these medications    Details   cloNIDine (CATAPRES) 0.1 MG tablet Take 1 tablet (0.1 mg) by mouth 2 times daily, Disp-4 tablet, R-0, Local Print             Final diagnoses:   Opioid dependence with withdrawal (H)   Right hand paresthesia     I, Christo Mcneal, am serving as a trained medical scribe to document services personally performed by Sheila Laughlin MD, based on the provider's statements to me.      ISheila MD, was physically present and have reviewed and verified the accuracy of this note documented by Christo Mcneal.    3/9/2017   [facility], [place], EMERGENCY DEPARTMENT     Sheila Laughlin MD  03/09/17 6111

## 2017-03-09 NOTE — DISCHARGE INSTRUCTIONS
Please make an appointment to follow up with Orthopedics (phone: [phone]) within 2 weeks.

## 2017-03-09 NOTE — ED AVS SNAPSHOT
[facility], [place], Emergency Department    [address]    [facility] [address]    Phone:  [phone]    Fax:  [phone]                                       Ileana Thorne   MRN: [id number]    Department:  [facility], Emergency Department   Date of Visit:  3/9/2017           After Visit Summary Signature Page     I have received my discharge instructions, and my questions have been answered. I have discussed any challenges I see with this plan with the nurse or doctor.    ..........................................................................................................................................  Patient/Patient Representative Signature      ..........................................................................................................................................  Patient Representative Print Name and Relationship to Patient    ..................................................               ................................................  Date                                            Time    ..........................................................................................................................................  Reviewed by Signature/Title    ...................................................              ..............................................  Date                                                            Time

## 2017-03-09 NOTE — ED AVS SNAPSHOT
[facility], Emergency Department    [address]    [facility] [place] [address]    Phone:  [phone]    Fax:  [phone]                                       Ileana Thorne   MRN: [id number]    Department:  [facility], Emergency Department   Date of Visit:  3/9/2017           Patient Information     Date Of Birth          1995        Your diagnoses for this visit were:     Carpal tunnel syndrome of right wrist     Opioid dependence with withdrawal (H)        You were seen by Sheila Laughlin MD.        Discharge Instructions       Please make an appointment to follow up with Orthopedics (phone: [phone]) within 2 weeks.       24 Hour Appointment Hotline       To make an appointment at any [facility] clinic, call [phone] ([phone]). If you don't have a family doctor or clinic, we will help you find one. [facility] clinics are conveniently located to serve the needs of you and your family.          ED Discharge Orders     Wrist splint velcro                    Review of your medicines      START taking        Dose / Directions Last dose taken    cloNIDine 0.1 MG tablet   Commonly known as:  CATAPRES   Dose:  0.1 mg   Quantity:  4 tablet        Take 1 tablet (0.1 mg) by mouth 2 times daily   Refills:  0          Our records show that you are taking the medicines listed below. If these are incorrect, please call your family doctor or clinic.        Dose / Directions Last dose taken    ALBUTEROL IN        Refills:  0        aspirin 81 MG tablet   Dose:  81 mg   Quantity:  90 tablet        Take 1 tablet (81 mg) by mouth daily   Refills:  2        breast pump Misc   Dose:  1 each   Quantity:  1 each        1 each daily as needed   Refills:  0        Cholecalciferol 5000 UNITS Tabs   Dose:  5000 Units   Quantity:  60 tablet        Take 5,000 Units by mouth daily   Refills:  0        doxylamine 25 MG Tabs tablet   Commonly known as:  UNISOM   Dose:  25 mg   Quantity:  30 each        Take 1  tablet (25 mg) by mouth At Bedtime   Refills:  1        * GABAPENTIN PO   Dose:  600 mg   Indication:  Nerve Pain, anxiety        Take 600 mg by mouth 4 times daily   Refills:  0        * gabapentin 300 MG capsule   Commonly known as:  NEURONTIN   Dose:  600 mg   Indication:  Nerve Pain, anxiety   Quantity:  120 capsule        Take 2 capsules (600 mg) by mouth 4 times daily   Refills:  0        hydrochlorothiazide 12.5 MG capsule   Commonly known as:  MICROZIDE   Dose:  12.5 mg   Quantity:  30 capsule        Take 1 capsule (12.5 mg) by mouth daily   Refills:  0        ibuprofen 600 MG tablet   Commonly known as:  ADVIL/MOTRIN   Dose:  600 mg   Quantity:  20 tablet        Take 1 tablet (600 mg) by mouth every 8 hours as needed for moderate pain   Refills:  0        METHADONE HCL PO   Dose:  70 mg        Take 70 mg by mouth daily   Refills:  0        nicotine 21 MG/24HR 24 hr patch   Commonly known as:  NICODERM CQ   Dose:  1 patch   Quantity:  30 patch        Place 1 patch onto the skin every 24 hours   Refills:  1        norethindrone 0.35 MG per tablet   Commonly known as:  MICRONOR   Dose:  1 tablet   Quantity:  90 tablet        Take 1 tablet (0.35 mg) by mouth daily   Refills:  1        OLANZapine 5 MG tablet   Commonly known as:  zyPREXA   Dose:  5 mg   Quantity:  8 tablet        Take 1 tablet (5 mg) by mouth 2 times daily as needed Anxiety or sleep   Refills:  0        order for DME   Quantity:  1 each        Maternity Belt order   Refills:  0        prenatal multivitamin  plus iron 27-0.8 MG Tabs per tablet   Dose:  1 tablet        Take 1 tablet by mouth daily   Refills:  0        QUEtiapine 25 MG tablet   Commonly known as:  SEROquel   Dose:  25 mg   Quantity:  60 tablet        Take 1 tablet (25 mg) by mouth 2 times daily as needed   Refills:  0        senna-docusate 8.6-50 MG per tablet   Commonly known as:  SENOKOT-S;PERICOLACE   Dose:  1-2 tablet   Quantity:  60 tablet        Take 1-2 tablets by mouth 2  "times daily   Refills:  0        * VIIBRYD PO   Dose:  40 mg        Take 40 mg by mouth daily   Refills:  0        * vilazodone 20 MG Tabs tablet   Commonly known as:  VIIBRYD   Dose:  60 mg   Quantity:  90 tablet        Take 3 tablets (60 mg) by mouth daily   Refills:  0        zolpidem 5 MG tablet   Commonly known as:  AMBIEN   Dose:  5 mg   Quantity:  30 tablet        Take 1 tablet (5 mg) by mouth nightly as needed for sleep   Refills:  1        * Notice:  This list has 4 medication(s) that are the same as other medications prescribed for you. Read the directions carefully, and ask your doctor or other care provider to review them with you.            Prescriptions were sent or printed at these locations (1 Prescription)                   Other Prescriptions                Printed at Department/Unit printer (1 of 1)         cloNIDine (CATAPRES) 0.1 MG tablet                Orders Needing Specimen Collection     None      Pending Results     No orders found from 3/7/2017 to 3/10/2017.            Pending Culture Results     No orders found from 3/7/2017 to 3/10/2017.            Thank you for choosing [facility]       Thank you for choosing [facility] for your care. Our goal is always to provide you with excellent care. Hearing back from our patients is one way we can continue to improve our services. Please take a few minutes to complete the written survey that you may receive in the mail after you visit with us. Thank you!        [employer] Information     [employer] lets you send messages to your doctor, view your test results, renew your prescriptions, schedule appointments and more. To sign up, go to [email or web address] . Click on \"Log in\" on the left side of the screen, which will take you to the Welcome page. Then click on \"Sign up Now\" on the right side of the page.     You will be asked to enter the access code listed below, as well as some personal information. Please follow the directions to create your " username and password.     Your access code is: [email or web address]  Expires: 3/27/2017  9:00 AM     Your access code will  in 90 days. If you need help or a new code, please call your [facility] clinic or [phone].        Care EveryWhere ID     This is your Care EveryWhere ID. This could be used by other organizations to access your [facility] medical records  [id number]        After Visit Summary       This is your record. Keep this with you and show to your community pharmacist(s) and doctor(s) at your next visit.

## 2017-03-10 NOTE — TELEPHONE ENCOUNTER
Ileana called tonight again requesting Rx for klonipin. Was in ED today for withdrawal symptoms of opiods, given 2 days of clonidine for symptoms. Was seen in [facility] last week after klonipin rx was declined by clinic- no klonipin was prescribed. She states she has a psychiatrist who will verify her need for Rx. Encouraged her to follow up with that provider or be seen again in [facility], but that our clinic will not prescribe her klonipin. She then hung up the phone.   Willa Cordero MD

## 2017-03-20 ENCOUNTER — OFFICE VISIT (OUTPATIENT)
Dept: FAMILY MEDICINE | Facility: CLINIC | Age: 22
End: 2017-03-20
Attending: FAMILY MEDICINE
Payer: MEDICAID

## 2017-03-20 VITALS
HEART RATE: 72 BPM | SYSTOLIC BLOOD PRESSURE: 122 MMHG | HEIGHT: 64 IN | DIASTOLIC BLOOD PRESSURE: 79 MMHG | WEIGHT: 230 LBS | BODY MASS INDEX: 39.27 KG/M2

## 2017-03-20 DIAGNOSIS — F11.20 PATIENT ON METHADONE MAINTENANCE THERAPY (H): Primary | ICD-10-CM

## 2017-03-20 PROCEDURE — 99213 OFFICE O/P EST LOW 20 MIN: CPT | Mod: ZF

## 2017-03-20 RX ORDER — ESCITALOPRAM OXALATE 10 MG/1
10 TABLET ORAL DAILY
Status: ON HOLD | COMMUNITY
End: 2017-05-15

## 2017-03-20 RX ORDER — BUSPIRONE HYDROCHLORIDE 15 MG/1
15 TABLET ORAL 2 TIMES DAILY
Status: ON HOLD | COMMUNITY
End: 2017-05-15

## 2017-03-20 ASSESSMENT — PAIN SCALES - GENERAL: PAINLEVEL: NO PAIN (0)

## 2017-03-20 NOTE — MR AVS SNAPSHOT
"              After Visit Summary   3/20/2017    Ileana Thorne    MRN: [id number]           Patient Information     Date Of Birth          1995        Visit Information        Provider Department      3/20/2017 3:30 PM Alanna Norman MD PhD [facility]        Today's Diagnoses     Patient on methadone maintenance therapy (H)    -  1       Follow-ups after your visit        Who to contact     Please call your clinic at [phone] to:    Ask questions about your health    Make or cancel appointments    Discuss your medicines    Learn about your test results    Speak to your doctor   If you have compliments or concerns about an experience at your clinic, or if you wish to file a complaint, please contact [facility] Patient Relations at [phone] or email us at [email or web address]         Additional Information About Your Visit        MyChart Information     [employer] is an electronic gateway that provides easy, online access to your medical records. With [employer], you can request a clinic appointment, read your test results, renew a prescription or communicate with your care team.     To sign up for [employer] visit the website at [email or web address]   You will be asked to enter the access code listed below, as well as some personal information. Please follow the directions to create your username and password.     Your access code is: [email or web address]  Expires: 3/27/2017 10:00 AM     Your access code will  in 90 days. If you need help or a new code, please contact your [facility] or call [phone] for assistance.        Care EveryWhere ID     This is your Care EveryWhere ID. This could be used by other organizations to access your [facility] medical records  [id number]        Your Vitals Were     Pulse Height Last Period BMI (Body Mass Index)          72 1.632 m (5' 4.25\") 2016 39.17 kg/m2         " Blood Pressure from Last 3 Encounters:   03/20/17 122/79   03/09/17 138/88   03/08/17 116/75    Weight from Last 3 Encounters:   03/20/17 104.3 kg (230 lb)   03/08/17 106.3 kg (234 lb 4.8 oz)   02/02/17 104.5 kg (230 lb 4.8 oz)              Today, you had the following     No orders found for display       Primary Care Provider    Physician No Ref-Primary       No address on file        Thank you!     Thank you for choosing [facility]  for your care. Our goal is always to provide you with excellent care. Hearing back from our patients is one way we can continue to improve our services. Please take a few minutes to complete the written survey that you may receive in the mail after your visit with us. Thank you!             Your Updated Medication List - Protect others around you: Learn how to safely use, store and throw away your medicines at www.disposemymeds.org.          This list is accurate as of: 3/20/17  4:50 PM.  Always use your most recent med list.                   Brand Name Dispense Instructions for use    ALBUTEROL IN          breast pump Misc     1 each    1 each daily as needed       busPIRone 15 MG tablet    BUSPAR     Take 15 mg by mouth 2 times daily       Cholecalciferol 5000 UNITS Tabs     60 tablet    Take 5,000 Units by mouth daily       cloNIDine 0.1 MG tablet    CATAPRES    4 tablet    Take 1 tablet (0.1 mg) by mouth 2 times daily       doxylamine 25 MG Tabs tablet    UNISOM    30 each    Take 1 tablet (25 mg) by mouth At Bedtime       gabapentin 300 MG capsule    NEURONTIN    120 capsule    Take 2 capsules (600 mg) by mouth 4 times daily       hydrochlorothiazide 12.5 MG capsule    MICROZIDE    30 capsule    Take 1 capsule (12.5 mg) by mouth daily       ibuprofen 600 MG tablet    ADVIL/MOTRIN    20 tablet    Take 1 tablet (600 mg) by mouth every 8 hours as needed for moderate pain       LEXAPRO 10 MG tablet   Generic drug:  escitalopram      Take 10 mg by mouth daily        METHADONE HCL PO      Take 70 mg by mouth daily       nicotine 21 MG/24HR 24 hr patch    NICODERM CQ    30 patch    Place 1 patch onto the skin every 24 hours       norethindrone 0.35 MG per tablet    MICRONOR    90 tablet    Take 1 tablet (0.35 mg) by mouth daily       order for DME     1 each    [facility] Belt order       prenatal multivitamin  plus iron 27-0.8 MG Tabs per tablet      Take 1 tablet by mouth daily       QUEtiapine 25 MG tablet    SEROquel    60 tablet    Take 1 tablet (25 mg) by mouth 2 times daily as needed       senna-docusate 8.6-50 MG per tablet    SENOKOT-S;PERICOLACE    60 tablet    Take 1-2 tablets by mouth 2 times daily       zolpidem 5 MG tablet    AMBIEN    30 tablet    Take 1 tablet (5 mg) by mouth nightly as needed for sleep

## 2017-03-20 NOTE — LETTER
3/20/2017       RE: Ileana Thorne  [address] [facility] [address]     Dear Colleague,    Thank you for referring your patient, Ileana Thorne, to the [facility] at [facility]. Please see a copy of my visit note below.    Patient is a 22 yo , approximately 4 weeks postpartum with a history of asthma, substance abuse (heroin use- clean since 2016), hypertension, depression, anxiety, and arthritis that presents for management of withdrawal symptoms.    S:  Patient states she is currently withdrawing from Methadone, wants to come off the methadone completely.  She last went to Methadone Clinic ([facility] [phone]) this morning and received 70 mg Methadone.  Patient states she is in the process of weaning and is having withdrawal symptoms.  Primary symptoms are restless legs, sore muscles, fatigue, nausea and chills.  Patient is requesting clonidine for restless leg symptoms.  She was given 4 pills of Clonidine when she went to ED 3/9/17, states it helped with symptoms. She would also like Ambien refilled.  Patient states she doesn't want to take the Methadone anymore because she doesn't have time to go to the clinic everyday.  Patient is not interested in Suboxone treatment for opiod dependence.    Medications:  Lexapro 10 mg daily  Buspar 15 mg po bid  Ibuprofen 600 mg po q 8 hours prn  Hydrochlorothiazide 12.5 mg po daily  Gabapentin 600 mg po QID  Seroquel 25 mg po qhs  Cholecalciferol 5000 units po daily  Micronor po daily  Senna daily  Nicotine patch daily  Unisom 25 mg po daily  Methadone 70 mg po daily  Albuterol  Prn  Prenatal vitamins    PMed Hx Reviewed/Epic updated  PSoc Hx Reviewed/Epic updated  Fam Hx Reviewed/Epic updated    ROS:  +chills, no fevers, +headaches, + blurry vision, no problems with ears, nose, throat, + sore throat.  No chest pain or SOB.  +Nausea, no abdominal pain, no vomiting.  No dysuria, no hematuria, no  "diarrhea, no constipation.  +weakness in upper extremities.      O:  /79 (BP Location: Left arm, Patient Position: Chair, Cuff Size: Adult Regular)  Pulse 72  Ht 1.632 m (5' 4.25\")  Wt 104.3 kg (230 lb)  LMP 06/04/2016  BMI 39.17 kg/m2   Gen:  Young female, NAD, appears sleepy.  No tremors, no dilated pupils, no shaking or restless legs.  VSS.  Exam:  Deferred    A/P:  Patient is a 20 yo female with history of heroin use (clean since 9/2016), currently on Methadone, requesting Clonidine for withdrawal symptoms and Ambien for sleep.     After interview with Ms. Thorne, I spoke with Corinne Islas (patient's clinic for Methadone maintenance) regarding protocol for patients that desire to be weaned off Methadone.  Patient has a counselor that she has access to on a daily basis.  Weaning should be discussed with patient's counselor.  Offering medications for withdrawal management was NOT advised by counselor that I spoke with on the phone (such as Clonidine).  Patient advised to return to Methadone clinic and discuss a taper with her counselor.  In addition, if a PCP is to help manage her withdrawal symptoms, a care coordination contract would need to be in place with Corinne Islas to ensure appropriate communication between providers.    After speaking with Methadone clinic, I conveyed this information to the patient.  She became upset and chose to leave the clinic after we declined to refill her Ambien as well.  Offer extended to further discuss the issue, but patient declined.    Mecca Zhang MD  [facility], PGY-2    Pt was seen and examined with Dr. Zhang.  I agree with her documentation as noted above.    My additional comments: None    Alanna Norman MD                 HPI      ROS      Physical Exam        Again, thank you for allowing me to participate in the care of your patient.      Sincerely,    Alanna Norman MD PhD      "

## 2017-03-20 NOTE — NURSING NOTE
"Chief Complaint   Patient presents with     Consult     Discuss medication. Pt c/o bilateral arm and leg \"restlessness\".  Pt states she was taken off medications and would like to start taking Clonidine again.  "

## 2017-03-20 NOTE — LETTER
Date:March 21, 2017      Patient was self referred, no letter generated. Do not send.        [facility] Information

## 2017-04-11 ENCOUNTER — TELEPHONE (OUTPATIENT)
Dept: BEHAVIORAL HEALTH | Facility: CLINIC | Age: 22
End: 2017-04-11

## 2017-04-11 ENCOUNTER — OFFICE VISIT (OUTPATIENT)
Dept: ADDICTION MEDICINE | Facility: CLINIC | Age: 22
End: 2017-04-11
Payer: COMMERCIAL

## 2017-04-11 VITALS
RESPIRATION RATE: 16 BRPM | WEIGHT: 229.5 LBS | DIASTOLIC BLOOD PRESSURE: 84 MMHG | TEMPERATURE: 98.9 F | SYSTOLIC BLOOD PRESSURE: 128 MMHG | HEART RATE: 81 BPM | OXYGEN SATURATION: 96 % | BODY MASS INDEX: 38.24 KG/M2 | HEIGHT: 65 IN

## 2017-04-11 DIAGNOSIS — F11.20 UNCOMPLICATED OPIOID DEPENDENCE (H): Primary | ICD-10-CM

## 2017-04-11 PROCEDURE — 99203 OFFICE O/P NEW LOW 30 MIN: CPT | Performed by: FAMILY MEDICINE

## 2017-04-11 NOTE — TELEPHONE ENCOUNTER
S: Dr. Gao called to notify of a pt that he will want admitted to detox to get her off of Methadone  B: Pt has been on Methadone and is at 47 mg, he told pt she needed to taper down to 30 mg before he would take her on detox to get her on Suboxone.   A: Dr. Gao anticipates pt will be ready to come in to detox in about a week.   R: Dr. Gao directed pt to contact intake once she is down to 30 mg, he did not state pt needed to be on it for any length of time, just be at 30 mg to come in to detox.

## 2017-04-11 NOTE — MR AVS SNAPSHOT
"              After Visit Summary   2017    Ileana Thorne    MRN: [id number]           Patient Information     Date Of Birth          1995        Visit Information        Provider Department      2017 10:15 AM Bola Gao [place] [facility]        Today's Diagnoses     Uncomplicated opioid dependence (H)    -  1       Follow-ups after your visit        Who to contact     If you have questions or need follow up information about today's clinic visit or your schedule please contact [facility] directly at [phone].  Normal or non-critical lab and imaging results will be communicated to you by Mailgunhart, letter or phone within 4 business days after the clinic has received the results. If you do not hear from us within 7 days, please contact the clinic through Extreme Reach (formerly BrandAds)t or phone. If you have a critical or abnormal lab result, we will notify you by phone as soon as possible.  Submit refill requests through SpotXchange or call your pharmacy and they will forward the refill request to us. Please allow 3 business days for your refill to be completed.          Additional Information About Your Visit        MyChart Information     SpotXchange lets you send messages to your doctor, view your test results, renew your prescriptions, schedule appointments and more. To sign up, go to [email or web address] . Click on \"Log in\" on the left side of the screen, which will take you to the Welcome page. Then click on \"Sign up Now\" on the right side of the page.     You will be asked to enter the access code listed below, as well as some personal information. Please follow the directions to create your username and password.     Your access code is: [email or web address]  Expires: 7/10/2017  2:07 PM     Your access code will  in 90 days. If you need help or a new code, please call your [facility] or [phone].        Care EveryWhere ID     This is your Care " "EveryWhere ID. This could be used by other organizations to access your [place] medical records  [id number]        Your Vitals Were     Pulse Temperature Respirations Height Last Period Pulse Oximetry    81 98.9  F (37.2  C) (Oral) 16 5' 5\" (1.651 m) 06/04/2016 96%    BMI (Body Mass Index)                   38.19 kg/m2            Blood Pressure from Last 3 Encounters:   04/11/17 128/84   03/20/17 122/79   03/09/17 138/88    Weight from Last 3 Encounters:   04/11/17 229 lb 8 oz (104.1 kg)   03/20/17 230 lb (104.3 kg)   03/08/17 234 lb 4.8 oz (106.3 kg)              Today, you had the following     No orders found for display       Primary Care Provider    Physician No Ref-Primary       No address on file        Thank you!     Thank you for choosing [facility]  for your care. Our goal is always to provide you with excellent care. Hearing back from our patients is one way we can continue to improve our services. Please take a few minutes to complete the written survey that you may receive in the mail after your visit with us. Thank you!             Your Updated Medication List - Protect others around you: Learn how to safely use, store and throw away your medicines at www.disposemymeds.org.          This list is accurate as of: 4/11/17  2:07 PM.  Always use your most recent med list.                   Brand Name Dispense Instructions for use    ALBUTEROL IN          breast pump Misc     1 each    1 each daily as needed       busPIRone 15 MG tablet    BUSPAR     Take 15 mg by mouth 2 times daily       Cholecalciferol 5000 UNITS Tabs     60 tablet    Take 5,000 Units by mouth daily       cloNIDine 0.1 MG tablet    CATAPRES    4 tablet    Take 1 tablet (0.1 mg) by mouth 2 times daily       doxylamine 25 MG Tabs tablet    UNISOM    30 each    Take 1 tablet (25 mg) by mouth At Bedtime       gabapentin 300 MG capsule    NEURONTIN    120 capsule    Take 2 capsules (600 mg) by mouth 4 times " daily       hydrochlorothiazide 12.5 MG capsule    MICROZIDE    30 capsule    Take 1 capsule (12.5 mg) by mouth daily       ibuprofen 600 MG tablet    ADVIL/MOTRIN    20 tablet    Take 1 tablet (600 mg) by mouth every 8 hours as needed for moderate pain       LEXAPRO 10 MG tablet   Generic drug:  escitalopram      Take 10 mg by mouth daily       METHADONE HCL PO      Take 70 mg by mouth daily       nicotine 21 MG/24HR 24 hr patch    NICODERM CQ    30 patch    Place 1 patch onto the skin every 24 hours       norethindrone 0.35 MG per tablet    MICRONOR    90 tablet    Take 1 tablet (0.35 mg) by mouth daily       order for DME     1 each    [facility] order       prenatal multivitamin  plus iron 27-0.8 MG Tabs per tablet      Take 1 tablet by mouth daily       QUEtiapine 25 MG tablet    SEROquel    60 tablet    Take 1 tablet (25 mg) by mouth 2 times daily as needed       senna-docusate 8.6-50 MG per tablet    SENOKOT-S;PERICOLACE    60 tablet    Take 1-2 tablets by mouth 2 times daily       zolpidem 5 MG tablet    AMBIEN    30 tablet    Take 1 tablet (5 mg) by mouth nightly as needed for sleep

## 2017-04-11 NOTE — PROGRESS NOTES
SUBJECTIVE:                                                    Ileana Thorne is a 21 year old female who presents to clinic today for the following health issues:          ADDICTION MEDICINE NOTE:    INITIAL VISIT  HERE WANTING TO GET OFF METHADONE AND ON SUBOXONE   HAS BEEN OPIOID DEPENDENT, SMOKING HEROIN, FOR A FEW YEARS  METHADONE MAINTENANCE THROUGH [facility] SINCE SEPT   WAS ON AS MUCH  MG BUT HAS WEANED DOWN TO 47 MG  IN TREATMENT AT [facility]    PREVIOUS TREATMENT AT [facility]    DISCUSSED DIFFERENCE BETWEEN METHADONE AND SUBOXONE   ADVISED NEEDS TO BE ON 30 MG OR LESS OF METHADONE BEFORE TRANSITION TO BUPRENORPHINE   ADVISED INDUCTION NEEDS TO BE DONE AS AN IN-PATIENT   DISCUSSED RISKS, BENEFITS, PROS, CONS OF EACH FORM OF MAT    DELIVERED CHILD 6 WEEKS AGO  BABY STILL IN NICU WITH SANTANA    Problem list and histories reviewed & adjusted, as indicated.  Additional history: as documented    Patient Active Problem List   Diagnosis     Anxiety     Depression     Essential hypertension     Generalized anxiety disorder     Methamphetamine addiction (H)     Tobacco dependence syndrome     History of pre-eclampsia in prior pregnancy, currently pregnant in second trimester     Substance abuse affecting pregnancy in second trimester, antepartum     Marginal cord insertion     HRP (high risk pregnancy), second trimester     Encounter for triage in pregnant patient     Pregnancy     Normal labor     Mixed anxiety depressive disorder     Insomnia     Pregnancy-induced hypertension     Encounter for routine postpartum follow-up     Tobacco use     Past Surgical History:   Procedure Laterality Date     APPENDECTOMY      open     TONSILLECTOMY & ADENOIDECTOMY         Social History   Substance Use Topics     Smoking status: Current Every Day Smoker     Packs/day: 0.30     Years: 10.00     Smokeless tobacco: Never Used     Alcohol use No     No family history on file.      Current Outpatient Prescriptions    Medication Sig Dispense Refill     escitalopram (LEXAPRO) 10 MG tablet Take 10 mg by mouth daily       busPIRone (BUSPAR) 15 MG tablet Take 15 mg by mouth 2 times daily       cloNIDine (CATAPRES) 0.1 MG tablet Take 1 tablet (0.1 mg) by mouth 2 times daily (Patient not taking: Reported on 3/20/2017) 4 tablet 0     ibuprofen (ADVIL/MOTRIN) 600 MG tablet Take 1 tablet (600 mg) by mouth every 8 hours as needed for moderate pain 20 tablet 0     hydrochlorothiazide (MICROZIDE) 12.5 MG capsule Take 1 capsule (12.5 mg) by mouth daily 30 capsule 0     gabapentin (NEURONTIN) 300 MG capsule Take 2 capsules (600 mg) by mouth 4 times daily 120 capsule 0     QUEtiapine (SEROQUEL) 25 MG tablet Take 1 tablet (25 mg) by mouth 2 times daily as needed 60 tablet 0     cholecalciferol 5000 UNITS TABS Take 5,000 Units by mouth daily 60 tablet 0     norethindrone (MICRONOR) 0.35 MG per tablet Take 1 tablet (0.35 mg) by mouth daily 90 tablet 1     senna-docusate (SENOKOT-S;PERICOLACE) 8.6-50 MG per tablet Take 1-2 tablets by mouth 2 times daily 60 tablet 0     Misc. Devices (BREAST PUMP) MISC 1 each daily as needed 1 each 0     nicotine (NICODERM CQ) 21 MG/24HR 24 hr patch Place 1 patch onto the skin every 24 hours 30 patch 1     order for DME Maternity Belt order 1 each 0     doxylamine (UNISOM) 25 MG TABS Take 1 tablet (25 mg) by mouth At Bedtime 30 each 1     zolpidem (AMBIEN) 5 MG tablet Take 1 tablet (5 mg) by mouth nightly as needed for sleep 30 tablet 1     METHADONE HCL PO Take 70 mg by mouth daily        ALBUTEROL IN        Prenatal Vit-Fe Fumarate-FA (PRENATAL MULTIVITAMIN  PLUS IRON) 27-0.8 MG TABS Take 1 tablet by mouth daily       Allergies   Allergen Reactions     Seasonal Allergies Other (See Comments)     Labs reviewed in EPIC    Reviewed and updated as needed this visit by clinical staff       Reviewed and updated as needed this visit by Provider         ROS:      OBJECTIVE:                                                 "    /84  Pulse 81  Temp 98.9  F (37.2  C) (Oral)  Resp 16  Ht 5' 5\" (1.651 m)  Wt 229 lb 8 oz (104.1 kg)  LMP 06/04/2016  SpO2 96%  BMI 38.19 kg/m2  Body mass index is 38.19 kg/(m^2).     ROS:  Constitutional, HEENT, cardiovascular, pulmonary, gi and gu systems are negative, except as otherwise noted.    /84  Pulse 81  Temp 98.9  F (37.2  C) (Oral)  Resp 16  Ht 5' 5\" (1.651 m)  Wt 229 lb 8 oz (104.1 kg)  LMP 06/04/2016  SpO2 96%  BMI 38.19 kg/m2  EXAM:  GENERAL APPEARANCE: healthy, alert and no distress  EYES: Eyes grossly normal to inspection, PERRL and conjunctivae and sclerae normal  NEURO: Normal strength and tone, mentation intact and speech normal  PSYCH: mentation appears normal and affect normal/bright  MENTAL STATUS EXAM:  Appearance/Behavior: No apparent distress and Casually groomed  Speech: Normal  Mood/Affect: normal affect  Insight: Adequate      Diagnostic Test Results:  No results found for this or any previous visit (from the past 24 hour(s)).     ASSESSMENT:                                                    OPIOID DEPENDENCE, UNCOMPLICATED    PLAN:                                                    No diagnosis found.        MEDICATIONS:   No orders of the defined types were placed in this encounter.         - Continue other medications without change  FUTURE APPOINTMENTS:       - Follow-up visit in 1 - 2 WEEKS WHEN ON 30 MG METHADONE       - ADVISED CALL INTAKE; I ALSO INFORMED INTAKE THAT I WILL TAKE HER AS A PATIENT     Bola Gao MD  [facility]  "

## 2017-05-14 ENCOUNTER — TRANSFERRED RECORDS (OUTPATIENT)
Dept: HEALTH INFORMATION MANAGEMENT | Facility: HOSPITAL | Age: 22
End: 2017-05-14

## 2017-05-14 LAB
ALT SERPL-CCNC: 40 U/L (ref 0–65)
AST SERPL-CCNC: 34 U/L (ref 10–50)
CREAT SERPL-MCNC: 0.73 MG/DL (ref 0.5–0.9)
GLUCOSE SERPL-MCNC: 124 MG/DL (ref 70–100)
POTASSIUM SERPL-SCNC: 3.7 MMOL/L (ref 3.5–5)

## 2017-05-15 ENCOUNTER — HOSPITAL ENCOUNTER (INPATIENT)
Facility: HOSPITAL | Age: 22
LOS: 3 days | Discharge: HOME | DRG: 885 | End: 2017-05-18
Attending: PSYCHIATRY & NEUROLOGY | Admitting: PSYCHIATRY & NEUROLOGY
Payer: MEDICAID

## 2017-05-15 ENCOUNTER — TRANSFERRED RECORDS (OUTPATIENT)
Dept: HEALTH INFORMATION MANAGEMENT | Facility: HOSPITAL | Age: 22
End: 2017-05-15

## 2017-05-15 DIAGNOSIS — F41.9 ANXIETY: Primary | ICD-10-CM

## 2017-05-15 DIAGNOSIS — F32.2 SEVERE SINGLE CURRENT EPISODE OF MAJOR DEPRESSIVE DISORDER, WITHOUT PSYCHOTIC FEATURES (H): ICD-10-CM

## 2017-05-15 PROBLEM — F32.A DEPRESSION WITH SUICIDAL IDEATION: Status: ACTIVE | Noted: 2017-05-15

## 2017-05-15 PROBLEM — R45.851 DEPRESSION WITH SUICIDAL IDEATION: Status: ACTIVE | Noted: 2017-05-15

## 2017-05-15 PROCEDURE — 12400000 ZZH R&B MH

## 2017-05-15 PROCEDURE — 25000132 ZZH RX MED GY IP 250 OP 250 PS 637: Performed by: NURSE PRACTITIONER

## 2017-05-15 RX ORDER — ACETAMINOPHEN 325 MG/1
650 TABLET ORAL EVERY 4 HOURS PRN
Status: DISCONTINUED | OUTPATIENT
Start: 2017-05-15 | End: 2017-05-18 | Stop reason: HOSPADM

## 2017-05-15 RX ORDER — ESCITALOPRAM OXALATE 20 MG/1
20 TABLET ORAL DAILY
Status: DISCONTINUED | OUTPATIENT
Start: 2017-05-16 | End: 2017-05-18 | Stop reason: HOSPADM

## 2017-05-15 RX ORDER — ALUMINA, MAGNESIA, AND SIMETHICONE 2400; 2400; 240 MG/30ML; MG/30ML; MG/30ML
30 SUSPENSION ORAL EVERY 4 HOURS PRN
Status: DISCONTINUED | OUTPATIENT
Start: 2017-05-15 | End: 2017-05-18 | Stop reason: HOSPADM

## 2017-05-15 RX ORDER — OLANZAPINE 10 MG/2ML
10 INJECTION, POWDER, FOR SOLUTION INTRAMUSCULAR
Status: DISCONTINUED | OUTPATIENT
Start: 2017-05-15 | End: 2017-05-18 | Stop reason: HOSPADM

## 2017-05-15 RX ORDER — HYDROXYZINE HYDROCHLORIDE 25 MG/1
TABLET, FILM COATED ORAL
Status: DISPENSED
Start: 2017-05-15 | End: 2017-05-16

## 2017-05-15 RX ORDER — GABAPENTIN 100 MG/1
100 CAPSULE ORAL DAILY
Status: DISCONTINUED | OUTPATIENT
Start: 2017-05-16 | End: 2017-05-17

## 2017-05-15 RX ORDER — SULFAMETHOXAZOLE/TRIMETHOPRIM 800-160 MG
1 TABLET ORAL 2 TIMES DAILY
COMMUNITY
End: 2023-09-15

## 2017-05-15 RX ORDER — HYDROXYZINE HYDROCHLORIDE 25 MG/1
25-50 TABLET, FILM COATED ORAL EVERY 4 HOURS PRN
Status: DISCONTINUED | OUTPATIENT
Start: 2017-05-15 | End: 2017-05-18 | Stop reason: HOSPADM

## 2017-05-15 RX ORDER — METHADONE HYDROCHLORIDE 10 MG/1
40 TABLET ORAL DAILY
Status: DISCONTINUED | OUTPATIENT
Start: 2017-05-16 | End: 2017-05-18 | Stop reason: HOSPADM

## 2017-05-15 RX ORDER — OLANZAPINE 10 MG/1
10 TABLET ORAL
Status: DISCONTINUED | OUTPATIENT
Start: 2017-05-15 | End: 2017-05-18 | Stop reason: HOSPADM

## 2017-05-15 RX ORDER — SULFAMETHOXAZOLE/TRIMETHOPRIM 800-160 MG
1 TABLET ORAL 2 TIMES DAILY
Status: DISCONTINUED | OUTPATIENT
Start: 2017-05-15 | End: 2017-05-18 | Stop reason: HOSPADM

## 2017-05-15 RX ORDER — TRAZODONE HYDROCHLORIDE 50 MG/1
50 TABLET, FILM COATED ORAL
Status: DISCONTINUED | OUTPATIENT
Start: 2017-05-15 | End: 2017-05-18 | Stop reason: HOSPADM

## 2017-05-15 RX ADMIN — OLANZAPINE 10 MG: 10 TABLET, FILM COATED ORAL at 18:23

## 2017-05-15 RX ADMIN — TRAZODONE HYDROCHLORIDE 50 MG: 50 TABLET ORAL at 21:27

## 2017-05-15 RX ADMIN — NICOTINE POLACRILEX 4 MG: 4 GUM, CHEWING ORAL at 18:25

## 2017-05-15 RX ADMIN — SULFAMETHOXAZOLE AND TRIMETHOPRIM 1 TABLET: 800; 160 TABLET ORAL at 21:44

## 2017-05-15 RX ADMIN — OLANZAPINE 10 MG: 10 TABLET, FILM COATED ORAL at 12:45

## 2017-05-15 RX ADMIN — NICOTINE POLACRILEX 4 MG: 4 GUM, CHEWING ORAL at 21:27

## 2017-05-15 ASSESSMENT — ACTIVITIES OF DAILY LIVING (ADL)
TRANSFERRING: 0-->INDEPENDENT
DRESS: INDEPENDENT
DRESS: SCRUBS (BEHAVIORAL HEALTH);INDEPENDENT
TOILETING: 0-->INDEPENDENT
SWALLOWING: 0-->SWALLOWS FOODS/LIQUIDS WITHOUT DIFFICULTY
GROOMING: INDEPENDENT
GROOMING: INDEPENDENT
BATHING: 0-->INDEPENDENT
LAUNDRY: UNABLE TO COMPLETE
DRESS: 0-->INDEPENDENT
LAUNDRY: UNABLE TO COMPLETE
AMBULATION: 0-->INDEPENDENT
RETIRED_COMMUNICATION: 0-->UNDERSTANDS/COMMUNICATES WITHOUT DIFFICULTY
FALL_HISTORY_WITHIN_LAST_SIX_MONTHS: NO
ORAL_HYGIENE: INDEPENDENT
ORAL_HYGIENE: INDEPENDENT
RETIRED_EATING: 0-->INDEPENDENT
COGNITION: 0 - NO COGNITION ISSUES REPORTED

## 2017-05-15 NOTE — PROGRESS NOTES
05/15/17 1436   Patient Belongings   Did you bring any home meds/supplements to the hospital?  Yes   Disposition of meds  Sent to security/pharmacy per site process   Patient Belongings other (see comments)   Disposition of Belongings sent to cubby in patient belongings room   Belongings Search Yes   Clothing Search Yes   Second Staff Kim OAKLEY   General Info Comment 1 pair blue jeans, turquoise bra, 2 black tank tops, silver purse, 1 pack jason cigaretts, blue journal, baby wipes, dilip secret perfume gum, baby lotion,baby bib, lotion, hair brush, 1 lipstick, 1 lip shine, chap stick, 3 bic lighters, vapor cigarette,    List items sent to safe: 1 cellphone in case, $1 in cellphone case, pink coin purse with   $5.50, [employer] debit mastercard, EBT card, [employer] One visa card, [employer] visa debit card, [employer] Debit visa card, 2 Net Spend visa cards, $25 gift card, [place] drivers license, misc. cards      All other belongings put in assigned cubby in belongings room.     I have reviewed my belongings list on admission and verify that it is correct.     Patient signature_______________________________    Second staff witness (if patient unable to sign) ______________________________       I have received all my belongings at discharge.    Patient signature________________________________    Nicolasa   5/15/2017  2:45 PM

## 2017-05-15 NOTE — IP AVS SNAPSHOT
MRN:[id number]                      After Visit Summary   5/15/2017    Ileana Thorne    MRN: [id number]           Thank you!     Thank you for choosing [facility] for your care. Our goal is always to provide you with excellent care. Hearing back from our patients is one way we can continue to improve our services. Please take a few minutes to complete the written survey that you may receive in the mail after you visit with us. Thank you!        Patient Information     Date Of Birth          1995        Designated Caregiver       Most Recent Value    Caregiver    Will someone help with your care after discharge? no      About your hospital stay     You were admitted on:  May 15, 2017 You last received care in the:  [facility]    You were discharged on:  May 17, 2017       Who to Call     For medical emergencies, please call 911.  For non-urgent questions about your medical care, please call your primary care provider or clinic, None          Attending Provider     Provider Specialty    Christo Herndon MD Psychiatry    Jagruti Barry NP Licensed Mental Health       Primary Care Provider    Physician No Ref-Primary       No address on file        Further instructions from your care team       Behavioral Discharge Planning and Instructions    Summary: Ileana was admitted for suicidal ideation.    Main Diagnosis: substance induced mood disorder vs depressed mood with mixed features, Post partum depression, Heroin abuse disorder-severe, Cocaine abuse disorder-severe.    Major Treatments, Procedures and Findings: Stabilize with medications, connect with community programs.     Symptoms to Report: feeling more aggressive, increased confusion, losing more sleep, mood getting worse or thoughts of suicide    Lifestyle Adjustment: Take all medications as prescribed, meet with doctor/ medication provider, out patient therapist, , and [facility] worker as scheduled. Abstain from alcohol or  any unprescribed drugs.     Psychiatry Follow-up:    [facility]- Medication Management- Bib Thibodeaux- Thursday, June 1st @ 11:30 am  [address] [place] [address]  Phone: [phone]  Fax: [phone]      [facility] [place] - Call to schedule  Individual Therapy -  Medication Management -   [address] [facility], [address]  [place] [address] [phone]     [facility] : Child Protection : Gricelda Mcconnell  [address], [address]  [place] [address]  Phone: [phone]  Fax: [phone]      Resources:   Crisis Intervention: 585.343.6022 or 978-136-6505 (TTY: 731.825.8921).  Call anytime for help.  National [place]a on Mental Illness (www.[place].michi.org): 109.687.3137 or 798-209-1662.  Alcoholics Anonymous (www.alcoholics-anonymous.org): Check your phone book for your local chapter.  Suicide Awareness Voices of Education (SAVE) (www.save.org): 221-381-MFNY (1917)  National Suicide Prevention Line (www.mentalhealthmn.org): 684-062-SJRH (1773)  Mental Health Consumer/Survivor Network of [place] (www.mhcsn.net): 507.268.3500 or 553-512-4184  Mental Health Association of [place] (www.mentalhealth.org): 353.568.9683 or 963-435-7140    General Medication Instructions:   See your medication sheet(s) for instructions.   Take all medicines as directed.  Make no changes unless your doctor suggests them.   Go to all your doctor visits.  Be sure to have all your required lab tests. This way, your medicines can be refilled on time.  Do not use any drugs not prescribed by your doctor.  Avoid alcohol.    [place] Area:  [facility], [facility] stabilization \A Chronology of [place] Hospitals\""- [phone]  [facility] Crisis Line: 1-664.685.3190  Advocates For Family Peace: 154-4748  Sexual Assault Program [facility]: 651.605.9333 or 1-218.601.2962  [place] Battered Women's Program: 2-426-580-6446 Ext: 279       Calls answered Mon-Fri-8:00 am--4:30 pm    [place]:  Advocates for Family Peace: 9-673-579-2964  [facility] first call for help: 1-954.160.5116  [facility] Crisis [facility]:  (216) 328-7934      [place] Area:  Warm Line: 1-895.385.8590       Calls answered Tuesday--Saturday 4:00 pm--10:00 pm  [facility] Crisis Line - 195.504.7717  [place] [facility] Stabilization 845-171-9053    [place] Statewide:  [place] Crisis and Referral Services: 1-740.407.9001  National Suicide Prevention Lifeline: 3-020-871-TALK (9329)   - svh1jooe- Text  Life  to 64[address]  First Call for Help:   [facility] Helpline- 1-841-DQAM-HELP     Pending Results     No orders found from 2017 to 2017.            Statement of Approval     Ordered          17 1506  I have reviewed and agree with all the recommendations and orders detailed in this document.  EFFECTIVE NOW     Approved and electronically signed by:  Delbert Padron NP             Admission Information     Date & Time Provider Department Dept. Phone    5/15/2017 Jagruti Barry NP [facility] [phone]      Your Vitals Were     Blood Pressure Pulse Temperature Respirations Height Weight    138/81 59 98  F (36.7  C) (Tympanic) 14 1.651 m (5' 5\") 103.1 kg (227 lb 6.4 oz)    Pulse Oximetry BMI (Body Mass Index)                98% 37.84 kg/m2          ReplySend Information     ReplySend lets you send messages to your doctor, view your test results, renew your prescriptions, schedule appointments and more. To sign up, go to [email or web address] . Click on \"Log in\" on the left side of the screen, which will take you to the Welcome page. Then click on \"Sign up Now\" on the right side of the page.     You will be asked to enter the access code listed below, as well as some personal information. Please follow the directions to create your username and password.     Your access code is: [email or web address]  Expires: 7/10/2017  2:07 PM     Your access code will  in 90 days. If you need help or a new code, please call " your [place] clinic or [phone].        Care EveryWhere ID     This is your Care EveryWhere ID. This could be used by other organizations to access your [place] medical records  [id number]           Review of your medicines      CONTINUE these medicines which may have CHANGED, or have new prescriptions. If we are uncertain of the size of tablets/capsules you have at home, strength may be listed as something that might have changed.        Dose / Directions    escitalopram 20 MG tablet   Commonly known as:  LEXAPRO   This may have changed:  medication strength   Used for:  Severe single current episode of major depressive disorder, without psychotic features (H)        Dose:  20 mg   Take 1 tablet (20 mg) by mouth daily   Quantity:  30 tablet   Refills:  0       gabapentin 300 MG capsule   Commonly known as:  NEURONTIN   This may have changed:    - medication strength  - how much to take  - when to take this  - additional instructions   Used for:  Anxiety        Dose:  300 mg   Take 1 capsule (300 mg) by mouth 3 times daily   Quantity:  90 capsule   Refills:  0         CONTINUE these medicines which have NOT CHANGED        Dose / Directions    METHADONE HCL PO        Dose:  42 mg   Take 42 mg by mouth   Refills:  0       sulfamethoxazole-trimethoprim 800-160 MG per tablet   Commonly known as:  BACTRIM DS/SEPTRA DS        Dose:  1 tablet   Take 1 tablet by mouth 2 times daily   Refills:  0            Where to get your medicines      These medications were sent to [facility] - [address]  [address] [address]     Phone:  [phone]     escitalopram 20 MG tablet    gabapentin 300 MG capsule                Protect others around you: Learn how to safely use, store and throw away your medicines at www.disposemymeds.org.             Medication List: This is a list of all your medications and when to take them. Check marks below indicate your daily home schedule. Keep  this list as a reference.      Medications           Morning Afternoon Evening Bedtime As Needed    escitalopram 20 MG tablet   Commonly known as:  LEXAPRO   Take 1 tablet (20 mg) by mouth daily   Last time this was given:  20 mg on 5/17/2017  8:13 AM                                gabapentin 300 MG capsule   Commonly known as:  NEURONTIN   Take 1 capsule (300 mg) by mouth 3 times daily   Last time this was given:  100 mg on 5/17/2017  8:13 AM                                METHADONE HCL PO   Take 42 mg by mouth   Last time this was given:  40 mg on 5/17/2017  8:13 AM                                sulfamethoxazole-trimethoprim 800-160 MG per tablet   Commonly known as:  BACTRIM DS/SEPTRA DS   Take 1 tablet by mouth 2 times daily   Last time this was given:  1 tablet on 5/17/2017  8:13 AM

## 2017-05-15 NOTE — PLAN OF CARE
Face to face end of shift report received from Capri MCWILLIAMS RN. Rounding completed. Patient observed in room sleeping, respirations even and unlabored.     Chio Michelle  5/15/2017  5:06 PM

## 2017-05-15 NOTE — IP AVS SNAPSHOT
[facility]    [address] [place] [address]    Phone:  [phone]    Fax:  [phone]                                       After Visit Summary   5/15/2017    Ileana Thorne    MRN: [id number]           After Visit Summary Signature Page     I have received my discharge instructions, and my questions have been answered. I have discussed any challenges I see with this plan with the nurse or doctor.    ..........................................................................................................................................  Patient/Patient Representative Signature      ..........................................................................................................................................  Patient Representative Print Name and Relationship to Patient    ..................................................               ................................................  Date                                            Time    ..........................................................................................................................................  Reviewed by Signature/Title    ...................................................              ..............................................  Date                                                            Time
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
## 2017-05-16 ENCOUNTER — APPOINTMENT (OUTPATIENT)
Dept: OCCUPATIONAL MEDICINE | Facility: OTHER | Age: 22
End: 2017-05-16

## 2017-05-16 PROCEDURE — 12400000 ZZH R&B MH

## 2017-05-16 PROCEDURE — 25000132 ZZH RX MED GY IP 250 OP 250 PS 637: Performed by: NURSE PRACTITIONER

## 2017-05-16 PROCEDURE — 99223 1ST HOSP IP/OBS HIGH 75: CPT | Performed by: NURSE PRACTITIONER

## 2017-05-16 RX ORDER — HALOPERIDOL 10 MG/1
10 TABLET ORAL EVERY 6 HOURS PRN
Status: DISCONTINUED | OUTPATIENT
Start: 2017-05-16 | End: 2017-05-18 | Stop reason: HOSPADM

## 2017-05-16 RX ORDER — NICOTINE 21 MG/24HR
1 PATCH, TRANSDERMAL 24 HOURS TRANSDERMAL DAILY
Status: DISCONTINUED | OUTPATIENT
Start: 2017-05-16 | End: 2017-05-18 | Stop reason: HOSPADM

## 2017-05-16 RX ORDER — DIPHENHYDRAMINE HCL 50 MG
50 CAPSULE ORAL EVERY 6 HOURS PRN
Status: DISCONTINUED | OUTPATIENT
Start: 2017-05-16 | End: 2017-05-18 | Stop reason: HOSPADM

## 2017-05-16 RX ADMIN — TRAZODONE HYDROCHLORIDE 50 MG: 50 TABLET ORAL at 20:11

## 2017-05-16 RX ADMIN — METHADONE HYDROCHLORIDE 40 MG: 10 TABLET ORAL at 09:07

## 2017-05-16 RX ADMIN — NICOTINE POLACRILEX 4 MG: 4 GUM, CHEWING ORAL at 15:24

## 2017-05-16 RX ADMIN — ESCITALOPRAM 20 MG: 20 TABLET, FILM COATED ORAL at 09:07

## 2017-05-16 RX ADMIN — SULFAMETHOXAZOLE AND TRIMETHOPRIM 1 TABLET: 800; 160 TABLET ORAL at 09:07

## 2017-05-16 RX ADMIN — SULFAMETHOXAZOLE AND TRIMETHOPRIM 1 TABLET: 800; 160 TABLET ORAL at 20:11

## 2017-05-16 RX ADMIN — NICOTINE 1 PATCH: 21 PATCH, EXTENDED RELEASE TRANSDERMAL at 18:54

## 2017-05-16 RX ADMIN — HALOPERIDOL 10 MG: 10 TABLET ORAL at 17:08

## 2017-05-16 RX ADMIN — OLANZAPINE 10 MG: 10 TABLET, FILM COATED ORAL at 14:54

## 2017-05-16 RX ADMIN — GABAPENTIN 100 MG: 100 CAPSULE ORAL at 09:07

## 2017-05-16 NOTE — PHARMACY
[facility]    Pharmacy      Antimicrobial Stewardship Note     Current antimicrobial therapy:  Anti-infectives (Future)    Start     Dose/Rate Route Frequency Ordered Stop    05/15/17 2130  sulfamethoxazole-trimethoprim (BACTRIM DS/SEPTRA DS) 800-160 MG per tablet 1 tablet      1 tablet Oral 2 TIMES DAILY 05/15/17 2125            Indication: UTI     Pertinent labs:  Creatinine   Creatinine   Date Value Ref Range Status   03/03/2017 0.83 0.52 - 1.04 mg/dL Final   02/18/2017 0.64 0.52 - 1.04 mg/dL Final   12/29/2016 0.59 0.52 - 1.04 mg/dL Final     WBC   WBC   Date Value Ref Range Status   03/03/2017 8.3 4.0 - 11.0 10e9/L Final   02/18/2017 11.7 (H) 4.0 - 11.0 10e9/L Final   01/10/2017 11.4 (H) 4.0 - 11.0 10e9/L Final     ANC No components found for: ANC     Culture Results: No cultures are available     Recommendations/Interventions:  1. No recommendations at this time      Sarah Connor, [facility]  May 16, 2017
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
## 2017-05-16 NOTE — PLAN OF CARE
Face to face end of shift report received from Sun San RN. Rounding completed. Patient observed in bed asleep.    Melvi Valentin  5/16/2017  8:04 AM

## 2017-05-16 NOTE — PROGRESS NOTES
Face to face end of shift report received from edna dexter at 2300 report.. Rounding completed. Patient observed.     Angi San  5/16/2017  1:31 AM

## 2017-05-16 NOTE — PLAN OF CARE
Problem: Goal Outcome Summary  Goal: Goal Outcome Summary  0015 in bed with easy respirations presenting sleeping. 0509 continues to sleep this shift.

## 2017-05-16 NOTE — PLAN OF CARE
Face to face end of shift report received from Melvi Soto. Rounding completed. Patient observed in [facility].     Annika Faria  5/16/2017  3:41 PM

## 2017-05-16 NOTE — PLAN OF CARE
Problem: Goal Outcome Summary  Goal: Goal Outcome Summary  Pt has been very sleepy this shift and isolative to room. She denies SI, HI, hallucinations or pain. Pt endorses depression and anxiety 10/10. Pt is asking for Methadone many times this shift.   1830-administered [id number] mg po for anxiety/agitation. Will continue to monitor.    Problem: Suicide Risk (Adult)  Goal: Strength-Based Wellness/Recovery  Patient will identify two coping skills daily   Pt is unable to identify any coping skills at this time.  Goal: Physical Safety  Patient will deny suicidal thoughts by discharge.   Pt denies SI at this time. She does say that she still has occasional thoughts but contract for safety.
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
## 2017-05-16 NOTE — PLAN OF CARE
Problem: Discharge Planning  Goal: Discharge Planning (Adult, OB, Behavioral, Peds)  Writer received request from [facility] Child Protection Worker Gricelda Deshpande [phone]- requesting a Utox report and hair follicle from pt and also to have pt sign a BARBARA for hospital to share information with them.  Writer informed them that we would need a court order for a Utox or hair follicle but she could send the BARBARA- Pt signed the BARBARA and writer sent back to [facility].    3:00pm:  came and served pt with court paperwork that her youngest child had been taken into custody because of child having high medical needs and being left with an inadequate caregiver and because of pt's admission to cocaine use and mental health status.    After being served the paperwork pt was observed on the phone and crying.

## 2017-05-16 NOTE — PLAN OF CARE
Problem: Goal Outcome Summary  Goal: Goal Outcome Summary  Sad affect and tearful am. Patient states her children are being taken from her. Irritable. Denies SI, anxiety, hallucinations and pain. Out in lounge area and watching tv but did not interact with peers. Did not attend groups today.  Patient just was served papers from the [employer] dept regarding her children being taken from her. She requested and was given Zyprexa 10 mg oral at 1454 for anxiety. She does request that her practitioner prescribe something that works better.  Problem: Suicide Risk (Adult)  Goal: Strength-Based Wellness/Recovery  Patient will identify two coping skills daily   Patient did not identify any coping skills.  Goal: Physical Safety  Patient will deny suicidal thoughts by discharge.   Patient did not endorse any suicidial thinking.

## 2017-05-17 PROCEDURE — 25000132 ZZH RX MED GY IP 250 OP 250 PS 637: Performed by: NURSE PRACTITIONER

## 2017-05-17 PROCEDURE — 99239 HOSP IP/OBS DSCHRG MGMT >30: CPT | Performed by: NURSE PRACTITIONER

## 2017-05-17 PROCEDURE — 12400000 ZZH R&B MH

## 2017-05-17 RX ORDER — GABAPENTIN 300 MG/1
300 CAPSULE ORAL 3 TIMES DAILY
Status: DISCONTINUED | OUTPATIENT
Start: 2017-05-17 | End: 2017-05-18 | Stop reason: HOSPADM

## 2017-05-17 RX ORDER — ESCITALOPRAM OXALATE 20 MG/1
20 TABLET ORAL DAILY
Qty: 30 TABLET | Refills: 0 | Status: SHIPPED | OUTPATIENT
Start: 2017-05-17 | End: 2023-10-04

## 2017-05-17 RX ORDER — GABAPENTIN 300 MG/1
300 CAPSULE ORAL 3 TIMES DAILY
Qty: 90 CAPSULE | Refills: 0 | Status: SHIPPED | OUTPATIENT
Start: 2017-05-17 | End: 2023-11-06

## 2017-05-17 RX ADMIN — HALOPERIDOL 10 MG: 10 TABLET ORAL at 08:55

## 2017-05-17 RX ADMIN — SULFAMETHOXAZOLE AND TRIMETHOPRIM 1 TABLET: 800; 160 TABLET ORAL at 08:13

## 2017-05-17 RX ADMIN — METHADONE HYDROCHLORIDE 40 MG: 10 TABLET ORAL at 08:13

## 2017-05-17 RX ADMIN — SULFAMETHOXAZOLE AND TRIMETHOPRIM 1 TABLET: 800; 160 TABLET ORAL at 20:21

## 2017-05-17 RX ADMIN — TRAZODONE HYDROCHLORIDE 50 MG: 50 TABLET ORAL at 20:24

## 2017-05-17 RX ADMIN — GABAPENTIN 300 MG: 300 CAPSULE ORAL at 20:21

## 2017-05-17 RX ADMIN — GABAPENTIN 100 MG: 100 CAPSULE ORAL at 08:13

## 2017-05-17 RX ADMIN — NICOTINE 1 PATCH: 21 PATCH, EXTENDED RELEASE TRANSDERMAL at 08:14

## 2017-05-17 RX ADMIN — GABAPENTIN 300 MG: 300 CAPSULE ORAL at 16:31

## 2017-05-17 RX ADMIN — HALOPERIDOL 10 MG: 10 TABLET ORAL at 14:29

## 2017-05-17 RX ADMIN — ESCITALOPRAM 20 MG: 20 TABLET, FILM COATED ORAL at 08:13

## 2017-05-17 ASSESSMENT — ACTIVITIES OF DAILY LIVING (ADL)
ORAL_HYGIENE: INDEPENDENT
GROOMING: INDEPENDENT
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
## 2017-05-17 NOTE — PLAN OF CARE
Problem: Goal Outcome Summary  Goal: Goal Outcome Summary  0015 in bed with easy respirations, presenting sleeping. 0513 patient continues to sleep this shift.

## 2017-05-17 NOTE — PLAN OF CARE
Face to face end of shift report received from Corinne Soto . Rounding completed. Patient observed in [facility] [place] [place].     Annika Faria  5/17/2017  3:49 PM

## 2017-05-17 NOTE — PLAN OF CARE
Face to face end of shift report received from Angi ARRIAZA RN. Rounding completed. Patient observed.     Corinne Parker  5/17/2017  7:47 AM

## 2017-05-17 NOTE — PROGRESS NOTES
Face to face end of shift report received from tanisha dexter at 2300 report.. Rounding completed. Patient observed.     Angi San  5/17/2017  12:48 AM

## 2017-05-17 NOTE — PLAN OF CARE
"Problem: Discharge Planning  Goal: Discharge Planning (Adult, OB, Behavioral, Peds)  Writer received a call from [facility] Child [facility] Zyyuhs-[phone] who stated there is a [facility] court hearing on Friday at 10:30 am in [facility].  She wanted to know if pt would still be in the hospital at that time- or would she be discharged by then?       Writer spoke with pt who stated she wanted to get out by then and she denies any suicidal ideation.  Pt stated she would \"need something for anxiety\" and to ask the practitioner to prescribe something- writer explained that our practitioner would not prescribe clonzepam if that is what she was asking for- pt stated, \"Then what am I supposed to do about my anxiety?\" Writer again explained that we do not typically give benzodiazapine's here.  Writer let her know that we could get her a bus ticket for tomorrow morning so she could get home in time for her court hearing- pt stated that was what she wanted to do.     Writer left a voicemail for Gricelda and let her know that pt was discharging home in the morning.       "

## 2017-05-17 NOTE — DISCHARGE INSTRUCTIONS
Behavioral Discharge Planning and Instructions    Summary: Ileana was admitted for suicidal ideation.    Main Diagnosis: substance induced mood disorder vs depressed mood with mixed features, Post partum depression, Heroin abuse disorder-severe, Cocaine abuse disorder-severe.    Major Treatments, Procedures and Findings: Stabilize with medications, connect with community programs.     Symptoms to Report: feeling more aggressive, increased confusion, losing more sleep, mood getting worse or thoughts of suicide    Lifestyle Adjustment: Take all medications as prescribed, meet with doctor/ medication provider, out patient therapist, , and [facility] worker as scheduled. Abstain from alcohol or any unprescribed drugs.     Psychiatry Follow-up:    [facility]- Medication Management- Bibpriscila Dawsonrudy- Thursday, June 1st @ 11:30 am  [address] [place] [address]  Phone: [phone]  Fax: [phone]      [facility] [place] - Call to schedule  Individual Therapy -  Medication Management -   [address] [place] [address], [address]  [place] [address] [phone]     [facility] : Child Protection : Gricelda Mcconnell  [address] [place] [address], [address]  [place] [address]  Phone: [phone]  Fax: [phone]      Resources:   Crisis Intervention: 189.978.8242 or 534-228-7560 (TTY: 265.230.2688).  Call anytime for help.  National [place] on Mental Illness (www.[place].michi.org): 211.724.9630 or 435-168-0282.  Alcoholics Anonymous (www.alcoholics-anonymous.org): Check your phone book for your local chapter.  Suicide Awareness Voices of Education (SAVE) (www.save.org): 648-344-JBEK (4257)  National Suicide Prevention Line (www.mentalhealthmn.org): 592-822-DPQD (0911)  Mental Health Consumer/Survivor Network of [place] (www.mhcsn.net): 800.604.1834 or 572-502-1508  Mental Health Association of [place] (www.mentalhealth.org): 805.389.5656 or 801-235-5252    General  Medication Instructions:   See your medication sheet(s) for instructions.   Take all medicines as directed.  Make no changes unless your doctor suggests them.   Go to all your doctor visits.  Be sure to have all your required lab tests. This way, your medicines can be refilled on time.  Do not use any drugs not prescribed by your doctor.  Avoid alcohol.    [place] Area:  [facility], Crisis stabilization [facility]- [phone]  Critical access hospital Crisis Line: 1-335.663.5688  [facility]ates For Family Peace: 315-5674  Sexual Assault Program [facility]: 884.738.5495 or 1-707.206.5657  [place] Battered Women's Program: 1-875.924.5042 Ext: 279       Calls answered Mon-Fri-8:00 am--4:30 pm    [place]:  [facility] for Family Peace: 1-449.858.1578  [facility] first call for help: 2-461-126-4282  [facility]:  (462) [phone]      [place] Area:  Warm Line: 1-670.449.3833       Calls answered Tuesday--Saturday 4:00 pm--10:00 pm  [facility] Crisis Line - [phone]  [place] Crisis Stabilization 105-595-1564    [place] Statewide:  [place] Crisis and Referral Services: 1-956.485.7773  National Suicide Prevention Lifeline: 8-946-577-TALK (8382)   - doc2lmmn- Text  Life  to 515[address]  First Call for Help: 2-1-1  CHARISMA Helpline- 9-214-LOJV-HELP

## 2017-05-17 NOTE — PHARMACY
[facility]    Pharmacy      Antimicrobial Stewardship Note     Current antimicrobial therapy:  Anti-infectives (Future)    Start     Dose/Rate Route Frequency Ordered Stop    05/15/17 2130  sulfamethoxazole-trimethoprim (BACTRIM DS/SEPTRA DS) 800-160 MG per tablet 1 tablet      1 tablet Oral 2 TIMES DAILY 05/15/17 2125            Indication: Urinary tract infection (Bactrim DS)     Pertinent labs:  Creatinine   Creatinine   Date Value Ref [place] Status   03/03/2017 0.83 0.52 - 1.04 mg/dL Final   02/18/2017 0.64 0.52 - 1.04 mg/dL Final   12/29/2016 0.59 0.52 - 1.04 mg/dL Final     WBC   WBC   Date Value Ref [place] Status   03/03/2017 8.3 4.0 - 11.0 10e9/L Final   02/18/2017 11.7 (H) 4.0 - 11.0 10e9/L Final   01/10/2017 11.4 (H) 4.0 - 11.0 10e9/L Final     ANC No components found for: ANC     Culture Results: No cultures performed.     Recommendations/Interventions:  1. No recommendations at this time.    Gomez Celeste  May 17, 2017

## 2017-05-17 NOTE — PROGRESS NOTES
RADHA LAM    Patient requested to see the  during rounds.   Conversation began with her upcoming court proceeding concerning custody of her children.  She noted her anxiety....also the fact that she was not receiving the meds she wanted.   Patient abruptly decided to leave saying she just had to do something.....later saw she was on the telephone.  My impression is that she did not hear the  being supportive of her or otherwise became overwhelmed with anxiety. Not sure ..........as she is leaving tomorrow it appears feels that she is not being helped here.
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
## 2017-05-17 NOTE — PLAN OF CARE
"Problem: Goal Outcome Summary  Goal: Goal Outcome Summary  Pt denies SI HI halluciantions at this time.  Pt admits to depression and \"high\" anxiety.  Pt observed on phone crying.  She inquired about her gabapentin dose.  Pt is discharging in the AM on the 0730 bus.  Early Breakfast and bag lunch ordered. Writer contacted [facility] pharmacy to inquire on the medications prescribed.  They will send the medication to the unit once they run the insurance.  Pt is encouraged to participate in unit programming.  She was administered her gabapentin that was scheduled for 1445.     Writer attempted to get home medications sent up from pharmacy, spoke to Lacy and she states that they  Are their at 0630 and to call then so they will send them up.         Problem: Suicide Risk (Adult)  Goal: Strength-Based Wellness/Recovery  Patient will identify two coping skills daily   Outcome: Therapy, progress towards functional goals is fair  Pt likes music and relaxation as coping skills.,  Goal: Physical Safety  Patient will deny suicidal thoughts by discharge.   Pt denies SI at this time      "

## 2017-05-17 NOTE — PLAN OF CARE
Problem: Goal Outcome Summary  Goal: Goal Outcome Summary  Pt. Has been up and about on unit, states the depression and anxiety are very high at this time, denies SI and hallucinations, complains of racing thoughts, requested/received haldol 10 mg po at 0855, appetite fair, spending most of time in dayroom, talking on phone frequently, taking prescribed medications, encouraged to attend group therapy, will continue to monitor.    Problem: Suicide Risk (Adult)  Goal: Strength-Based Wellness/Recovery  Patient will identify two coping skills daily   Outcome: Therapy, progress toward functional goals is gradual  Encouraged to attend group therapy to learn new coping skills  Goal: Physical Safety  Patient will deny suicidal thoughts by discharge.   Outcome: Therapy, progress toward functional goals is gradual  Pt. Denies SI at this time

## 2017-05-18 VITALS
WEIGHT: 227.4 LBS | HEART RATE: 72 BPM | RESPIRATION RATE: 14 BRPM | TEMPERATURE: 96.1 F | HEIGHT: 65 IN | BODY MASS INDEX: 37.89 KG/M2 | DIASTOLIC BLOOD PRESSURE: 90 MMHG | SYSTOLIC BLOOD PRESSURE: 147 MMHG | OXYGEN SATURATION: 96 %

## 2017-05-18 PROCEDURE — 25000132 ZZH RX MED GY IP 250 OP 250 PS 637: Performed by: NURSE PRACTITIONER

## 2017-05-18 RX ADMIN — ALUMINUM HYDROXIDE, MAGNESIUM HYDROXIDE, AND DIMETHICONE 30 ML: 400; 400; 40 SUSPENSION ORAL at 06:48

## 2017-05-18 RX ADMIN — METHADONE HYDROCHLORIDE 40 MG: 10 TABLET ORAL at 07:24

## 2017-05-18 RX ADMIN — SULFAMETHOXAZOLE AND TRIMETHOPRIM 1 TABLET: 800; 160 TABLET ORAL at 07:24

## 2017-05-18 RX ADMIN — NICOTINE 1 PATCH: 21 PATCH, EXTENDED RELEASE TRANSDERMAL at 07:23

## 2017-05-18 RX ADMIN — ESCITALOPRAM 20 MG: 20 TABLET, FILM COATED ORAL at 07:25

## 2017-05-18 RX ADMIN — GABAPENTIN 300 MG: 300 CAPSULE ORAL at 07:24

## 2017-05-18 NOTE — PLAN OF CARE
Problem: Goal Outcome Summary  Goal: Goal Outcome Summary  Pt has been in bed with eyes closed and regular respirations observed all night. Patient up at 0600. This writer went over discharge paperwork, provided survey, and provided shower supplies to shower with. Will continue to monitor.

## 2017-05-18 NOTE — PLAN OF CARE
Discharge Note    [facility]2017 7:30 AM via bus accompanied by  and security staff.     Patient informed of discharge instructions in AVS. patient verbalizes understanding and denies having any questions pertaining to AVS. Patient stable at time of discharge. Patient denies SI, HI, and thoughts of self harm at time of discharge. All personal belongings returned to patient. Discharge prescriptions sent to [facility] via electronic communication. Psych evaluation, history and physical, AVS, and discharge summary faxed to next level of care- by .     Clementine Cerda  5/18/2017  7:30 AM

## 2017-05-18 NOTE — PLAN OF CARE
Problem: Discharge Planning  Goal: Discharge Planning (Adult, OB, Behavioral, Peds)  Pt is discharging at the recommendation of the treatment team. Pt is discharging to [facility] transported by [facility]. Pt denies having any thoughts of hurting themself or anyone else. Pt denies anxiety or depression. Pt has follow up with Mel Yousif. Discharge instructions, including; demographic sheet, psychiatric evaluation, discharge summary, and AVS were faxed to these next level of care providers.

## 2017-05-18 NOTE — PLAN OF CARE
Face to face end of shift report received from Annika DIAZ RN. Rounding completed. Patient observed in bed, appears asleep, respirations observed.     Capri Jeronimo  5/18/2017  1:08 AM

## 2017-05-18 NOTE — PLAN OF CARE
Face to face end of shift report received from Capri MCWILLIAMS RN. Rounding completed. Patient observed.     Clementine Cerda  5/18/2017  7:25 AM

## 2023-03-04 LAB — HIV 1&2 EXT: NORMAL

## 2023-04-26 ENCOUNTER — TRANSFERRED RECORDS (OUTPATIENT)
Dept: MULTI SPECIALTY CLINIC | Facility: CLINIC | Age: 28
End: 2023-04-26

## 2023-04-26 LAB
HEP C HIM: NORMAL
PAP-ABSTRACT: NORMAL
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
## 2023-08-25 ENCOUNTER — OFFICE VISIT (OUTPATIENT)
Dept: FAMILY MEDICINE | Facility: CLINIC | Age: 28
End: 2023-08-25
Payer: COMMERCIAL

## 2023-08-25 VITALS
DIASTOLIC BLOOD PRESSURE: 88 MMHG | BODY MASS INDEX: 36.99 KG/M2 | OXYGEN SATURATION: 95 % | HEIGHT: 65 IN | WEIGHT: 222 LBS | TEMPERATURE: 98.2 F | SYSTOLIC BLOOD PRESSURE: 122 MMHG | RESPIRATION RATE: 17 BRPM | HEART RATE: 76 BPM

## 2023-08-25 DIAGNOSIS — F51.4 NIGHT TERRORS: ICD-10-CM

## 2023-08-25 DIAGNOSIS — Z30.012 ENCOUNTER FOR EMERGENCY CONTRACEPTION: ICD-10-CM

## 2023-08-25 DIAGNOSIS — F41.9 ANXIETY: ICD-10-CM

## 2023-08-25 DIAGNOSIS — M54.2 CERVICALGIA: ICD-10-CM

## 2023-08-25 DIAGNOSIS — Z00.00 ENCOUNTER FOR MEDICAL EXAMINATION TO ESTABLISH CARE: Primary | ICD-10-CM

## 2023-08-25 DIAGNOSIS — Z86.711 HISTORY OF PULMONARY EMBOLISM: ICD-10-CM

## 2023-08-25 DIAGNOSIS — G89.29 OTHER CHRONIC PAIN: ICD-10-CM

## 2023-08-25 PROCEDURE — 96127 BRIEF EMOTIONAL/BEHAV ASSMT: CPT

## 2023-08-25 PROCEDURE — 99204 OFFICE O/P NEW MOD 45 MIN: CPT

## 2023-08-25 RX ORDER — LEVONORGESTREL 1.5 MG/1
1.5 TABLET ORAL ONCE
Qty: 1 TABLET | Refills: 0 | Status: SHIPPED | OUTPATIENT
Start: 2023-08-25 | End: 2023-10-20

## 2023-08-25 RX ORDER — CYCLOBENZAPRINE HCL 10 MG
10 TABLET ORAL EVERY 8 HOURS PRN
Qty: 120 TABLET | Refills: 1 | Status: SHIPPED | OUTPATIENT
Start: 2023-08-25 | End: 2023-11-08

## 2023-08-25 ASSESSMENT — ANXIETY QUESTIONNAIRES
IF YOU CHECKED OFF ANY PROBLEMS ON THIS QUESTIONNAIRE, HOW DIFFICULT HAVE THESE PROBLEMS MADE IT FOR YOU TO DO YOUR WORK, TAKE CARE OF THINGS AT HOME, OR GET ALONG WITH OTHER PEOPLE: VERY DIFFICULT
GAD7 TOTAL SCORE: 20
2. NOT BEING ABLE TO STOP OR CONTROL WORRYING: NEARLY EVERY DAY
7. FEELING AFRAID AS IF SOMETHING AWFUL MIGHT HAPPEN: NEARLY EVERY DAY
5. BEING SO RESTLESS THAT IT IS HARD TO SIT STILL: NEARLY EVERY DAY
3. WORRYING TOO MUCH ABOUT DIFFERENT THINGS: NEARLY EVERY DAY
6. BECOMING EASILY ANNOYED OR IRRITABLE: NEARLY EVERY DAY
GAD7 TOTAL SCORE: 20
1. FEELING NERVOUS, ANXIOUS, OR ON EDGE: MORE THAN HALF THE DAYS
4. TROUBLE RELAXING: NEARLY EVERY DAY
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
## 2023-08-26 PROBLEM — F41.9 ANXIETY: Status: ACTIVE | Noted: 2023-08-26

## 2023-08-26 PROBLEM — F51.4 NIGHT TERRORS: Status: ACTIVE | Noted: 2023-08-26

## 2023-08-26 RX ORDER — PREGABALIN 100 MG/1
100 CAPSULE ORAL 3 TIMES DAILY
COMMUNITY
End: 2023-10-25

## 2023-08-26 RX ORDER — BENZTROPINE MESYLATE 0.5 MG/1
0.5 TABLET ORAL AT BEDTIME
COMMUNITY
End: 2023-12-07

## 2023-08-26 RX ORDER — PRAZOSIN HYDROCHLORIDE 2 MG/1
3 CAPSULE ORAL
COMMUNITY
End: 2024-02-26

## 2023-08-26 RX ORDER — CLINDAMYCIN HCL 300 MG
300 CAPSULE ORAL 4 TIMES DAILY
COMMUNITY
End: 2023-12-04

## 2023-08-26 RX ORDER — ARIPIPRAZOLE 10 MG/1
10 TABLET ORAL AT BEDTIME
COMMUNITY
End: 2023-09-15

## 2023-08-26 RX ORDER — PHENOL 1.4 %
15-20 AEROSOL, SPRAY (ML) MUCOUS MEMBRANE
COMMUNITY
End: 2023-12-04

## 2023-08-26 RX ORDER — BUSPIRONE HYDROCHLORIDE 15 MG/1
15 TABLET ORAL 3 TIMES DAILY
COMMUNITY
End: 2023-09-15

## 2023-08-26 RX ORDER — DESIPRAMINE HYDROCHLORIDE 150 MG/1
150 TABLET ORAL AT BEDTIME
COMMUNITY
End: 2023-09-15

## 2023-08-27 PROBLEM — G89.29 OTHER CHRONIC PAIN: Status: ACTIVE | Noted: 2023-08-27

## 2023-08-27 PROBLEM — Z86.711 HISTORY OF PULMONARY EMBOLISM: Status: ACTIVE | Noted: 2023-08-27

## 2023-08-27 PROBLEM — M54.2 CERVICALGIA: Status: ACTIVE | Noted: 2023-08-27

## 2023-08-31 ENCOUNTER — APPOINTMENT (OUTPATIENT)
Dept: GENERAL RADIOLOGY | Facility: CLINIC | Age: 28
End: 2023-08-31
Attending: EMERGENCY MEDICINE
Payer: COMMERCIAL

## 2023-08-31 ENCOUNTER — HOSPITAL ENCOUNTER (EMERGENCY)
Facility: CLINIC | Age: 28
Discharge: HOME | End: 2023-08-31
Attending: EMERGENCY MEDICINE | Admitting: EMERGENCY MEDICINE
Payer: COMMERCIAL

## 2023-08-31 ENCOUNTER — NURSE TRIAGE (OUTPATIENT)
Dept: NURSING | Facility: CLINIC | Age: 28
End: 2023-08-31
Payer: MEDICAID

## 2023-08-31 VITALS
DIASTOLIC BLOOD PRESSURE: 82 MMHG | SYSTOLIC BLOOD PRESSURE: 122 MMHG | OXYGEN SATURATION: 98 % | TEMPERATURE: 97.8 F | RESPIRATION RATE: 15 BRPM | HEART RATE: 87 BPM

## 2023-08-31 DIAGNOSIS — M54.50 ACUTE BILATERAL LOW BACK PAIN WITHOUT SCIATICA: ICD-10-CM

## 2023-08-31 LAB
ALBUMIN UR-MCNC: NEGATIVE MG/DL
APPEARANCE UR: CLEAR
BILIRUB UR QL STRIP: NEGATIVE
COLOR UR AUTO: ABNORMAL
GLUCOSE UR STRIP-MCNC: NEGATIVE MG/DL
HCG UR QL: NEGATIVE
HGB UR QL STRIP: NEGATIVE
KETONES UR STRIP-MCNC: NEGATIVE MG/DL
LEUKOCYTE ESTERASE UR QL STRIP: ABNORMAL
MUCOUS THREADS #/AREA URNS LPF: PRESENT /LPF
NITRATE UR QL: NEGATIVE
PH UR STRIP: 7 [PH] (ref 5–7)
RBC URINE: 1 /HPF
SP GR UR STRIP: 1.01 (ref 1–1.03)
SQUAMOUS EPITHELIAL: 8 /HPF
TRANSITIONAL EPI: <1 /HPF
UROBILINOGEN UR STRIP-MCNC: NORMAL MG/DL
WBC URINE: 11 /HPF

## 2023-08-31 PROCEDURE — 72100 X-RAY EXAM L-S SPINE 2/3 VWS: CPT | Mod: 26 | Performed by: STUDENT IN AN ORGANIZED HEALTH CARE EDUCATION/TRAINING PROGRAM

## 2023-08-31 PROCEDURE — 72100 X-RAY EXAM L-S SPINE 2/3 VWS: CPT

## 2023-08-31 PROCEDURE — 81025 URINE PREGNANCY TEST: CPT | Performed by: EMERGENCY MEDICINE

## 2023-08-31 PROCEDURE — 99284 EMERGENCY DEPT VISIT MOD MDM: CPT | Performed by: EMERGENCY MEDICINE

## 2023-08-31 PROCEDURE — 81001 URINALYSIS AUTO W/SCOPE: CPT | Performed by: EMERGENCY MEDICINE

## 2023-08-31 PROCEDURE — 99283 EMERGENCY DEPT VISIT LOW MDM: CPT | Performed by: EMERGENCY MEDICINE

## 2023-08-31 PROCEDURE — 87086 URINE CULTURE/COLONY COUNT: CPT | Performed by: EMERGENCY MEDICINE

## 2023-08-31 RX ORDER — METHOCARBAMOL 500 MG/1
1000 TABLET, FILM COATED ORAL 3 TIMES DAILY
Qty: 30 TABLET | Refills: 0 | Status: SHIPPED | OUTPATIENT
Start: 2023-08-31 | End: 2023-09-05

## 2023-08-31 RX ORDER — IBUPROFEN 200 MG
600 TABLET ORAL 3 TIMES DAILY
Qty: 45 TABLET | Refills: 0 | Status: SHIPPED | OUTPATIENT
Start: 2023-08-31 | End: 2023-09-05

## 2023-08-31 ASSESSMENT — ACTIVITIES OF DAILY LIVING (ADL): ADLS_ACUITY_SCORE: 35

## 2023-08-31 NOTE — Clinical Note
Ileana Thorne was seen and treated in our emergency department on 8/31/2023.  She may return to work on 08/31/2023.  Patient will be unable to lift more than 20 pounds for 1 week     If you have any questions or concerns, please don't hesitate to call.      Steven Mcdermott MD

## 2023-08-31 NOTE — ED PROVIDER NOTES
[place] EMERGENCY DEPARTMENT ([facility])    8/31/23       ED PROVIDER NOTE  VTB    History     Chief Complaint   Patient presents with    Back Pain     HPI  Ileana Thorne is a 27 year old female who presents to the emergency department with complaints of some lumbar back pain that came on last night.  Patient states that she may have been walking more than normal and has been a little bit more active than normal recently. Patient states that she has had some pain like this before but this is worse than normal for her.  Patient states the pain is fairly localized to her lumbar back from about L1 down to L5 but, does not radiate down into her legs or buttocks. Patient denies any fevers and denies any direct trauma. She states that the pain feels like it is on the inside. The patient's past medical history is significant for anxiety, depression, meth addiction, opioid addiction and chronic pain.    This part of the medical record was transcribed by NORA MARTINEZ Medical Scribe, from a dictation done by Steven Mcdermott MD.     Past Medical History  Past Medical History:   Diagnosis Date    Anxiety     Arthritis     Depressive disorder     Essential hypertension 2/25/2015    Substance abuse (H) 2/13/2014    Uncomplicated asthma      Past Surgical History:   Procedure Laterality Date    APPENDECTOMY      open    TONSILLECTOMY & ADENOIDECTOMY         ARIPiprazole (ABILIFY) 10 MG tablet  benztropine (COGENTIN) 0.5 MG tablet  Biotin w/ Vitamins C & E 1250-7.5-7.5 MCG-MG-UNT CHEW  busPIRone (BUSPAR) 15 MG tablet  clindamycin (CLEOCIN) 300 MG capsule  cyclobenzaprine (FLEXERIL) 10 MG tablet  desipramine (NORPRAMIN) 150 MG tablet  escitalopram (LEXAPRO) 20 MG tablet  gabapentin (NEURONTIN) 300 MG capsule  levonorgestrel (PLAN B) 1.5 MG tablet  Melatonin 10 MG TABS tablet  METHADONE HCL PO  prazosin (MINIPRESS) 2 MG capsule  pregabalin (LYRICA) 100 MG capsule  sulfamethoxazole-trimethoprim (BACTRIM  DS/SEPTRA DS) 800-160 MG per tablet      Allergies   Allergen Reactions    Seasonal Allergies Other (See Comments)     Family History  No family history on file.    Social History   Social History     Tobacco Use    Smoking status: Some Days     Types: Cigarettes, Vaping Device    Smokeless tobacco: Never   Vaping Use    Vaping Use: Every day    Substances: Nicotine, Flavoring    Devices: Refillable tank   Substance Use Topics    Alcohol use: No    Drug use: No     Comment: heroin last used about 9/13/16, started taking Methadone at that time, last took Methadone this morning (3/20)      Past medical history, past surgical history, medications, allergies, family history, and social history were reviewed with the patient. No additional pertinent items.      A medically appropriate review of systems was performed with pertinent positives and negatives noted in the HPI, and all other systems negative.    Physical Exam     BP: 122/82  Pulse: 87  Temp: 97.8  F (36.6  C)  Resp: 15  SpO2: 98 %    Physical Exam  Vitals and nursing note reviewed.   Constitutional:       Appearance: Normal appearance.      Comments: Initially sitting in a wheelchair but able to ambulate to the bathroom without difficulty   HENT:      Head: Atraumatic.   Eyes:      Extraocular Movements: Extraocular movements intact.      Pupils: Pupils are equal, round, and reactive to light.   Abdominal:      Palpations: Abdomen is soft.      Tenderness: There is no abdominal tenderness.   Musculoskeletal:         General: No deformity.      Cervical back: Neck supple.      Comments: Patient's back has a minimal tenderness in the midline and minimal tenderness in the lumbar musculature.   Neurological:      General: No focal deficit present.      Mental Status: She is alert and oriented to person, place, and time.   Psychiatric:         Mood and Affect: Mood normal.           ED Course, Procedures, & Data      Procedures        Results for orders placed or  performed during the hospital encounter of 08/31/23   HCG qualitative urine     Status: Normal   Result Value Ref Range    hCG Urine Qualitative Negative Negative     Urinalysis pending    Lumbar x-ray was reviewed by me and found no subluxation or evidence of fracture or malalignment.    Orders Placed This Encounter   Procedures    XR Lumbar Spine 2/3 Views    UA with Microscopic reflex to Culture    HCG qualitative urine       Critical care was not performed.     Medical Decision Making  The patient's presentation was of low complexity (an acute and uncomplicated illness or injury).    The patient's evaluation involved:  ordering and/or review of 3+ test(s) in this encounter (see above)    The patient's management necessitated moderate risk (prescription drug management including medications given in the ED).    Assessment & Plan      I have reviewed the nursing notes.    Most likely the patient is having musculoskeletal pain but the patient's evaluation is not complete and yet she wishes to leave the ER to go to her job.  Patient was told that should her urine show pathology that we may need to do a CT scan or further testing but at this point the patient be started on the medications below and be discharged per her wishes.    I have reviewed the findings, diagnosis, plan and need for follow up with the patient.    New Prescriptions    IBUPROFEN (ADVIL/MOTRIN) 200 MG TABLET    Take 3 tablets (600 mg) by mouth 3 times daily for 5 days    METHOCARBAMOL (ROBAXIN) 500 MG TABLET    Take 2 tablets (1,000 mg) by mouth 3 times daily for 5 days       Final diagnoses:   Acute bilateral low back pain without sciatica     Fill your prescriptions and take as directed.    No lifting more than 20 pounds for 1 week.  Work note given    Check your MyChart later today for your urinalysis results.    Please make an appointment to follow up with Your Primary Care Provider in 5-7 days for recheck  unless symptoms completely  resolve.    Routine discharge instructions were given for this diagnosis      Steven Mcdermott MD  [facility] EMERGENCY DEPARTMENT  8/31/2023     Steven Mcdermott MD  08/31/23 2476

## 2023-08-31 NOTE — ED TRIAGE NOTES
"Pt ambulatory to triage with c/o back pain x3 days. Pt states she has a protruding disk in her upper back. Now having numbness in her BL toes, and lower back pain which feels \"deep\". Pt denies any changes with GI/.       Triage Assessment       Row Name 08/31/23 1119       Triage Assessment (Adult)    Airway WDL WDL       Respiratory WDL    Respiratory WDL WDL       Skin Circulation/Temperature WDL    Skin Circulation/Temperature WDL WDL       Cardiac WDL    Cardiac WDL WDL       Peripheral/Neurovascular WDL    Peripheral Neurovascular WDL WDL       Cognitive/Neuro/Behavioral WDL    Cognitive/Neuro/Behavioral WDL WDL                    "

## 2023-08-31 NOTE — DISCHARGE INSTRUCTIONS
Fill your prescriptions and take as directed.    No lifting more than 20 pounds for 1 week    Check your MyChart later today for your urinalysis results.    Please make an appointment to follow up with Your Primary Care Provider in 5-7 days for recheck  unless symptoms completely resolve.

## 2023-08-31 NOTE — TELEPHONE ENCOUNTER
"\"I don't know if I threw my back out\".   Mid-section of back down to low back.  Any movement causes pain.  Noticed first yesterday after taking a walk.    Pain rated 10/10 with movement.  Okay if she is still.    Per the protocol, I recommended she be seen now in an ER.  Caller stated understanding and agreement.  She doesn't drive but said she can get to an ER.  Unsure what ER she'll go to.      Reason for Disposition   SEVERE abdominal pain (e.g., excruciating)    Additional Information   Negative: Passed out (i.e., fainted, collapsed and was not responding)   Negative: Shock suspected (e.g., cold/pale/clammy skin, too weak to stand, low BP, rapid pulse)   Negative: Sounds like a life-threatening emergency to the triager   Negative: Major injury to the back (e.g., MVA, fall > 10 feet or 3 meters, penetrating injury, etc.)   Negative: Pain in the upper back over the ribs (rib cage) that radiates (travels) into the chest   Negative: Pain in the upper back over the ribs (rib cage) and worsened by coughing (or clearly increases with breathing)   Negative: Back pain during pregnancy   Negative: SEVERE back pain of sudden onset and age > 60 years    Protocols used: Back Pain-A-[place]  Iona HUDSON RN [facility]     "

## 2023-09-01 NOTE — RESULT ENCOUNTER NOTE
[facility] Emergency Dept discharge antibiotic (if prescribed): None  No changes in treatment per [facility] ED Lab Result Urine culture protocol.

## 2023-09-02 LAB — BACTERIA UR CULT: NORMAL

## 2023-09-04 ENCOUNTER — MYC MEDICAL ADVICE (OUTPATIENT)
Dept: FAMILY MEDICINE | Facility: CLINIC | Age: 28
End: 2023-09-04
Payer: MEDICAID

## 2023-09-07 DIAGNOSIS — E66.812 CLASS 2 OBESITY WITHOUT SERIOUS COMORBIDITY WITH BODY MASS INDEX (BMI) OF 36.0 TO 36.9 IN ADULT, UNSPECIFIED OBESITY TYPE: Primary | ICD-10-CM

## 2023-09-15 ENCOUNTER — MYC REFILL (OUTPATIENT)
Dept: INTERNAL MEDICINE | Facility: CLINIC | Age: 28
End: 2023-09-15
Payer: MEDICAID

## 2023-09-15 ENCOUNTER — TELEPHONE (OUTPATIENT)
Dept: FAMILY MEDICINE | Facility: CLINIC | Age: 28
End: 2023-09-15
Payer: MEDICAID

## 2023-09-15 ENCOUNTER — MYC MEDICAL ADVICE (OUTPATIENT)
Dept: FAMILY MEDICINE | Facility: CLINIC | Age: 28
End: 2023-09-15
Payer: MEDICAID

## 2023-09-15 DIAGNOSIS — F41.8 MIXED ANXIETY DEPRESSIVE DISORDER: ICD-10-CM

## 2023-09-15 DIAGNOSIS — F41.8 MIXED ANXIETY DEPRESSIVE DISORDER: Primary | ICD-10-CM

## 2023-09-15 RX ORDER — DESIPRAMINE HYDROCHLORIDE 150 MG/1
150 TABLET ORAL AT BEDTIME
Qty: 60 TABLET | Refills: 0 | Status: SHIPPED | OUTPATIENT
Start: 2023-09-15 | End: 2023-09-15

## 2023-09-15 RX ORDER — ARIPIPRAZOLE 10 MG/1
10 TABLET ORAL AT BEDTIME
Qty: 60 TABLET | Refills: 0 | Status: SHIPPED | OUTPATIENT
Start: 2023-09-15 | End: 2023-09-15

## 2023-09-15 RX ORDER — BUSPIRONE HYDROCHLORIDE 15 MG/1
15 TABLET ORAL 3 TIMES DAILY
Qty: 180 TABLET | Refills: 0 | Status: SHIPPED | OUTPATIENT
Start: 2023-09-15 | End: 2023-09-15

## 2023-09-15 RX ORDER — ARIPIPRAZOLE 10 MG/1
10 TABLET ORAL AT BEDTIME
Qty: 60 TABLET | Refills: 0 | Status: CANCELLED | OUTPATIENT
Start: 2023-09-15

## 2023-09-15 RX ORDER — CLONAZEPAM 2 MG/1
2 TABLET ORAL 2 TIMES DAILY PRN
COMMUNITY
Start: 2023-07-31 | End: 2023-09-15

## 2023-09-15 RX ORDER — BUSPIRONE HYDROCHLORIDE 15 MG/1
15 TABLET ORAL 3 TIMES DAILY
Qty: 180 TABLET | Refills: 0 | Status: SHIPPED | OUTPATIENT
Start: 2023-09-15 | End: 2023-12-07

## 2023-09-15 RX ORDER — CLONAZEPAM 2 MG/1
2 TABLET ORAL 2 TIMES DAILY PRN
Qty: 60 TABLET | Refills: 0 | Status: SHIPPED | OUTPATIENT
Start: 2023-09-15 | End: 2023-09-15

## 2023-09-15 RX ORDER — BUSPIRONE HYDROCHLORIDE 15 MG/1
15 TABLET ORAL 3 TIMES DAILY
Qty: 180 TABLET | Refills: 0 | Status: CANCELLED | OUTPATIENT
Start: 2023-09-15

## 2023-09-15 RX ORDER — DESIPRAMINE HYDROCHLORIDE 150 MG/1
150 TABLET ORAL AT BEDTIME
Qty: 60 TABLET | Refills: 0 | Status: SHIPPED | OUTPATIENT
Start: 2023-09-15 | End: 2023-11-08

## 2023-09-15 RX ORDER — ARIPIPRAZOLE 10 MG/1
10 TABLET ORAL AT BEDTIME
Qty: 60 TABLET | Refills: 0 | Status: SHIPPED | OUTPATIENT
Start: 2023-09-15 | End: 2023-12-04

## 2023-09-15 RX ORDER — CLONAZEPAM 2 MG/1
2 TABLET ORAL 2 TIMES DAILY PRN
Qty: 60 TABLET | Refills: 0 | Status: CANCELLED | OUTPATIENT
Start: 2023-09-15

## 2023-09-15 RX ORDER — CLONAZEPAM 2 MG/1
2 TABLET ORAL 2 TIMES DAILY PRN
Qty: 60 TABLET | Refills: 0 | Status: SHIPPED | OUTPATIENT
Start: 2023-09-15 | End: 2023-10-04

## 2023-09-15 RX ORDER — DESIPRAMINE HYDROCHLORIDE 150 MG/1
150 TABLET ORAL AT BEDTIME
Qty: 60 TABLET | Refills: 0 | Status: CANCELLED | OUTPATIENT
Start: 2023-09-15

## 2023-09-15 NOTE — TELEPHONE ENCOUNTER
General Call      Reason for Call:  pt stating that the pharmacy that meds were sent to, does not except her insurance -- therefore all 4 Rx's need to be resent to [address] in [facility]    Please let patient when these have been resent    What are your questions or concerns:  n/a    Date of last appointment with provider: n/a    Could we send this information to you in [facility] or would you prefer to receive a phone call?:   Patient would prefer a phone call   Okay to leave a detailed message?: Yes at Cell number on file:    Telephone Information:   Mobile [phone]

## 2023-09-15 NOTE — TELEPHONE ENCOUNTER
Called and spoke with Vanna. Short refill of medications until she can be seen by psychiatry. Placed referral for this. LACY Blas CNP

## 2023-09-30 ENCOUNTER — E-VISIT (OUTPATIENT)
Dept: INTERNAL MEDICINE | Facility: CLINIC | Age: 28
End: 2023-09-30
Payer: COMMERCIAL

## 2023-09-30 DIAGNOSIS — Z76.0 ENCOUNTER FOR MEDICATION REFILL: Primary | ICD-10-CM

## 2023-09-30 PROCEDURE — 99207 PR NON-BILLABLE SERV PER CHARTING: CPT

## 2023-10-01 NOTE — PATIENT INSTRUCTIONS
Thank you for choosing us for your care. I think an in-clinic visit would be best next steps based on your symptoms. Please schedule a clinic appointment; you won t be charged for this eVisit.      You can schedule an appointment right here in [facility], or call [phone]

## 2023-10-04 ENCOUNTER — OFFICE VISIT (OUTPATIENT)
Dept: FAMILY MEDICINE | Facility: CLINIC | Age: 28
End: 2023-10-04
Payer: MEDICAID

## 2023-10-04 VITALS
WEIGHT: 248 LBS | HEIGHT: 65 IN | HEART RATE: 117 BPM | OXYGEN SATURATION: 100 % | DIASTOLIC BLOOD PRESSURE: 86 MMHG | RESPIRATION RATE: 17 BRPM | SYSTOLIC BLOOD PRESSURE: 132 MMHG | BODY MASS INDEX: 41.32 KG/M2 | TEMPERATURE: 98.7 F

## 2023-10-04 DIAGNOSIS — M79.89 SWELLING OF LOWER EXTREMITY: Primary | ICD-10-CM

## 2023-10-04 DIAGNOSIS — F41.8 MIXED ANXIETY DEPRESSIVE DISORDER: ICD-10-CM

## 2023-10-04 PROCEDURE — 99214 OFFICE O/P EST MOD 30 MIN: CPT

## 2023-10-04 RX ORDER — CLONAZEPAM 2 MG/1
2 TABLET ORAL 2 TIMES DAILY PRN
Qty: 60 TABLET | Refills: 0 | Status: SHIPPED | OUTPATIENT
Start: 2023-10-04 | End: 2023-10-04

## 2023-10-04 RX ORDER — CLONAZEPAM 2 MG/1
2 TABLET ORAL 2 TIMES DAILY PRN
Qty: 60 TABLET | Refills: 0 | Status: SHIPPED | OUTPATIENT
Start: 2023-10-04 | End: 2023-10-22

## 2023-10-04 RX ORDER — PROPRANOLOL HYDROCHLORIDE 20 MG/1
40 TABLET ORAL 2 TIMES DAILY
COMMUNITY
Start: 2023-10-01 | End: 2023-10-22

## 2023-10-04 ASSESSMENT — ANXIETY QUESTIONNAIRES
7. FEELING AFRAID AS IF SOMETHING AWFUL MIGHT HAPPEN: MORE THAN HALF THE DAYS
1. FEELING NERVOUS, ANXIOUS, OR ON EDGE: MORE THAN HALF THE DAYS
2. NOT BEING ABLE TO STOP OR CONTROL WORRYING: MORE THAN HALF THE DAYS
5. BEING SO RESTLESS THAT IT IS HARD TO SIT STILL: NEARLY EVERY DAY
GAD7 TOTAL SCORE: 18
3. WORRYING TOO MUCH ABOUT DIFFERENT THINGS: NEARLY EVERY DAY
GAD7 TOTAL SCORE: 18
6. BECOMING EASILY ANNOYED OR IRRITABLE: NEARLY EVERY DAY
4. TROUBLE RELAXING: NEARLY EVERY DAY
IF YOU CHECKED OFF ANY PROBLEMS ON THIS QUESTIONNAIRE, HOW DIFFICULT HAVE THESE PROBLEMS MADE IT FOR YOU TO DO YOUR WORK, TAKE CARE OF THINGS AT HOME, OR GET ALONG WITH OTHER PEOPLE: VERY DIFFICULT

## 2023-10-04 ASSESSMENT — COLUMBIA-SUICIDE SEVERITY RATING SCALE - C-SSRS
2. IN THE PAST MONTH, HAVE YOU ACTUALLY HAD ANY THOUGHTS OF KILLING YOURSELF?: NO
1. WITHIN THE PAST MONTH, HAVE YOU WISHED YOU WERE DEAD OR WISHED YOU COULD GO TO SLEEP AND NOT WAKE UP?: YES
6. HAVE YOU EVER DONE ANYTHING, STARTED TO DO ANYTHING, OR PREPARED TO DO ANYTHING TO END YOUR LIFE?: NO

## 2023-10-04 ASSESSMENT — PATIENT HEALTH QUESTIONNAIRE - PHQ9
SUM OF ALL RESPONSES TO PHQ QUESTIONS 1-9: 26
10. IF YOU CHECKED OFF ANY PROBLEMS, HOW DIFFICULT HAVE THESE PROBLEMS MADE IT FOR YOU TO DO YOUR WORK, TAKE CARE OF THINGS AT HOME, OR GET ALONG WITH OTHER PEOPLE: EXTREMELY DIFFICULT
SUM OF ALL RESPONSES TO PHQ QUESTIONS 1-9: 26
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
## 2023-10-04 NOTE — COMMUNITY RESOURCES LIST (ENGLISH)
10/04/2023   [facility]  N/A  For questions about this resource list or additional care needs, please contact your primary care clinic or care manager.  Phone: [phone]   Email: N/A   Address: [address] [place] [address]   Hours: N/A        Financial Stability       Rent and mortgage payment assistance  1  [facility] Distance: 1.35 miles      In-Person, Phone/Virtual   [address] [place] [address]  Language: English, Salvadorean  Hours: Mon - Fri 8:00 AM - 4:30 PM  Fees: Insurance   Phone: (464) 359-6312 Email: roots@[employer] Website: https://[employer]/roots/     2  [facility] - Section 811 Supportive Housing Project-Based Rental Assistance Program (811 PRA) Distance: 4.01 miles      Phone/Virtual   [address] [facility] [address] [place] [address]  Language: English  Hours: Mon - Fri 8:00 AM - 5:00 PM Appt. Only  Fees: Free   Phone: (572) 222-8523 Email: [place].housing@[facility]. Website: https://www.[facility] CenterAmberAds.gov/index.html     Utility payment assistance  3  [employer] ([facility]) [facility] - Energy Assistance Distance: 1.18 miles      Phone/Virtual   [address] [place] [address]  Language: English, Hmong, Macanese, Salvadorean  Hours: Mon - Fri 8:00 AM - 4:30 PM  Fees: Free   Phone: (855) 480-3060 Email: info@caprw.org Website: http://www.caprw.org/     4  [facility] Distance: 3.44 miles      Phone/Virtual   [address] [place] [address]  Language: English, Hmong, Azeri, Salvadorean  Hours: Mon - Fri 10:00 AM - 3:00 PM  Fees: Free   Phone: (786) 822-3225 Email: Ivonne@[facility].salvationarmy.org Website: http://salvationarmynorth.org/community/[facility]/          Food and Nutrition       Food pantry  5  [facility] Distance: 0.25 miles      [facility]    [address] [place] [address]  Language: English, Georgian, New Zealander  Hours: Mon - Fri 12:00 PM - 1:00 PM  Fees: Free   Phone: (860) 657-4766 Email: info@[employer] Website: https://www.[facility] HealthcareYapp/locations/\[facility] [place] [facility]\""_[place]_ymca     6  [facility] Distance: 0.3 miles      In-Person, Delivery   [address] [place] [address]  Language: English, New Zealander  Hours: Mon - Fri 10:00 AM - 12:00 PM , Mon - Tue 1:30 PM - 3:30 PM , Wed 5:00 PM - 7:00 PM , Thu - Fri 1:30 PM - 3:30 PM  Fees: Free   Phone: (946) 254-6054 Email: info@PSafe Website: https://[facility] HospitalYapp/food-shelves/     SNAP application assistance  7  [employer] ([facility]) [facility] Distance: 1.18 miles      Phone/Virtual   [address] [place] [address]  Language: English  Hours: Mon - Fri 8:00 AM - 4:30 PM  Fees: Free   Phone: (834) 134-7708 Email: info@caprw.org Website: http://www.caprw.org/     8  [facility] - SNAP Ed - SNAP Ed Distance: 2.14 miles      In-Person   [address] [place] [address]  Language: English, Hmong, Oromo, Georgian, New Zealander  Hours: Mon - Fri 9:00 AM - 5:00 PM Appt. Only  Fees: Free   Phone: (614) 931-7843 Email: mnext@[facility] Website: https://extension.[facility]/nutrition-education/snap-ed-educational-offerings     Soup kitchen or free meals  9  [facility] [place] Distance: 0.47 miles      Pickup   [address] [place] [address]  Language: English  Hours: Mon 12:00 PM - 2:00 PM , Wed 12:00 PM - 2:00 PM  Fees: Free   Phone: (783) 516-9136 Ext.4 Email: info@[employer] Website: http://www.[employer]     10  [employer] [place] [facility] - Free Summer Meals Distance: 0.92 miles      In-Person   [address] [place] [address]  Language: English  Hours: Mon - Fri 12:30 PM - 1:30 PM , Mon - Fri 4:00 PM - 5:00 PM  Fees: Free   Phone: (952)  691-2463 Email: kylee@.[facility]. Website: https://www.[facility]/facilities/eozqgif-xwvqfjsemc-iqmqzx          Transportation       Free or low-cost transportation  11  [employer] Distance: 1.21 miles      In-Person   [address] [place] [address]  Language: English, Argentine  Hours: Mon 9:00 AM - 5:00 PM , Tue 9:30 AM - 7:00 PM , Wed 9:00 AM - 5:00 PM , Thu 9:30 AM - 7:00 PM , Fri 9:00 AM - 5:00 PM  Fees: Free   Phone: (403) 359-6595 Email: contactus@[employer] Website: http://www.[employer]     12  [facility] Distance: 4.17 miles      In-Person   [address] [place] [address]  Language: English  Hours: Mon - Fri 8:00 AM - 3:00 PM  Fees: Free   Phone: (110) 494-3436 Ext.14 Email: neighborhood@[facility] Website: http://www.[facility]     Transportation to medical appointments  13  [facility] - Non-Emergency Medical Transportation Distance: 3.52 miles      In-Person   [address] [place] [address]  Language: English  Hours: Mon - Fri 8:00 AM - 4:00 PM Appt. Only  Fees: Self Pay   Phone: (789) 554-6972 Website: http://www.allAmazing Global Technologieshealth.org/Medical-Services/Emergency-medical-services/Non-emergency-transportation/     14  [facility] Ride Distance: 5.32 miles      In-Person   [address] [address]  Language: English  Hours: Mon - Thu 6:00 AM - 6:00 PM , Fri 6:00 AM - 5:00 PM  Fees: Insurance, Self Pay   Phone: (382) 274-8504 Email: office@[employer] Website: https://www.[employer]/          Important Numbers & Websites       Emergency Services   911  City Services   311  Poison Control   (507) 249-3354  Suicide Prevention Lifeline   (276) 743-4812 (TALK)  Child Abuse Hotline   (962) 582-9242 (4-A-Child)  Sexual Assault Hotline   (710) 506-7268 (HOPE)  National Runaway Safeline   (343) 474-8591 (RUNAWAY)  All-Options Talkline   (155) 327-2460  Substance Abuse Referral   (212) 345-6817  (HELP)

## 2023-10-04 NOTE — LETTER
[facility]  10/04/23  Patient: Ileana Thorne  YOB: 1995  Medical Record Number: [id number]                                                                                  Non-Opioid Controlled Substance Agreement    This is an agreement between you and your provider regarding safe and appropriate use of controlled substances prescribed by your care team. Controlled substances are?medicines that can cause physical and mental dependence (abuse).     There are strict laws about having and using these medicines. We here at [facility] are  committed to working with you in your efforts to get better. To support you in this work, we'll help you schedule regular office appointments for medicine refills. If we must cancel or change your appointment for any reason, we'll make sure you have enough medicine to last until your next appointment.     As a Provider, I will:   Listen carefully to your concerns while treating you with respect.   Recommend a treatment plan that I believe is in your best interest and may involve therapies other than medicine.    Talk with you often about the possible benefits and the risk of harm of any medicine that we prescribe for you.  Assess the safety of this medicine and check how well it works.    Provide a plan on how to taper (discontinue or go off) using this medicine if the decision is made to stop its use.      ::  As a Patient, I understand controlled substances:     Are prescribed by my care provider to help me function or work and manage my condition(s).?  Are strong medicines and can cause serious side effects.     Need to be taken exactly as prescribed.?Combining controlled substances with certain medicines or chemicals (such as illegal drugs, alcohol, sedatives, sleeping pills, and benzodiazepines) can be dangerous or even fatal.? If I stop taking my medicines suddenly, I may have severe withdrawal symptoms.     The risks, benefits, and  side effects of these medicine(s) were explained to me. I agree that:    I will take part in other treatments as advised by my care team. This may be psychiatry or counseling, physical therapy, behavioral therapy, group treatment or a referral to specialist.    I will keep all my appointments and understand this is part of the monitoring of controlled substances.?My care team may require an office visit for EVERY controlled substance refill. If I miss appointments or don t follow instructions, my care team may stop my medicine    I will take my medicines as prescribed. I will not change the dose or schedule unless my care team tells me to. There will be no refills if I run out early.      I may be asked to come to the clinic and complete a urine drug test or complete a pill count. If I don t give a urine sample or participate in a pill count, the care team may stop my medicine.    I will only receive controlled substance prescriptions from this clinic. If I am treated by another provider, I will tell them that I am taking controlled substances and that I have a treatment agreement with this provider. I will inform my [facility] care team within one business day if I am given a prescription for any controlled substance by another healthcare provider. My [facility] care team can contact other providers and pharmacists about my use of any medicines.    It is up to me to make sure that I don't run out of my medicines on weekends or holidays.?If my care team is willing to refill my prescription without a visit, I must request refills only during office hours. Refills may take up to 3 business days to process. I will use one pharmacy to fill all my controlled substance prescriptions. I will notify the clinic about any changes to my insurance or medicine availability.    I am responsible for my prescriptions. If the medicine/prescription is lost, stolen or destroyed, it will not be replaced.?I also agree not  to share controlled substance medicines with anyone.     I am aware I should not use any illegal or recreational drugs. I agree not to drink alcohol unless my care team says I can.     If I enroll in the [place] Medical Cannabis program, I will tell my care team before my next refill.    I will tell my care team right away if I become pregnant, have a new medical problem treated outside of my regular clinic, or have a change in my medicines.     I understand that this medicine can affect my thinking, judgment and reaction time.? Alcohol and drugs affect the brain and body, which can affect the safety of my driving. Being under the influence of alcohol or drugs can affect my decision-making, behaviors, personal safety and the safety of others. Driving while impaired (DWI) can occur if a person is driving, operating or in physical control of a car, motorcycle, boat, snowmobile, ATV, motorbike, off-road vehicle or any other motor vehicle ([place] Statute 169A.20). I understand the risk if I choose to drive or operate any vehicle or machinery.    I understand that if I do not follow any of the conditions above, my prescriptions or treatment may be stopped or changed.   I agree that my provider, clinic care team and pharmacy may work with any city, state or federal law enforcement agency that investigates the misuse, sale or other diversion of my controlled medicine. I will allow my provider to discuss my care with, or share a copy of, this agreement with any other treating provider, pharmacy or emergency room where I receive care.     I have read this agreement and have asked questions about anything I did not understand.    ________________________________________________________  Patient Signature - Ileana Thorne     ___________________                   Date     ________________________________________________________  Provider Signature - LACY Blas CNP       ___________________                   Date      ________________________________________________________  Witness Signature (required if provider not present while patient signing)          ___________________                   Date

## 2023-10-05 ENCOUNTER — PATIENT OUTREACH (OUTPATIENT)
Dept: CARE COORDINATION | Facility: CLINIC | Age: 28
End: 2023-10-05
Payer: MEDICAID
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
## 2023-10-05 NOTE — LETTER
[facility] CARE COORDINATION  [facility]  October 5, 2023    Ileana Starkeyuk  [address]  [address]  [place] [address]      Dear Ileana,        I am a  clinic care coordinator who works with LACY Blas CNP with the [facility]. I wanted to introduce myself and provide you with my contact information for you to be able to call me with any questions or concerns. Below is a description of clinic care coordination and how I can further assist you.       The clinic care coordination team is made up of a registered nurse, , financial resource worker and community health worker who understand the health care system. The goal of clinic care coordination is to help you manage your health and improve access to the health care system. Our team works alongside your provider to assist you in determining your health and social needs. We can help you obtain health care and community resources, providing you with necessary information and education. We can work with you through any barriers and develop a care plan that helps coordinate and strengthen the communication between you and your care team.  Our services are voluntary and are offered without charge to you personally.    Please feel free to contact me with any questions or concerns regarding care coordination and what we can offer.      We are focused on providing you with the highest-quality healthcare experience possible.    Sincerely,     Taylor Griffith,   [facility]  [phone]

## 2023-10-05 NOTE — Clinical Note
You can read my note. I talked to mom and she is unable to be the go between.  I sent her My Chart Letter and a patient message.  If she responds, I will let you know.  Thanks, Taylor

## 2023-10-13 ENCOUNTER — TELEPHONE (OUTPATIENT)
Dept: FAMILY MEDICINE | Facility: CLINIC | Age: 28
End: 2023-10-13
Payer: MEDICAID

## 2023-10-13 NOTE — TELEPHONE ENCOUNTER
General Call      Reason for Call:  pt stating she was given Suboxone this was given to her at ER at the time she had an over dose    She is requesting a refill    Pharm:  [facility]  Phone:  [phone]    What are your questions or concerns:  n/a    Date of last appointment with provider: n/a    Could we send this information to you in [employer] or would you prefer to receive a phone call?:   Patient would prefer a phone call   Okay to leave a detailed message?: Yes at Other phone number:  [phone]

## 2023-10-17 RX ORDER — BUPRENORPHINE AND NALOXONE 4; 1 MG/1; MG/1
1 FILM, SOLUBLE BUCCAL; SUBLINGUAL 2 TIMES DAILY PRN
COMMUNITY
Start: 2023-10-01 | End: 2023-12-07

## 2023-10-17 NOTE — TELEPHONE ENCOUNTER
Called and spoke with Vanna. She has [facility] care with [facility]. Per Vanna, they will be prescribing her suboxone/methadone and helping her find a therapist and psychiatry. Discussed that I can provide bridging of her medications until she is able to see psychiatry, she does not need any refills at this time. LACY Blas CNP

## 2023-10-17 NOTE — TELEPHONE ENCOUNTER
Tried calling patient at phone number listed for more information regarding medication request. Was told wrong number. Also tried calling phone number on file with no answer.

## 2023-10-20 DIAGNOSIS — Z30.012 ENCOUNTER FOR EMERGENCY CONTRACEPTION: ICD-10-CM

## 2023-10-20 RX ORDER — LEVONORGESTREL 1.5 MG/1
1.5 TABLET ORAL ONCE
Qty: 1 TABLET | Refills: 1 | Status: SHIPPED | OUTPATIENT
Start: 2023-10-20 | End: 2023-12-04

## 2023-10-22 ENCOUNTER — MYC REFILL (OUTPATIENT)
Dept: FAMILY MEDICINE | Facility: CLINIC | Age: 28
End: 2023-10-22
Payer: MEDICAID

## 2023-10-22 DIAGNOSIS — F41.9 ANXIETY: Primary | ICD-10-CM

## 2023-10-22 DIAGNOSIS — F41.8 MIXED ANXIETY DEPRESSIVE DISORDER: ICD-10-CM

## 2023-10-24 RX ORDER — CLONAZEPAM 2 MG/1
2 TABLET ORAL 2 TIMES DAILY PRN
Qty: 60 TABLET | Refills: 1 | Status: SHIPPED | OUTPATIENT
Start: 2023-10-24 | End: 2023-12-22

## 2023-10-24 NOTE — TELEPHONE ENCOUNTER
She is in the process of establishing with a new psychiatrist through her treatment center. Refill provided until she has this appointment. LACY Blas CNP

## 2023-10-25 ENCOUNTER — MYC REFILL (OUTPATIENT)
Dept: FAMILY MEDICINE | Facility: CLINIC | Age: 28
End: 2023-10-25
Payer: MEDICAID

## 2023-10-25 DIAGNOSIS — G89.29 OTHER CHRONIC PAIN: Primary | ICD-10-CM

## 2023-10-25 RX ORDER — PROPRANOLOL HYDROCHLORIDE 20 MG/1
40 TABLET ORAL 2 TIMES DAILY
Qty: 120 TABLET | Refills: 2 | Status: SHIPPED | OUTPATIENT
Start: 2023-10-25 | End: 2023-11-06

## 2023-10-25 RX ORDER — PREGABALIN 100 MG/1
100 CAPSULE ORAL 3 TIMES DAILY
Qty: 180 CAPSULE | Refills: 1 | Status: SHIPPED | OUTPATIENT
Start: 2023-10-25 | End: 2024-02-14

## 2023-10-30 ENCOUNTER — MEDICAL CORRESPONDENCE (OUTPATIENT)
Dept: HEALTH INFORMATION MANAGEMENT | Facility: CLINIC | Age: 28
End: 2023-10-30
Payer: MEDICAID

## 2023-11-01 ENCOUNTER — HOSPITAL ENCOUNTER (EMERGENCY)
Facility: CLINIC | Age: 28
Discharge: HOME | End: 2023-11-02
Attending: EMERGENCY MEDICINE | Admitting: EMERGENCY MEDICINE
Payer: MEDICAID

## 2023-11-01 ENCOUNTER — TELEPHONE (OUTPATIENT)
Dept: BEHAVIORAL HEALTH | Facility: CLINIC | Age: 28
End: 2023-11-01

## 2023-11-01 ENCOUNTER — APPOINTMENT (OUTPATIENT)
Dept: ULTRASOUND IMAGING | Facility: CLINIC | Age: 28
End: 2023-11-01
Attending: EMERGENCY MEDICINE
Payer: MEDICAID

## 2023-11-01 DIAGNOSIS — M79.89 SWELLING OF BOTH LOWER EXTREMITIES: ICD-10-CM

## 2023-11-01 DIAGNOSIS — F11.20 UNCOMPLICATED OPIOID DEPENDENCE (H): Primary | ICD-10-CM

## 2023-11-01 DIAGNOSIS — R45.851 SUICIDAL IDEATION: ICD-10-CM

## 2023-11-01 LAB
ALBUMIN SERPL BCG-MCNC: 4.5 G/DL (ref 3.5–5.2)
ALBUMIN UR-MCNC: NEGATIVE MG/DL
ALP SERPL-CCNC: 80 U/L (ref 35–104)
ALT SERPL W P-5'-P-CCNC: 29 U/L (ref 0–50)
AMPHETAMINES UR QL SCN: ABNORMAL
ANION GAP SERPL CALCULATED.3IONS-SCNC: 12 MMOL/L (ref 7–15)
APPEARANCE UR: CLEAR
AST SERPL W P-5'-P-CCNC: 28 U/L (ref 0–45)
BACTERIA #/AREA URNS HPF: ABNORMAL /HPF
BARBITURATES UR QL SCN: ABNORMAL
BASOPHILS # BLD AUTO: 0.1 10E3/UL (ref 0–0.2)
BASOPHILS NFR BLD AUTO: 1 %
BENZODIAZ UR QL SCN: ABNORMAL
BILIRUB SERPL-MCNC: 0.2 MG/DL
BILIRUB UR QL STRIP: NEGATIVE
BUN SERPL-MCNC: 13.8 MG/DL (ref 6–20)
BZE UR QL SCN: ABNORMAL
CALCIUM SERPL-MCNC: 9.7 MG/DL (ref 8.6–10)
CANNABINOIDS UR QL SCN: ABNORMAL
CHLORIDE SERPL-SCNC: 102 MMOL/L (ref 98–107)
COLOR UR AUTO: ABNORMAL
CREAT SERPL-MCNC: 0.85 MG/DL (ref 0.51–0.95)
DEPRECATED HCO3 PLAS-SCNC: 26 MMOL/L (ref 22–29)
EGFRCR SERPLBLD CKD-EPI 2021: >90 ML/MIN/1.73M2
EOSINOPHIL # BLD AUTO: 0.2 10E3/UL (ref 0–0.7)
EOSINOPHIL NFR BLD AUTO: 3 %
ERYTHROCYTE [DISTWIDTH] IN BLOOD BY AUTOMATED COUNT: 11.8 % (ref 10–15)
FENTANYL UR QL: ABNORMAL
GLUCOSE SERPL-MCNC: 102 MG/DL (ref 70–99)
GLUCOSE UR STRIP-MCNC: NEGATIVE MG/DL
HCG UR QL: NEGATIVE
HCT VFR BLD AUTO: 41.5 % (ref 35–47)
HGB BLD-MCNC: 14.1 G/DL (ref 11.7–15.7)
HGB UR QL STRIP: NEGATIVE
HOLD SPECIMEN: NORMAL
HOLD SPECIMEN: NORMAL
IMM GRANULOCYTES # BLD: 0 10E3/UL
IMM GRANULOCYTES NFR BLD: 0 %
KETONES UR STRIP-MCNC: NEGATIVE MG/DL
LEUKOCYTE ESTERASE UR QL STRIP: ABNORMAL
LYMPHOCYTES # BLD AUTO: 1.4 10E3/UL (ref 0.8–5.3)
LYMPHOCYTES NFR BLD AUTO: 21 %
MCH RBC QN AUTO: 30.9 PG (ref 26.5–33)
MCHC RBC AUTO-ENTMCNC: 34 G/DL (ref 31.5–36.5)
MCV RBC AUTO: 91 FL (ref 78–100)
MONOCYTES # BLD AUTO: 0.4 10E3/UL (ref 0–1.3)
MONOCYTES NFR BLD AUTO: 5 %
NEUTROPHILS # BLD AUTO: 4.6 10E3/UL (ref 1.6–8.3)
NEUTROPHILS NFR BLD AUTO: 70 %
NITRATE UR QL: NEGATIVE
NRBC # BLD AUTO: 0 10E3/UL
NRBC BLD AUTO-RTO: 0 /100
NT-PROBNP SERPL-MCNC: 292 PG/ML (ref 0–450)
OPIATES UR QL SCN: ABNORMAL
PCP QUAL URINE (ROCHE): ABNORMAL
PH UR STRIP: 7.5 [PH] (ref 5–7)
PLATELET # BLD AUTO: 175 10E3/UL (ref 150–450)
POTASSIUM SERPL-SCNC: 4.6 MMOL/L (ref 3.4–5.3)
PROT SERPL-MCNC: 7.5 G/DL (ref 6.4–8.3)
RBC # BLD AUTO: 4.56 10E6/UL (ref 3.8–5.2)
RBC URINE: <1 /HPF
SODIUM SERPL-SCNC: 140 MMOL/L (ref 135–145)
SP GR UR STRIP: 1.01 (ref 1–1.03)
SQUAMOUS EPITHELIAL: 2 /HPF
TROPONIN T SERPL HS-MCNC: <6 NG/L
TSH SERPL DL<=0.005 MIU/L-ACNC: 0.43 UIU/ML (ref 0.3–4.2)
UROBILINOGEN UR STRIP-MCNC: NORMAL MG/DL
WBC # BLD AUTO: 6.6 10E3/UL (ref 4–11)
WBC URINE: 0 /HPF

## 2023-11-01 PROCEDURE — 80053 COMPREHEN METABOLIC PANEL: CPT | Performed by: EMERGENCY MEDICINE

## 2023-11-01 PROCEDURE — 96374 THER/PROPH/DIAG INJ IV PUSH: CPT

## 2023-11-01 PROCEDURE — 83880 ASSAY OF NATRIURETIC PEPTIDE: CPT | Performed by: EMERGENCY MEDICINE

## 2023-11-01 PROCEDURE — 80307 DRUG TEST PRSMV CHEM ANLYZR: CPT | Performed by: EMERGENCY MEDICINE

## 2023-11-01 PROCEDURE — 250N000011 HC RX IP 250 OP 636: Mod: JZ | Performed by: EMERGENCY MEDICINE

## 2023-11-01 PROCEDURE — 87086 URINE CULTURE/COLONY COUNT: CPT | Performed by: EMERGENCY MEDICINE

## 2023-11-01 PROCEDURE — 93970 EXTREMITY STUDY: CPT

## 2023-11-01 PROCEDURE — 84484 ASSAY OF TROPONIN QUANT: CPT | Performed by: EMERGENCY MEDICINE

## 2023-11-01 PROCEDURE — 250N000013 HC RX MED GY IP 250 OP 250 PS 637: Performed by: EMERGENCY MEDICINE

## 2023-11-01 PROCEDURE — 93970 EXTREMITY STUDY: CPT | Mod: 26 | Performed by: RADIOLOGY

## 2023-11-01 PROCEDURE — 84443 ASSAY THYROID STIM HORMONE: CPT | Performed by: EMERGENCY MEDICINE

## 2023-11-01 PROCEDURE — 96372 THER/PROPH/DIAG INJ SC/IM: CPT | Mod: XS | Performed by: EMERGENCY MEDICINE

## 2023-11-01 PROCEDURE — 85004 AUTOMATED DIFF WBC COUNT: CPT | Performed by: EMERGENCY MEDICINE

## 2023-11-01 PROCEDURE — 81025 URINE PREGNANCY TEST: CPT | Performed by: EMERGENCY MEDICINE

## 2023-11-01 PROCEDURE — 81001 URINALYSIS AUTO W/SCOPE: CPT | Mod: XU | Performed by: EMERGENCY MEDICINE

## 2023-11-01 PROCEDURE — 36415 COLL VENOUS BLD VENIPUNCTURE: CPT | Performed by: EMERGENCY MEDICINE

## 2023-11-01 RX ORDER — ACETAMINOPHEN 500 MG
1000 TABLET ORAL ONCE
Status: COMPLETED | OUTPATIENT
Start: 2023-11-01 | End: 2023-11-01

## 2023-11-01 RX ORDER — NICOTINE 21 MG/24HR
1 PATCH, TRANSDERMAL 24 HOURS TRANSDERMAL DAILY
Status: DISCONTINUED | OUTPATIENT
Start: 2023-11-01 | End: 2023-11-02 | Stop reason: HOSPADM

## 2023-11-01 RX ORDER — PROPRANOLOL HYDROCHLORIDE 20 MG/1
20 TABLET ORAL 2 TIMES DAILY
Status: DISCONTINUED | OUTPATIENT
Start: 2023-11-01 | End: 2023-11-02 | Stop reason: HOSPADM

## 2023-11-01 RX ORDER — BENZTROPINE MESYLATE 1 MG/1
1 TABLET ORAL AT BEDTIME
Status: DISCONTINUED | OUTPATIENT
Start: 2023-11-01 | End: 2023-11-02 | Stop reason: SINTOL

## 2023-11-01 RX ORDER — ACETAMINOPHEN 325 MG/1
650 TABLET ORAL EVERY 6 HOURS PRN
Status: DISCONTINUED | OUTPATIENT
Start: 2023-11-01 | End: 2023-11-02 | Stop reason: HOSPADM

## 2023-11-01 RX ORDER — CLONAZEPAM 1 MG/1
2 TABLET ORAL 2 TIMES DAILY PRN
Status: DISCONTINUED | OUTPATIENT
Start: 2023-11-01 | End: 2023-11-02 | Stop reason: HOSPADM

## 2023-11-01 RX ORDER — OLANZAPINE 10 MG/2ML
INJECTION, POWDER, FOR SOLUTION INTRAMUSCULAR
Status: DISCONTINUED
Start: 2023-11-01 | End: 2023-11-02 | Stop reason: HOSPADM

## 2023-11-01 RX ORDER — KETOROLAC TROMETHAMINE 15 MG/ML
15 INJECTION, SOLUTION INTRAMUSCULAR; INTRAVENOUS ONCE
Status: COMPLETED | OUTPATIENT
Start: 2023-11-01 | End: 2023-11-01

## 2023-11-01 RX ORDER — OLANZAPINE 10 MG/2ML
10 INJECTION, POWDER, FOR SOLUTION INTRAMUSCULAR ONCE
Status: COMPLETED | OUTPATIENT
Start: 2023-11-01 | End: 2023-11-01

## 2023-11-01 RX ORDER — ARIPIPRAZOLE 10 MG/1
10 TABLET ORAL AT BEDTIME
Status: DISCONTINUED | OUTPATIENT
Start: 2023-11-01 | End: 2023-11-02 | Stop reason: SINTOL

## 2023-11-01 RX ORDER — BUSPIRONE HYDROCHLORIDE 15 MG/1
15 TABLET ORAL 3 TIMES DAILY
Status: DISCONTINUED | OUTPATIENT
Start: 2023-11-01 | End: 2023-11-02 | Stop reason: HOSPADM

## 2023-11-01 RX ORDER — LIDOCAINE 4 G/G
2 PATCH TOPICAL ONCE
Status: COMPLETED | OUTPATIENT
Start: 2023-11-01 | End: 2023-11-02

## 2023-11-01 RX ORDER — LEVONORGESTREL 1.5 MG/1
1.5 TABLET ORAL ONCE
Status: DISCONTINUED | OUTPATIENT
Start: 2023-11-01 | End: 2023-11-01

## 2023-11-01 RX ORDER — DESIPRAMINE HYDROCHLORIDE 25 MG/1
150 TABLET ORAL AT BEDTIME
Status: DISCONTINUED | OUTPATIENT
Start: 2023-11-01 | End: 2023-11-02 | Stop reason: HOSPADM

## 2023-11-01 RX ORDER — IBUPROFEN 600 MG/1
600 TABLET, FILM COATED ORAL ONCE
Status: COMPLETED | OUTPATIENT
Start: 2023-11-01 | End: 2023-11-01

## 2023-11-01 RX ORDER — PREGABALIN 100 MG/1
100 CAPSULE ORAL 2 TIMES DAILY
Status: DISCONTINUED | OUTPATIENT
Start: 2023-11-01 | End: 2023-11-02 | Stop reason: HOSPADM

## 2023-11-01 RX ORDER — IBUPROFEN 600 MG/1
600 TABLET, FILM COATED ORAL EVERY 6 HOURS PRN
Status: DISCONTINUED | OUTPATIENT
Start: 2023-11-01 | End: 2023-11-02 | Stop reason: HOSPADM

## 2023-11-01 RX ORDER — PRAZOSIN HYDROCHLORIDE 2 MG/1
2 CAPSULE ORAL
Status: DISCONTINUED | OUTPATIENT
Start: 2023-11-01 | End: 2023-11-02 | Stop reason: HOSPADM

## 2023-11-01 RX ADMIN — ARIPIPRAZOLE 10 MG: 10 TABLET ORAL at 21:04

## 2023-11-01 RX ADMIN — LIDOCAINE 2 PATCH: 560 PATCH PERCUTANEOUS; TOPICAL; TRANSDERMAL at 19:29

## 2023-11-01 RX ADMIN — KETOROLAC TROMETHAMINE 15 MG: 15 INJECTION, SOLUTION INTRAMUSCULAR; INTRAVENOUS at 21:56

## 2023-11-01 RX ADMIN — PREGABALIN 100 MG: 100 CAPSULE ORAL at 19:06

## 2023-11-01 RX ADMIN — NICOTINE 1 PATCH: 21 PATCH, EXTENDED RELEASE TRANSDERMAL at 19:28

## 2023-11-01 RX ADMIN — PROPRANOLOL HYDROCHLORIDE 20 MG: 20 TABLET ORAL at 21:06

## 2023-11-01 RX ADMIN — CLONAZEPAM 2 MG: 0.5 TABLET ORAL at 18:42

## 2023-11-01 RX ADMIN — DESIPRAMINE HYDROCHLORIDE 150 MG: 25 TABLET ORAL at 21:06

## 2023-11-01 RX ADMIN — OLANZAPINE 10 MG: 10 INJECTION, POWDER, FOR SOLUTION INTRAMUSCULAR at 18:56

## 2023-11-01 RX ADMIN — IBUPROFEN 600 MG: 600 TABLET ORAL at 17:48

## 2023-11-01 RX ADMIN — BENZTROPINE MESYLATE 1 MG: 1 TABLET ORAL at 21:05

## 2023-11-01 RX ADMIN — BUSPIRONE HYDROCHLORIDE 15 MG: 15 TABLET ORAL at 21:06

## 2023-11-01 RX ADMIN — ACETAMINOPHEN 1000 MG: 500 TABLET ORAL at 18:32

## 2023-11-01 ASSESSMENT — ACTIVITIES OF DAILY LIVING (ADL)
ADLS_ACUITY_SCORE: 39
ADLS_ACUITY_SCORE: 35
ADLS_ACUITY_SCORE: 39

## 2023-11-01 ASSESSMENT — COLUMBIA-SUICIDE SEVERITY RATING SCALE - C-SSRS
3. HAVE YOU BEEN THINKING ABOUT HOW YOU MIGHT KILL YOURSELF?: YES
ATTEMPT LIFETIME: YES
2. HAVE YOU ACTUALLY HAD ANY THOUGHTS OF KILLING YOURSELF?: YES
TOTAL  NUMBER OF ABORTED OR SELF INTERRUPTED ATTEMPTS LIFETIME: YES
1. IN THE PAST MONTH, HAVE YOU WISHED YOU WERE DEAD OR WISHED YOU COULD GO TO SLEEP AND NOT WAKE UP?: YES
5. HAVE YOU STARTED TO WORK OUT OR WORKED OUT THE DETAILS OF HOW TO KILL YOURSELF? DO YOU INTEND TO CARRY OUT THIS PLAN?: YES
ATTEMPT PAST THREE MONTHS: NO
LETHALITY/MEDICAL DAMAGE CODE MOST LETHAL POTENTIAL ATTEMPT: BEHAVIOR LIKELY TO RESULT IN INJURY BUT NOT LIKELY TO CAUSE DEATH
TOTAL  NUMBER OF ACTUAL ATTEMPTS LIFETIME: 3
LETHALITY/MEDICAL DAMAGE CODE MOST RECENT POTENTIAL ATTEMPT: BEHAVIOR LIKELY TO RESULT IN INJURY BUT NOT LIKELY TO CAUSE DEATH
TOTAL  NUMBER OF ABORTED OR SELF INTERRUPTED ATTEMPTS PAST 3 MONTHS: NO
2. HAVE YOU ACTUALLY HAD ANY THOUGHTS OF KILLING YOURSELF?: SI
1. HAVE YOU WISHED YOU WERE DEAD OR WISHED YOU COULD GO TO SLEEP AND NOT WAKE UP?: YES
4. HAVE YOU HAD THESE THOUGHTS AND HAD SOME INTENTION OF ACTING ON THEM?: YES
2. HAVE YOU ACTUALLY HAD ANY THOUGHTS OF KILLING YOURSELF?: TODAY
REASONS FOR IDEATION LIFETIME: MOSTLY TO END OR STOP THE PAIN (YOU COULDN'T GO ON LIVING WITH THE PAIN OR HOW YOU WERE FEELING)
4. HAVE YOU HAD THESE THOUGHTS AND HAD SOME INTENTION OF ACTING ON THEM?: YES
TOTAL  NUMBER OF INTERRUPTED ATTEMPTS LIFETIME: NO
6. HAVE YOU EVER DONE ANYTHING, STARTED TO DO ANYTHING, OR PREPARED TO DO ANYTHING TO END YOUR LIFE?: NO
TOTAL  NUMBER OF ABORTED OR SELF INTERRUPTED ATTEMPTS LIFETIME: 2
REASONS FOR IDEATION PAST MONTH: MOSTLY TO END OR STOP THE PAIN (YOU COULDN'T GO ON LIVING WITH THE PAIN OR HOW YOU WERE FEELING)
1. IN THE PAST MONTH, HAVE YOU WISHED YOU WERE DEAD OR WISHED YOU COULD GO TO SLEEP AND NOT WAKE UP?: TODAY
5. HAVE YOU STARTED TO WORK OUT OR WORKED OUT THE DETAILS OF HOW TO KILL YOURSELF? DO YOU INTEND TO CARRY OUT THIS PLAN?: YES
2. HAVE YOU ACTUALLY HAD ANY THOUGHTS OF KILLING YOURSELF?: YES
LETHALITY/MEDICAL DAMAGE CODE MOST RECENT ACTUAL ATTEMPT: MODERATE PHYSICAL DAMAGE, MEDICAL ATTENTION NEEDED
LETHALITY/MEDICAL DAMAGE CODE MOST LETHAL ACTUAL ATTEMPT: MODERATE PHYSICAL DAMAGE, MEDICAL ATTENTION NEEDED

## 2023-11-01 NOTE — ED NOTES
IP MH Referral Acuity Rating Score (RARS)    LMHP complete at referral to IP MH, with DEC; and, daily while awaiting IP MH placement. Call score to PPS.  CRITERIA SCORING   New 70 HH and Involuntary for IP MH (not adolescent) 0/1   Boarding over 24 hours 0/1   Vulnerable adult at least 55+ with multiple co morbidities; or, Patient age 11 or under 0/1   Suicide ideation without relief of precipitating factors 1/1   Current plan for suicide 1/1   Current plan for homicide 0/1   Imminent risk or actual attempt to seriously harm another without relief of factors precipitating the attempt 1/1   Severe dysfunction in daily living (ex: complete neglect for self care, extreme disruption in vegetative function, extreme deterioration in social interactions) 1/1   Recent (last 2 weeks) or current physical aggression in the ED 0/1   Restraints or seclusion episode in ED 0/1   Verbal aggression, agitation, yelling, etc., while in the ED 0/1   Active psychosis with psychomotor agitation or catatonia 0/1   Need for constant or near constant redirection (from leaving, from others, etc).  0/1   Intrusive or disruptive behaviors 0/1   TOTAL Acuity Total Score: 3
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
## 2023-11-01 NOTE — ED NOTES
This RN spoke to the patient about her plan of care. Patient was told for pain we will be doing Tylenol and ibuprofen and we could offer her home medication. Patient requesting to go outside smoke. Patient was educated that the ER is a closed unit and patients can not go outside and smoke. Patient was offered nicotine patches/gum. Patient became verbally aggressive, flipped the bed over, and picked up the stool and threw it at the wall. Code 21 called. Patient given 10mg of Zyprexa. Continuous 1:1 monitoring. Patient did not require restraints at this time.
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
## 2023-11-01 NOTE — ED TRIAGE NOTES
Pt BIBA with c/o BLE swelling. Pt endorses her urine looking darker than normal. Pt also suicidal with no plan.
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
## 2023-11-02 ENCOUNTER — TELEPHONE (OUTPATIENT)
Dept: BEHAVIORAL HEALTH | Facility: CLINIC | Age: 28
End: 2023-11-02
Payer: MEDICAID

## 2023-11-02 VITALS
OXYGEN SATURATION: 97 % | RESPIRATION RATE: 18 BRPM | HEART RATE: 92 BPM | SYSTOLIC BLOOD PRESSURE: 126 MMHG | DIASTOLIC BLOOD PRESSURE: 89 MMHG | TEMPERATURE: 98.1 F

## 2023-11-02 LAB
ATRIAL RATE - MUSE: 67 BPM
DIASTOLIC BLOOD PRESSURE - MUSE: NORMAL MMHG
INTERPRETATION ECG - MUSE: NORMAL
P AXIS - MUSE: 40 DEGREES
PR INTERVAL - MUSE: 142 MS
QRS DURATION - MUSE: 94 MS
QT - MUSE: 394 MS
QTC - MUSE: 416 MS
R AXIS - MUSE: 62 DEGREES
SYSTOLIC BLOOD PRESSURE - MUSE: NORMAL MMHG
T AXIS - MUSE: 32 DEGREES
VENTRICULAR RATE- MUSE: 67 BPM

## 2023-11-02 PROCEDURE — 250N000013 HC RX MED GY IP 250 OP 250 PS 637: Performed by: EMERGENCY MEDICINE

## 2023-11-02 PROCEDURE — 99245 OFF/OP CONSLTJ NEW/EST HI 55: CPT | Performed by: CLINICAL NURSE SPECIALIST

## 2023-11-02 PROCEDURE — 99417 PROLNG OP E/M EACH 15 MIN: CPT | Performed by: CLINICAL NURSE SPECIALIST

## 2023-11-02 PROCEDURE — 93005 ELECTROCARDIOGRAM TRACING: CPT

## 2023-11-02 PROCEDURE — 99285 EMERGENCY DEPT VISIT HI MDM: CPT | Mod: 25

## 2023-11-02 RX ORDER — CLONAZEPAM 0.5 MG/1
2 TABLET ORAL ONCE
Status: COMPLETED | OUTPATIENT
Start: 2023-11-02 | End: 2023-11-02

## 2023-11-02 RX ORDER — BUPRENORPHINE HYDROCHLORIDE AND NALOXONE HYDROCHLORIDE DIHYDRATE 8; 2 MG/1; MG/1
1 TABLET SUBLINGUAL 2 TIMES DAILY
Qty: 14 TABLET | Refills: 0 | Status: SHIPPED | OUTPATIENT
Start: 2023-11-02 | End: 2023-11-06

## 2023-11-02 RX ORDER — CYCLOBENZAPRINE HCL 10 MG
10 TABLET ORAL 3 TIMES DAILY
Status: DISCONTINUED | OUTPATIENT
Start: 2023-11-02 | End: 2023-11-02 | Stop reason: HOSPADM

## 2023-11-02 RX ORDER — BUPRENORPHINE HYDROCHLORIDE AND NALOXONE HYDROCHLORIDE DIHYDRATE 8; 2 MG/1; MG/1
1 TABLET SUBLINGUAL 2 TIMES DAILY
Status: DISCONTINUED | OUTPATIENT
Start: 2023-11-02 | End: 2023-11-02 | Stop reason: HOSPADM

## 2023-11-02 RX ADMIN — BUPRENORPHINE AND NALOXONE 1 TABLET: 8; 2 TABLET SUBLINGUAL at 13:07

## 2023-11-02 RX ADMIN — CYCLOBENZAPRINE HYDROCHLORIDE 10 MG: 5 TABLET, FILM COATED ORAL at 13:07

## 2023-11-02 RX ADMIN — BUSPIRONE HYDROCHLORIDE 15 MG: 15 TABLET ORAL at 09:06

## 2023-11-02 RX ADMIN — CLONAZEPAM 2 MG: 0.5 TABLET ORAL at 01:36

## 2023-11-02 RX ADMIN — CLONAZEPAM 2 MG: 0.5 TABLET ORAL at 11:50

## 2023-11-02 RX ADMIN — BUSPIRONE HYDROCHLORIDE 15 MG: 15 TABLET ORAL at 13:07

## 2023-11-02 RX ADMIN — PROPRANOLOL HYDROCHLORIDE 20 MG: 20 TABLET ORAL at 09:06

## 2023-11-02 RX ADMIN — PREGABALIN 100 MG: 100 CAPSULE ORAL at 09:05

## 2023-11-02 RX ADMIN — NICOTINE 1 PATCH: 21 PATCH, EXTENDED RELEASE TRANSDERMAL at 09:07

## 2023-11-02 ASSESSMENT — COLUMBIA-SUICIDE SEVERITY RATING SCALE - C-SSRS
1. SINCE LAST CONTACT, HAVE YOU WISHED YOU WERE DEAD OR WISHED YOU COULD GO TO SLEEP AND NOT WAKE UP?: NO
TOTAL  NUMBER OF ABORTED OR SELF INTERRUPTED ATTEMPTS SINCE LAST CONTACT: NO
6. HAVE YOU EVER DONE ANYTHING, STARTED TO DO ANYTHING, OR PREPARED TO DO ANYTHING TO END YOUR LIFE?: NO
ATTEMPT SINCE LAST CONTACT: NO
TOTAL  NUMBER OF INTERRUPTED ATTEMPTS SINCE LAST CONTACT: NO
2. HAVE YOU ACTUALLY HAD ANY THOUGHTS OF KILLING YOURSELF?: NO
SUICIDE, SINCE LAST CONTACT: NO

## 2023-11-02 ASSESSMENT — ACTIVITIES OF DAILY LIVING (ADL)
ADLS_ACUITY_SCORE: 39

## 2023-11-02 NOTE — PROGRESS NOTES
Triage & Transition Services, Extended Care     Ileana Thorne  November 2, 2023    Vanna is followed related to Placement delay: boarding for IP MH Placement . Please see initial [facility] Crisis Assessment completed for complete assessment information. Medical record is reviewed. While patient is in the ED, care team is working towards Learn and Demonstrate at [employer] Skill Focused on Crisis Stabilization.     There are not significant status changes.     10:45 AM writer called [facility] ED to meet with patient however was informed the patient will be moving to the [facility] and was asked to wait to meet with patient until she moved there.    12:14 PM writer called back to [facility] ED to meet with patient as she had not moved to the [facility] however was again told that she would be moving shortly, and call back when she was there.    1:49 PM writer called back to the [facility] ED to meet with patient as he was informed that should be moving to the [facility] between 12:30 PM and 1 PM, writer was informed that the patient was just leaving in an ambulance.    3:56 PM writer called [facility] to see if they will be a good time for them to meet with patient however was informed that there is currently an  with patient, likely referring to a psych consult that was also scheduled today.  Due to this writer asked a colleague to meet with patien later in the day.    Extended Care will follow and meet with patient/family/care team as able or requested.     Noah Birch  [facility], Extended Care   [phone]

## 2023-11-02 NOTE — PROGRESS NOTES
"Triage & Transition Services, Extended Care     Therapy Progress Note & Reassessment    Patient: Vanna goes by \"Vanna,\" uses she/her pronouns  Date of Service: November 2, 2023  Site of Service: [facility]  Patient was seen in-person.     Presenting problem:   Vanna is followed related to 72 Hour Hold: expiring 11/6/2023 at 1900, long wait time for admission and patient requesting to discharge. Please see initial [facility] Crisis Assessment completed by Shani Blackmon on 11/1/2023 for complete assessment information. Notable concerns include SI with plan to overdose or cut her wrists.     Individuals Present: Vanna & ADAN Arias    Session start: 5:51pm  Session end: 6:09pm  Session duration in minutes: 18  Session number: 1  Anticipated number of sessions or this episode of care: 1  CPT utilized: 18824 - Psychotherapy (with patient) - 30 (16-37*) min    Current Presentation:   Patient was resting but arousable to check-in. Patient reported to writer that she was feeling ready to discharge. Patient denies current passive or active SI, intent or plans. Patient also denies HI. Patient reports prior to arriving in the ED, she had been \"off my medication for a few weeks\" and believes this was a contributing factor to increased depressive symptoms. Patient reports she does not currently have a medication organizer or funds to access a medication organizer, however, she reports being able to ask her \"mom\" for assistance in getting one. Patient reports confidence in being able to take her medications as prescribed moving forward and reports having current access to her prescribed medications via her PCP. Patient reports engaging with [facility] to find OP therapy and psychiatry. She indicated her friend goes to and recommended \"[facility]\", and noted \"that's the actual name\" of the agency. Patient reports interest in doing EMDR to address \"my  trauma\" and reports she would be able to engage with EMDR " "at this specific clinic. Patient reports having an appointment at this clinic to establish care and believes it is on Tuesday (11/7/2023). Additionally, patient reports belief that starting \"suboxone\" has been useful in improving her symptoms.    Patient was able to engage in safety planning and described utilizing the following activities to cope or offer her a distraction should mental health symptoms increase: \"cooking, cleaning, art with gel pens, sermons, tv, baths and facials\". Patient also noted her \"Orthodox domingo\" as important to her. Patient described having the support of her \"mom\" and \"friend Marbella\" who she can reach out to for additional support. Additionally, patient reports having a \"\" through \"[facility]\" who can \"make referrals\" if needed for OP mental health services. Patient identified that if depressive symptoms increase and SI with plan return, patient could use the crisis text line (noting she previously had a singular negative experience when calling and would be less likely to utilize the call feature), calling friends or providers, calling 911 and coming to the ED. Patient was future-oriented and described excitement for starting a \"new job next week\". Patient reports she has a meeting next week to seek employment and reports hope that she could \"be an advocate\".       Risk Assessment:  [place] Suicide Severity Rating Scale Since Last Contact: 11/2/2023  Suicidal Ideation (Since Last Contact)  1. Wish to be Dead (Since Last Contact): No  2. Non-Specific Active Suicidal Thoughts (Since Last Contact): No  Suicidal Behavior (Since Last Contact)  Actual Attempt (Since Last Contact): No  Has subject engaged in non-suicidal self-injurious behavior? (Since Last Contact): No  Interrupted Attempts (Since Last Contact): No  Aborted or Self-Interrupted Attempt (Since Last Contact): No  Preparatory Acts or Behavior (Since Last Contact): No  Suicide (Since Last Contact): " No  Actual/Potential Lethality (Most Lethal Attempt)  Most Lethal Attempt Date:  (a year ago)  Actual Lethality/Medical Damage Code (Most Lethal Attempt): Moderate physical damage, medical attention needed  Potential Lethality Code (Most Lethal Attempt): Behavior likely to result in injury but not likely to cause death  C-SSRS Risk (Since Last Contact)  Calculated C-SSRS Risk Score (Since Last Contact): No Risk Indicated    Mental Status Exam:   Appearance: awake, alert  Attitude: cooperative  Eye Contact: good  Mood: better and good  Affect: mood congruent and intensity is flat  Speech: clear, coherent  Psychomotor Behavior: no evidence of tardive dyskinesia, dystonia, or tics  Thought Process:  logical, goal oriented, and circumstantial  Associations: no loose associations  Thought Content: no evidence of suicidal ideation or homicidal ideation  Insight: good  Judgement: fair  Oriented to: time, person, and place  Attention Span and Concentration: intact  Recent and Remote Memory: intact    Diagnosis:   F60.3 - Borderline personality disorder  F33.2 - Major depressive disorder, Recurrent episode, Severe  F41.1 - Generalized anxiety disorder  F43.10 - Post-traumatic stress disorder    Therapeutic Intervention(s):   Provided active listening, unconditional positive regard, and validation. Engaged in safety planning.  Identified stress relief practices.    Treatment Objective(s) Addressed:   The focus of this session was on rapport building, orienting the patient to therapy, safety planning, identifying an appropriate aftercare plan, assessing safety, identifying additional supports, and exploring obstacles to safety in the community.     Plan:   Discharge: Patient denies current passive and active SI, intent or plans. Patient also denies HI. Patient was able to engage in safety planning and requested to discharge home. Patient was seen by psychiatry provider LACY Stoll, CNP who offered medication  consultation and recommended discharge home. Patient is being recommended for discharge home with OP services.     Plan for Care reviewed with Assigned Medical Provider? Yes. Provider, Dr. Rachel, response: in agreement.     Mariel Dickens REID   Licensed Mental Health Professional (LMHP), [facility]  [phone]

## 2023-11-02 NOTE — ED PROVIDER NOTES
[facility] ED Mental Health Handoff Note:       Brief HPI:  This is a 28 year old female signed out to me by Dr. Juárez.  See initial ED Provider note for full details of the presentation. Interval history is pertinent for ongoing ED boarding. Patient this evening reports feeling much improved, citing reason of being back on her meds and also stabilized from her opiate withdrawal with Suboxone. She would like to discharge. Psych consultation and Extended Care DEC both feel she can be discharged.    Home meds reviewed and ordered/administered: Yes    Medically stable for inpatient mental health admission: Yes.    Evaluated by mental health: Yes. The recommendation is for outpatient mental health treatment. Resources and plan given to patient.    Safety concerns: At the time I received sign out, there were no safety concerns.    Hold Status:  Active Orders   N/A       Exam:   Patient Vitals for the past 24 hrs:   BP Pulse Resp SpO2   11/02/23 0800 (!) 135/99 71 16 98 %   11/01/23 2108 131/84 80 16 96 %         ED Course:    Medications   busPIRone (BUSPAR) tablet 15 mg (15 mg Oral $Given 11/2/23 1307)   clonazePAM (klonoPIN) tablet 2 mg (2 mg Oral $Given 11/2/23 0136)   desipramine (NORPRAMIN) tablet 150 mg (150 mg Oral $Given 11/1/23 2106)   prazosin (MINIPRESS) capsule 2 mg (has no administration in time range)   pregabalin (LYRICA) capsule 100 mg (100 mg Oral $Given 11/2/23 0905)   propranolol (INDERAL) tablet 20 mg (20 mg Oral $Given 11/2/23 0906)   nicotine Patch in Place ( Transdermal Patch in Place 11/2/23 1735)   nicotine (NICODERM CQ) 21 MG/24HR 24 hr patch 1 patch (1 patch Transdermal $Patch/Med Applied 11/2/23 0907)   acetaminophen (TYLENOL) tablet 650 mg (650 mg Oral Not Given 11/2/23 1219)   ibuprofen (ADVIL/MOTRIN) tablet 600 mg (has no administration in time range)   cyclobenzaprine (FLEXERIL) tablet 10 mg (10 mg Oral $Given 11/2/23 1307)   buprenorphine-naloxone (SUBOXONE) 8-2 MG  sublingual tablet 1 tablet (1 tablet Sublingual $Given 11/2/23 1307)   ibuprofen (ADVIL/MOTRIN) tablet 600 mg (600 mg Oral $Given 11/1/23 1748)   acetaminophen (TYLENOL) tablet 1,000 mg (1,000 mg Oral $Given 11/1/23 1832)   Lidocaine (LIDOCARE) 4 % Patch 2 patch (2 patches Transdermal $Patch/Med Applied 11/1/23 1929)   OLANZapine (zyPREXA) injection 10 mg (10 mg Intramuscular $Given 11/1/23 1856)   ketorolac (TORADOL) injection 15 mg (15 mg Intravenous $Given 11/1/23 2156)   clonazePAM (klonoPIN) tablet 2 mg (2 mg Oral $Given 11/2/23 1150)            There were no significant events during my shift.      Impression:    ICD-10-CM    1. Uncomplicated opioid dependence (H)  F11.20 buprenorphine-naloxone (SUBOXONE) 8-2 MG SUBL sublingual tablet      2. Suicidal ideation  R45.851       3. Swelling of both lower extremities  M79.89           Plan:    Discharged.  Psych consult provided 7 days Suboxone supply and referred her [facility] the [facility].      RESULTS:   No results found for this visit on 11/01/23 (from the past 24 hour(s)).          MD Wilson Moss, Sarbjit Reyes MD  11/02/23 1867

## 2023-11-02 NOTE — TELEPHONE ENCOUNTER
8:21 AM: Beds are available [facility]. They are unable to review Pt d/t current aggression.    8:28 AM: Intake presented Pt to Resource RN Annika at [facility].    8:46 AM: Pt labs, 72HH, ED notes and face sheet.    9:48 AM: Intake was informed that there are no appropriate beds for Pt at this time.    2:01 PM: Intake provided MRN to Nga for review for station 10.    2:21 PM: Provider is requesting reassessment. It appears Pt has secondary gain related to pain and controlled meds. Pt has requested to discharge and would benefit from another assessment to see if placement is appropriate.    3:25 PM: Writer called [facility] for current location and informed that Pt is no longer in the ED.     3:27 PM: Intake called [facility] to request assessment. CRN not available. Intake to call after report.    R: [place]  Access Inpatient [place] Call Log 11/2/2023 @7:14 am    Intake has called facilities that have not updated the bed status within the last 12 hours.                               [facility] is posting 0 beds.           [facility] is posting 0 beds. [phone]. Per call at 7:14 am to [facility], they are at cap.   [facility] is posting 0 beds. Negative covid required.                 [facility] is posting 0 bed. Negative covid required. [phone]; per call at 7:17 am to Boo, he will ask charge RN to call us back    [facility] is posting 0 bed. [phone]   [facility] is posting 0 beds. [phone].    [facility] is posting 0 beds           [facility] ([facility]) is posting 0 beds.       [facility] is posting 0 beds. LOW acuity ONLY. Mixed unit 12+. Negative covid- [phone].     [facility] has 1 bed posted. No aggression. Negative Covid. Low acuity.      [facility] is posting 0 beds. Negative covid. [phone] per call at 7:22 am, vm said NOT to leave a message; no option to speak with a live rep.    [facility] ([place]) is posting 2 beds. Low acuity  only. Negative covid.  [phone].    [facility] is posting 1 bed. Low acuity. No current aggression.     [facility] ([place]) is posting 0 beds available. Negative covid.  [phone].       [facility] is posting 0 beds. Low acuity. 72 HH hold preferred. Negative covid required. [phone]. Per call at 7:26 am to [facility], they are at cap for the day.    [facility] ([place]) is posting 0 beds. Low acuity only. Negative covid.  [phone].       [facility] [place] is posting 3 beds.  Negative covid required.   Vol only, No history of aggression, violence, or assault. No sexual offenders. No 72 HH holds. [phone].         [facility] is posting  2 beds. Negative covid required.  (Must have the cognitive ability to do programming. No aggressive or violent behavior or recent HX in the last 2 yrs. MH must be primary.) Always low acuity. [phone] per call at 7:28 am to Torri, they have 2 beds.    [facility] has 0 beds posted. Negative covid required.  Low acuity only. Violence and aggression capped.  [phone]    [facility] is posting 2 beds. Low acuity, Negative covid required. [phone] per call at 7:32 am to [facility], they are at cap.   [facility] is posting 2 beds. Unit is a combined unit (14+). No aggressive patients. Voluntary only. Must be accompanied by a guardian.  Negative covid. [phone].  per call at 7:37 am to Nico, they no longer take adol's. They have 2 adult beds avail.    [facility] posting 1 bed. Negative covid required.  [phone]    [facility] [place] is posting 3 beds. Negative covid. LOW acuity. (No lines, drains, or tubes, oxygen, CPAP, IV, etc.). [phone] per call at 7:41 am to Rubi, they have 1 bed avail.      [facility] ([facility]) is posting 1 bed. Negative covid. (No. lines, drains, or tubes, oxygen, CPAP, IV,  etc.). [phone]          Pt remains on the work list pending appropriate bed availability.

## 2023-11-02 NOTE — TELEPHONE ENCOUNTER
Called ED @ 03:07 to request Covid swab for outside hospitals to review    R: [place]patient [place] Call [place]  11/2/2023 1:06 AM  Intake has called facilities that have not updated their bed status within the last 12 hours.??      ADULTS:     *[place]  [place] -- [facility]: @ cap per website.  [place] -- [facility]:  @ cap per website.  [place] -- [facility]: @cap per website  [place] -- [facility]: @ cap per website.  [place] -- [facility]: @cap per website.  [facility] -- [facility]: @ cap per website.  [place] -- [facility] Posting 1 Bed. Pt does not meet age requirement  [place] -- [facility]: @ cap per website.  [place] -- [facility]: @ cap per website.  [place] -- [facility]:  @ cap per website.     *[facility]: @cap per website. Mixed unit/Low acuity only.   [facility] [place]: Posting 1 bed. Low acuity, No aggression. Per Sheila @ 01:44, they are full until at least 10AM  [facility]: @ cap per website   [facility]:  Posting 1 beds. Low acuity only. No current aggression. Per Sheila @ 01:44, they are full until at least 10AM  [facility]:  Posting 2 beds. COVID negative test req. Lower acuity only. Requires Negative Covid  [facility]: @ cap per website. Low acuity only. Prefer med adjustments placement.  [facility]: Posting 3 beds. No aggression  [place] -- [facility]: Posting 2 bed. NO reviews after 10PM. Low acuity only.   [place] [facility]: Posting 3 bed. No hx of aggression. No sexual offenders. Voluntary patients only. Meets exclusionary d/t 72 [facility] -- [facility]:  Posting 2 beds. Low acuity only. Must have the cognitive ability to do programming. No aggressive or violent behavior or recent HX in the last 2 yrs. MH must be primary. Pt not appropriate d/t acuity/aggressive behaviors in ED  [place] -- [facility]: @ Cap per website.  COVID negative test. Must be low  acuity ONLY.  [place] -- [facility]: Posting 2 beds. Low acuity. Negative Covid.   [place] -- [facility]: @ cap per website. Covid neg. Voluntary only. Mixed unit of adults & adol. No aggressive or violent behavior. No registered sex offenders.   [place] -- [place]: Posting 3 beds. COVID negative test. Per Jan @ 02:47, only [place] beds available  [facility]: Posting 3 beds. No hx of aggression/assault. No lines, drains or tubes. Does not provide detox or CD treatment. Pt not appropriate d/t acuity/aggressive behaviors in [place] -- [facility]: Posting 4 beds. Out-of-State Facility. Pt is on a [place] 72 HH; cannot be placed in [facility] -- [facility]: @ cap per website Mixed unit/Low acuity/no medical devices - IV, CPAP etc.     Pt remains on waitlist pending appropriate placement availability.

## 2023-11-02 NOTE — ED NOTES
IP MH Referral Acuity Rating Score (RARS)    LMHP complete at referral to IP MH, with DEC; and, daily while awaiting IP MH placement. Call score to PPS.  CRITERIA SCORING   New 72 HH and Involuntary for IP MH (not adolescent) 1/1   Boarding over 24 hours 0/1   Vulnerable adult at least 55+ with multiple co morbidities; or, Patient age 11 or under 0/1   Suicide ideation without relief of precipitating factors 1/1   Current plan for suicide 1/1   Current plan for homicide 0/1   Imminent risk or actual attempt to seriously harm another without relief of factors precipitating the attempt 0/1   Severe dysfunction in daily living (ex: complete neglect for self care, extreme disruption in vegetative function, extreme deterioration in social interactions) 1/1   Recent (last 2 weeks) or current physical aggression in the ED 1/1   Restraints or seclusion episode in ED 0/1   Verbal aggression, agitation, yelling, etc., while in the ED 1/1   Active psychosis with psychomotor agitation or catatonia 0/1   Need for constant or near constant redirection (from leaving, from others, etc).  0/1   Intrusive or disruptive behaviors 1/1   TOTAL Acuity Total Score: 7

## 2023-11-02 NOTE — DISCHARGE INSTRUCTIONS
Psychiatry Medication Recommendations:  - Discontinue Abilify and Benztropine        - monitor to see if over the next several days, that feeling of restlessness and discomfort in skin starts to go away         - monitor for sustained mood instability  - Limit the use of Klonopin as much as possible, 3x/week or less    Follow-up Resources:  [facility] (downstairs)  Hours: Monday to Friday 9am - 11:30am, 12:30pm to 3pm        Aftercare Plan  It is recommended that you follow up with your established providers (psychiatrist, mental health therapist, and/or primary care doctor - as relevant) as soon as possible. Coordinators from [facility] will be calling you in the next 24-48 hours to ensure that you have the resources you need. You can also contact [facility] coordinators directly at [phone].     If I am feeling unsafe or I am in a crisis, I will:   Contact my established care providers  Crisis Text Line: Text 565640  [facility] Crisis Line: [phone]  Call the National Suicide Prevention Lifeline: 768  Go to the nearest emergency room   Call 411     Warning signs that I or other people might notice when a crisis is developing for me:  Changes in sleep  Changes in appetite  Increased depressive symptom  Withdrawing from others  Increased substance use    Things I am able to do on my own to cope or help me feel better:   Go on a walk  Get outside  Watch your favorite movie or tv show  Journal  Read a book  Cooking  Cleaning  Arts & crafts  Listen to sermons  Take a bath  Do facials    Things that I am able to do with others to cope or help me better:   Call a friend, family member or other support  Play a game  Grab a cup of coffee, tea or favorite non-alcoholic beverage  Hangout with friend(s)  Engage with domingo community    Changes I can make to support my mental health and wellness:   Engage with my outpatient providers  Finish scheduling outpatient mental health services through Mental Health  Counselors in [facility]  Take my medications as prescribed  Get medication organizer, as financially able  Abstain from substances  Engage in 3 self care activities per day    People in my life that I can ask for help:   Friends   Family  Outpatient Providers  Crisis line    Outpatient providers    [facility] has a mental health crisis team you can call 24/7: [facility] Crisis Line: [phone]    Per Psychiatry, Criteria for Premenstrual Dysphoric Disorder:  A. In the majority of menstrual cycles, at least five symptoms must be present in the final week before the onset of menses, start to improve within a few days after the onset of menses, and become minimal or absent in the week postmenses.  B. One (or more) of the following symptoms may be present:   1. Marked affective lability (e.g., mood swings; feeling suddenly sad or tearful or increased sensitivity to rejection).   2. Marked irritability or anger or increased interpersonal conflicts.   3. Marked depressed mood, feelings of hopelessness, or self-deprecating thoughts.   4. Marked anxiety, tension, and/or feelings of being keyed up or on edge.  C. One (or more) of the following symptoms must additionally be present, to reach a total of five symptoms when combined with symptoms from Criterion B above:   1. Decreased interest in usual activities (e.g., work, school, friends, hobbies).   2. Subjective difficulty in concentration.   3. Lethargy, easy fatigability, or marked lack of energy.   4. Marked change in appetite; overeating; or specific food cravings.   5. Hypersomnia or insomnia.   6. A sense of being overwhelmed or out of control.   7. Physical symptoms such as breast tenderness or swelling, joint or muscle pain, a sensation of  bloating,  or weight gain.  Note: The symptoms in Criteria A-C must have been met for most menstrual cycles that occurred in the preceding year.  D. The symptoms are associated with clinically significant  distress or interferences with work, school, usual social activities or relationships with others (e.g., avoidance of social activities, decreased productivity and efficiency at work, school or home).  E. The disturbance is not merely an exacerbation of the symptoms of another disorder, such as major depressive disorder, panic disorder, persistent depressive disorder (dysthymia), or a personality disorder (although it may co-occur with any of these disorders).  F. Criterion A should be confirmed by prospective daily ratings during at least two symptomatic cycles.  G. The symptoms are not attributable to the physiological effects of a substance (e.g., a drug of abuse, a medication, other treatment) or another medical condition (e.g., hyperthyroidism).    Additional resources and information:   The following DBT skills can assist me when: I want to act on your emotions and acting on them will only make things worse, I am overwhelmed by my emotions, I want to try to be skillful and not act on self destructive behavior.     Reduce Extreme Emotion  QUICKLY:  Changing Your Body Chemistry       T:  Change your body Temperature to change your autonomic nervous system    Use Ice pack to calm yourself down FAST. Place ice pack underneath your eyes for a count of 30 seconds to initiate the divers reflex which will naturally calm down your heart rate and breathing.      I:  Intensely exercise to calm down a body revved up by emotion    Examples: running, walking fast, jumping, playing basketball, weight lifting, swimming, calisthenics, etc.      Engage in exercises that DO NOT include violent behaviors. Exercises that utilize violent behaviors tend to function as  behavioral rehearsal,  and rather than calming the person down, may actually  rev  the person up more, increasing the likelihood of violence, and lessening the likelihood that they will  burn off  energy     P:  Progressively relax your muscles    Starting with your  hands, moving to your forearms, upper arms, shoulders, neck, forehead, eyes, cheeks and lips, tongue and teeth, chest, upper back, stomach, buttocks, thighs, calves, ankles, feet      Tense (10 seconds,   of the way), then relax each muscle (all the way)    Notice the tension    Notice the difference when relaxed (by tensing first, and then relaxing, you are able to get a more thorough relaxation than by simply relaxing)      P: Paced breathing to relax    The standard technique is to begin with counting the number of steps one takes for a typical inhale, then counting the steps one takes for a typical exhale, and then lengthening the amount of steps for the exhalation by one or two steps.  OR repeat this pattern for 1-2 minutes:   Inhale for four (4) seconds    Exhale for six (6) to eight (8) seconds        After using Distress Tolerance TIPP, TRY TO STOP!     S- Stop    Do not just react on your emotion urge. Stop! Freeze! Do not move a muscle! Your emotions may try to make you act without thinking. Stay in control! Take a step back Take a step back from the situation.    T- Take a break    Let go. Take a deep breath. Do not let your feelings make you act impulsively.    O- Observe    Notice what is going on inside and outside you. What is the situation? What are your thoughts and feelings? What are others saying or doing? Does my emotion make sense, is it justified? What is it that my emotions want me to do? Would that be effective?    P- Proceed mindfully    Act with awareness. In deciding what to do, consider your thoughts and feelings, the situation, and other people s thoughts and feelings. Think about your goals. Ask Wise Mind: Which actions will make it better or worse?        If my emotion action urge would not be effective or helpful, practice acting OPPOSITE to the EMOTION ACTION URGE can help reduce the intensity or even change the emotion.   Consider these examples: with FEAR we have the urge to run  away/avoid. OPPOSITE would be to approach it with caution. ANGER we have the urge to attack. OPPOSITE would be to gently avoid or to demonstrate kindness towards it. SADNESS we have the urge to withdraw/isolate. OPPOSITE would be to get self to move and be active physically or socially.        I can help my own emotions by practicing the following to keep my emotional mind healthy and bring positive emotions:     The ABC PLEASE skill is about taking good care of ourselves so that we can take care of others. Also, an important component of DBT is to reduce our vulnerability. When we take good care of ourselves, we are less likely to be vulnerable to disease and emotional crisis.     ABC   A- Accumulate positive emotions by doing things that are pleasant.   B- Build mastery by doing things we enjoy. Whether it is reading, cooking, cleaning, fixing a car, working a cross word puzzle, or playing a musical instrument. Practice these things to  and in time we feel competent.   C- [place] Ahead by rehearsing a plan ahead of time so that we can be prepared to cope skillfully. (Think of what makes situations difficult, and what helps in those situations)      PLEASE   Treat Physical Illness and take medications as prescribed.   Balance eating in order to avoid mood swings.   Avoid mood-Altering substances and have mood control.   Maintain good sleep so you can enjoy your life.   Get exercise to maintain high spirits.     Crisis Lines  Crisis Text Line  Text 401331  You will be connected with a trained live crisis counselor to provide support.    Por rosa, texto  ALAN a 078795 o texto a 442-AYUDAME en WhatsApp    The [employer] (LGBTQ Youth Crisis Line)  7.193.784.3768  text START to 487-869      Community Resources  Fast Tracker  Linking people to mental health and substance use disorder resources  [employer]n.org     [place] Mental Health Warm Line  Peer to peer support  Monday thru Saturday, 12  "pm to 10 pm  113.241.6884 or 4.519.671.2789  Text \"Support\" to 79560    National [place] on Mental Illness (CHARISMA)  711.107.2086 or 1.888.CHARISMA.HELPS      Mental Health Apps  My3  https://myOn The Run Techpp.org/    VirtualHopeBox  https://Arbovax/apps/virtual-hope-box/      Additional Information  Today you were seen by a licensed mental health professional through Triage and Transition services, Behavioral Healthcare Providers ([facility])  for a crisis assessment in the Emergency Department at [facility]. It is recommended that you follow up with your established providers (psychiatrist, mental health therapist, and/or primary care doctor - as relevant) as soon as possible. Coordinators from [facility] will be calling you in the next 24-48 hours to ensure that you have the resources you need. You can also contact [facility] coordinators directly at [phone]. You may have been scheduled for or offered an appointment with a mental health provider. [facility] maintains an extensive network of licensed behavioral health providers to connect patients with the services they need. We do not charge providers a fee to participate in our referral network. We match patients with providers based on a patient's specific needs, insurance coverage, and location. Our first effort will be to refer you to a provider within your care system, and will utilize providers outside your care system as needed.    "

## 2023-11-02 NOTE — TELEPHONE ENCOUNTER
R: 4:32PM- PPS writer following up on DEC Reassessment request from Dr. Montiel.  Per Noah's note, a psych consult is scheduled and Extended Care will follow/meet with Pt.      PPS Writer attempted to call for Noah in [facility] but not able to get a hold of Noah.  Called and left a VM for Noah at the DEC Coordinator line.

## 2023-11-02 NOTE — ED PROVIDER NOTES
[facility] ED Mental Health Handoff Note:       Brief HPI:  This is a 28 year old female signed out to me.  See initial ED Provider note for full details of the presentation. Interval history is pertinent for continued need for inpatient hospitalization..    Home meds reviewed and ordered/administered: Yes    Medically stable for inpatient mental health admission: Yes.    Evaluated by mental health: Yes. The recommendation is for inpatient mental health treatment. Bed search in process    Safety concerns: At the time I received sign out, there were no safety concerns.    Hold Status:  Active Orders   Legal    Emergency Hospitalization Hold (72 Hr Hold)     Frequency: Effective Now     Start Date/Time: 11/01/23 1859      Number of Occurrences: Until Specified           Exam:   Patient Vitals for the past 24 hrs:   BP Pulse Resp SpO2   11/02/23 0800 (!) 135/99 71 16 98 %   11/01/23 2108 131/84 80 16 96 %   11/01/23 1627 (!) 142/95 86 16 95 %           ED Course:    Medications   ARIPiprazole (ABILIFY) tablet 10 mg (10 mg Oral $Given 11/1/23 2104)   benztropine (COGENTIN) tablet 1 mg (1 mg Oral $Given 11/1/23 2105)   busPIRone (BUSPAR) tablet 15 mg (15 mg Oral $Given 11/2/23 1307)   clonazePAM (klonoPIN) tablet 2 mg (2 mg Oral $Given 11/2/23 0136)   desipramine (NORPRAMIN) tablet 150 mg (150 mg Oral $Given 11/1/23 2106)   prazosin (MINIPRESS) capsule 2 mg (has no administration in time range)   pregabalin (LYRICA) capsule 100 mg (100 mg Oral $Given 11/2/23 0905)   propranolol (INDERAL) tablet 20 mg (20 mg Oral $Given 11/2/23 0906)   nicotine Patch in Place ( Transdermal Patch in Place 11/1/23 2012)   nicotine (NICODERM CQ) 21 MG/24HR 24 hr patch 1 patch (1 patch Transdermal $Patch/Med Applied 11/2/23 0907)   acetaminophen (TYLENOL) tablet 650 mg (650 mg Oral Not Given 11/2/23 1219)   ibuprofen (ADVIL/MOTRIN) tablet 600 mg (has no administration in time range)   cyclobenzaprine (FLEXERIL) tablet 10 mg (10 mg Oral  $Given 11/2/23 1307)   buprenorphine-naloxone (SUBOXONE) 8-2 MG sublingual tablet 1 tablet (1 tablet Sublingual $Given 11/2/23 1307)   ibuprofen (ADVIL/MOTRIN) tablet 600 mg (600 mg Oral $Given 11/1/23 1748)   acetaminophen (TYLENOL) tablet 1,000 mg (1,000 mg Oral $Given 11/1/23 1832)   Lidocaine (LIDOCARE) 4 % Patch 2 patch (2 patches Transdermal $Patch/Med Applied 11/1/23 1929)   OLANZapine (zyPREXA) injection 10 mg (10 mg Intramuscular $Given 11/1/23 1856)   ketorolac (TORADOL) injection 15 mg (15 mg Intravenous $Given 11/1/23 2156)   clonazePAM (klonoPIN) tablet 2 mg (2 mg Oral $Given 11/2/23 1150)            There were no significant events during my shift.    Patient was signed out to the oncoming provider, Dr. Urrutia      Impression:    ICD-10-CM    1. Suicidal ideation  R45.851       2. Swelling of both lower extremities  M79.89           Plan:    Awaiting inpatient mental health admission/transfer.            MD Marquita Block Eric Girard, MD  11/02/23 7915

## 2023-11-02 NOTE — TELEPHONE ENCOUNTER
[place]: [place]  Access Inpatient Bed Call Log 11/1/2023 @3:21 PM   Intake has called facilities that have not updated the bed status within the last 12 hours.                               [facility] is at capacity.              [facility] is posting 0 beds. [phone]. Per call @8:41, no beds available   [facility] is posting 0 beds. Negative covid required.                 [facility] is posting 0 bed. Negative covid required. [phone] Per call @8:40, at cap no beds available today.   [facility] is posting 0 bed. [phone]   [facility] is posting 0 beds. [phone].    [facility] is posting 1 beds for Young Adults age 18-26. Call for details. Negative covid.  [phone].    [facility] is posting 0 beds           [facility] is posting 0 beds.
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
## 2023-11-02 NOTE — MEDICATION SCRIBE - ADMISSION MEDICATION HISTORY
Medication Scribe Admission Medication History    Admission medication history is complete. The information provided in this note is only as accurate as the sources available at the time of the update.    Information Source(s): Hospital records and CareEverywhere/SureScripts via in-person    Pertinent Information: Completed medication hx per CareEverywhere and Dispensary report as patient was aggressive with staff. No changes to medication list.     Changes made to PTA medication list:  Added: None  Deleted: None  Changed: None    Medication Affordability:  Not including over the counter (OTC) medications, was there a time in the past 3 months when you did not take your medications as prescribed because of cost?: Unable to Assess    Allergies reviewed with patient and updates made in EHR: no    Medication History Completed By: Chacha Frost 11/2/2023 11:00 AM    PTA Med List   Medication Sig Last Dose    ARIPiprazole (ABILIFY) 10 MG tablet Take 1 tablet (10 mg) by mouth At Bedtime Unknown    benztropine (COGENTIN) 0.5 MG tablet Take 0.5 mg by mouth At Bedtime Unknown    Biotin w/ Vitamins C & E 1250-7.5-7.5 MCG-MG-UNT CHEW Take 2 chew tab by mouth daily Unknown    buprenorphine HCl-naloxone HCl (SUBOXONE) 4-1 MG per film Place 1 Film under the tongue 2 times daily as needed (withdrawal symptoms) Unknown    busPIRone (BUSPAR) 15 MG tablet Take 1 tablet (15 mg) by mouth 3 times daily Unknown    clindamycin (CLEOCIN) 300 MG capsule Take 300 mg by mouth 4 times daily Unknown    clonazePAM (KLONOPIN) 2 MG tablet Take 1 tablet (2 mg) by mouth 2 times daily as needed for anxiety Unknown    cyclobenzaprine (FLEXERIL) 10 MG tablet Take 1 tablet (10 mg) by mouth every 8 hours as needed for muscle spasms (chronic pain) Unknown    desipramine (NORPRAMIN) 150 MG tablet Take 1 tablet (150 mg) by mouth At Bedtime Unknown    Melatonin 10 MG TABS tablet Take 15-20 mg by mouth nightly as needed for sleep Unknown    prazosin  (MINIPRESS) 2 MG capsule Take 3 mg by mouth nightly as needed (night terrors) Unknown    pregabalin (LYRICA) 100 MG capsule Take 1 capsule (100 mg) by mouth 3 times daily Unknown    propranolol (INDERAL) 20 MG tablet Take 2 tablets (40 mg) by mouth 2 times daily Unknown

## 2023-11-03 ENCOUNTER — TELEPHONE (OUTPATIENT)
Dept: EMERGENCY MEDICINE | Facility: CLINIC | Age: 28
End: 2023-11-03
Payer: MEDICAID

## 2023-11-03 LAB
BACTERIA UR CULT: ABNORMAL
BACTERIA UR CULT: ABNORMAL

## 2023-11-03 RX ORDER — CEFPODOXIME PROXETIL 100 MG/1
100 TABLET, FILM COATED ORAL 2 TIMES DAILY
Qty: 10 TABLET | Refills: 0 | Status: SHIPPED | OUTPATIENT
Start: 2023-11-03 | End: 2023-11-06

## 2023-11-03 NOTE — ED NOTES
Patient calm and cooperative. Excellent appetite. Makes needs well known to staff. Patient spent time in milieu this PM shift resting and watching television. Patient denies SI/HI/SIB.
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
## 2023-11-03 NOTE — TELEPHONE ENCOUNTER
"8:08 PM Intake received message from EC \"can be removed from the adult IP worklist. Patient is discharging home with OP services.\" PT removed from [facility] Intake no longer following for placement.  "

## 2023-11-06 ENCOUNTER — TELEPHONE (OUTPATIENT)
Dept: BEHAVIORAL HEALTH | Facility: CLINIC | Age: 28
End: 2023-11-06

## 2023-11-06 ENCOUNTER — OFFICE VISIT (OUTPATIENT)
Dept: BEHAVIORAL HEALTH | Facility: CLINIC | Age: 28
End: 2023-11-06
Payer: MEDICAID

## 2023-11-06 ENCOUNTER — TELEPHONE (OUTPATIENT)
Dept: PSYCHIATRY | Facility: CLINIC | Age: 28
End: 2023-11-06

## 2023-11-06 VITALS — HEART RATE: 98 BPM | DIASTOLIC BLOOD PRESSURE: 83 MMHG | SYSTOLIC BLOOD PRESSURE: 118 MMHG

## 2023-11-06 DIAGNOSIS — F11.20 OPIOID USE DISORDER, SEVERE, DEPENDENCE (H): Primary | ICD-10-CM

## 2023-11-06 DIAGNOSIS — Z30.09 ENCOUNTER FOR COUNSELING REGARDING CONTRACEPTION: ICD-10-CM

## 2023-11-06 DIAGNOSIS — F11.20 OPIOID USE DISORDER, SEVERE, DEPENDENCE (H): ICD-10-CM

## 2023-11-06 DIAGNOSIS — N39.0 URINARY TRACT INFECTION WITHOUT HEMATURIA, SITE UNSPECIFIED: ICD-10-CM

## 2023-11-06 DIAGNOSIS — F41.9 ANXIETY AND DEPRESSION: ICD-10-CM

## 2023-11-06 DIAGNOSIS — F32.A ANXIETY AND DEPRESSION: ICD-10-CM

## 2023-11-06 DIAGNOSIS — Z11.3 SCREEN FOR STD (SEXUALLY TRANSMITTED DISEASE): ICD-10-CM

## 2023-11-06 DIAGNOSIS — F17.200 TOBACCO USE DISORDER: ICD-10-CM

## 2023-11-06 DIAGNOSIS — F43.23 ADJUSTMENT DISORDER WITH MIXED ANXIETY AND DEPRESSED MOOD: Primary | ICD-10-CM

## 2023-11-06 LAB
AMPHETAMINE QUAL URINE POCT: NEGATIVE
BARBITURATE QUAL URINE POCT: NEGATIVE
BENZODIAZEPINE QUAL URINE POCT: NEGATIVE
BUPRENORPHINE QUAL URINE POCT: ABNORMAL
COCAINE QUAL URINE POCT: NEGATIVE
CREATININE QUAL URINE POCT: ABNORMAL
FENTANYL UR QL: ABNORMAL
HCG UR QL: NEGATIVE
INTERNAL QC QUAL URINE POCT: ABNORMAL
MDMA QUAL URINE POCT: NEGATIVE
METHADONE QUAL URINE POCT: NEGATIVE
METHAMPHETAMINE QUAL URINE POCT: NEGATIVE
OPIATE QUAL URINE POCT: NEGATIVE
OXYCODONE QUAL URINE POCT: NEGATIVE
PH QUAL URINE POCT: ABNORMAL
PHENCYCLIDINE QUAL URINE POCT: NEGATIVE
POCT KIT EXPIRATION DATE: ABNORMAL
POCT KIT LOT NUMBER: ABNORMAL
SPECIFIC GRAVITY POCT: 1.02
TEMPERATURE URINE POCT: ABNORMAL
THC QUAL URINE POCT: ABNORMAL

## 2023-11-06 PROCEDURE — 87491 CHLMYD TRACH DNA AMP PROBE: CPT | Performed by: FAMILY MEDICINE

## 2023-11-06 PROCEDURE — H0038 SELF-HELP/PEER SVC PER 15MIN: HCPCS

## 2023-11-06 PROCEDURE — 81025 URINE PREGNANCY TEST: CPT | Performed by: FAMILY MEDICINE

## 2023-11-06 PROCEDURE — 99000 SPECIMEN HANDLING OFFICE-LAB: CPT | Performed by: FAMILY MEDICINE

## 2023-11-06 PROCEDURE — 80307 DRUG TEST PRSMV CHEM ANLYZR: CPT | Performed by: FAMILY MEDICINE

## 2023-11-06 PROCEDURE — 99205 OFFICE O/P NEW HI 60 MIN: CPT | Performed by: FAMILY MEDICINE

## 2023-11-06 PROCEDURE — 87591 N.GONORRHOEAE DNA AMP PROB: CPT | Performed by: FAMILY MEDICINE

## 2023-11-06 PROCEDURE — G0480 DRUG TEST DEF 1-7 CLASSES: HCPCS | Mod: 90 | Performed by: FAMILY MEDICINE

## 2023-11-06 RX ORDER — LIDOCAINE HYDROCHLORIDE 10 MG/ML
2 INJECTION, SOLUTION EPIDURAL; INFILTRATION; INTRACAUDAL; PERINEURAL ONCE
OUTPATIENT
Start: 2023-11-06 | End: 2023-11-06

## 2023-11-06 RX ORDER — MEPERIDINE HYDROCHLORIDE 25 MG/ML
25 INJECTION INTRAMUSCULAR; INTRAVENOUS; SUBCUTANEOUS EVERY 30 MIN PRN
OUTPATIENT
Start: 2023-11-06

## 2023-11-06 RX ORDER — SULFAMETHOXAZOLE/TRIMETHOPRIM 800-160 MG
1 TABLET ORAL 2 TIMES DAILY
Qty: 14 TABLET | Refills: 0 | Status: SHIPPED | OUTPATIENT
Start: 2023-11-06 | End: 2023-12-04

## 2023-11-06 RX ORDER — DIPHENHYDRAMINE HYDROCHLORIDE 50 MG/ML
50 INJECTION INTRAMUSCULAR; INTRAVENOUS
Start: 2023-11-06

## 2023-11-06 RX ORDER — POLYETHYLENE GLYCOL 3350 17 G/17G
1 POWDER, FOR SOLUTION ORAL DAILY
Qty: 578 G | Refills: 11 | Status: SHIPPED | OUTPATIENT
Start: 2023-11-06 | End: 2024-08-21

## 2023-11-06 RX ORDER — METHYLPREDNISOLONE SODIUM SUCCINATE 125 MG/2ML
125 INJECTION, POWDER, LYOPHILIZED, FOR SOLUTION INTRAMUSCULAR; INTRAVENOUS
Start: 2023-11-06

## 2023-11-06 RX ORDER — ONDANSETRON 4 MG/1
4 TABLET, ORALLY DISINTEGRATING ORAL EVERY 8 HOURS PRN
Qty: 15 TABLET | Refills: 0 | Status: SHIPPED | OUTPATIENT
Start: 2023-11-06 | End: 2023-12-04

## 2023-11-06 RX ORDER — ALBUTEROL SULFATE 90 UG/1
1-2 AEROSOL, METERED RESPIRATORY (INHALATION)
Start: 2023-11-06

## 2023-11-06 RX ORDER — BUPRENORPHINE HYDROCHLORIDE AND NALOXONE HYDROCHLORIDE DIHYDRATE 8; 2 MG/1; MG/1
1 TABLET SUBLINGUAL 2 TIMES DAILY
Qty: 30 TABLET | Refills: 0 | Status: SHIPPED | OUTPATIENT
Start: 2023-11-06 | End: 2023-12-07

## 2023-11-06 RX ORDER — ALBUTEROL SULFATE 0.83 MG/ML
2.5 SOLUTION RESPIRATORY (INHALATION)
OUTPATIENT
Start: 2023-11-06

## 2023-11-06 RX ORDER — EPINEPHRINE 1 MG/ML
0.3 INJECTION, SOLUTION, CONCENTRATE INTRAVENOUS EVERY 5 MIN PRN
OUTPATIENT
Start: 2023-11-06

## 2023-11-06 ASSESSMENT — COLUMBIA-SUICIDE SEVERITY RATING SCALE - C-SSRS
6. HAVE YOU EVER DONE ANYTHING, STARTED TO DO ANYTHING, OR PREPARED TO DO ANYTHING TO END YOUR LIFE?: NO
1. IN THE PAST MONTH, HAVE YOU WISHED YOU WERE DEAD OR WISHED YOU COULD GO TO SLEEP AND NOT WAKE UP?: NO
SUICIDE, SINCE LAST CONTACT: NO
MOST LETHAL DATE: 64557
LETHALITY/MEDICAL DAMAGE CODE MOST LETHAL POTENTIAL ATTEMPT: BEHAVIOR LIKELY TO RESULT IN INJURY BUT NOT LIKELY TO CAUSE DEATH
TOTAL  NUMBER OF INTERRUPTED ATTEMPTS SINCE LAST CONTACT: NO
TOTAL  NUMBER OF ABORTED OR SELF INTERRUPTED ATTEMPTS SINCE LAST CONTACT: NO
LETHALITY/MEDICAL DAMAGE CODE MOST LETHAL ACTUAL ATTEMPT: MODERATELY SEVERE PHYSICAL DAMAGE, MEDICAL HOSPITALIZATION AND LIKELY INTENSIVE CARE REQUIRED
2. HAVE YOU ACTUALLY HAD ANY THOUGHTS OF KILLING YOURSELF?: NO
ATTEMPT SINCE LAST CONTACT: NO
1. SINCE LAST CONTACT, HAVE YOU WISHED YOU WERE DEAD OR WISHED YOU COULD GO TO SLEEP AND NOT WAKE UP?: NO

## 2023-11-06 ASSESSMENT — PATIENT HEALTH QUESTIONNAIRE - PHQ9: SUM OF ALL RESPONSES TO PHQ QUESTIONS 1-9: 20

## 2023-11-06 NOTE — PROGRESS NOTES
[facility]    Peer  met with Ileana Thorne in the Recovery Clinic to introduce herself, detail services provided and discuss current status of recovery. Pt appeared alert, oriented and open to feedback during our discussion.     Pt arrives for visit with provider for suboxone.  PRC sees patient today under provider diagnosis of opioid substance disorder, severe, dependence  (H)     Patient shared that she has recently achieved 14 days of sobriety and expressed her determination to maintain her recovery. She explained that she currently does not have a support system in place, which can be challenging during this critical phase of recovery. However, a peer  was able to provide her with valuable information regarding a nearby Narcotics Anonymous (NA) meeting that could potentially serve as a source of support for her.      The patient has achieved 14 days of sobriety and expressed a lack of support system. The peer  provided information about an NA meeting called  Addicts Come Together  taking place at [address] [place] [address] at 12 pm. The specialist will also follow up with additional resources to assist the patient in building a stronger support network.                    [facility] provided business card to pt welcoming contact for recovery based support and resources. PRC and pt agree to speak again during an upcoming  visit.           Service Type:     Individual     Session Start Time:       1:00 pm                Session End Time:    1:15 pm    Session Length:         15 mins     Patient Goal:   To utilize suboxone assisted treatment for sobriety and long term recovery.     Goal Progress:   Ongoing.    Key Risk Factors to Recovery:   PRC encourages being aware of risk factors that can lead to re-use which include avoiding isolation, avoiding triggers and managing cravings in a healthy manner. being open and willing to  acceptance and change on a daily basis.  PRC encourages pt to establish a sober network calling tree to reach out to when needed.  Continue to practice honesty with ourselves and trusted support person(s).   PRC encourages regular attendance at recovery based meetings as well as finding a sponsor for mentoring and accountability.   PRC encourages consideration of other services such as counseling for mental health issues which can correlate with our substance use.      Support Needs:   Ongoing care, support and resources for opioid substance use disorder.     Follow up:   IRVING has provided pt with her contact information for support and resource needs.    PRC and pt agree to meet during an upcoming  visit.       [facility]  [address], [address]   [place], [place], [address]  Clinic Phone: [phone]  Clinic Fax: [phone]  Peer  phone: [phone]    Open Monday - Friday  9:00am-4:00pm  Walk in hours: 9am-3pm      Brandie Boo  November 6, 2023  1:16 PM    JOIE Alexandre provides clinical oversite and supervision of care.

## 2023-11-06 NOTE — PATIENT INSTRUCTIONS
[facility] center for Sublocade: [phone]    Dual diagnosis clinic (psychiatry and addiction care): [phone]

## 2023-11-06 NOTE — TELEPHONE ENCOUNTER
Pt is interested in transferring to Sublocade.  She is scheduled 11/20/23.     - I am certified to treat addictions under DATA 2000 waiver, XDEA # [id number], though this is no longer required to prescribe buprenorphine for treatment of OUD.   - I have reviewed recommendations for comprehensive treatment plan with the patient  - I have reviewed the patient's medications comprehensively  and provided education to the patient on risks associated with concurrent use of benzodiazepines, alcohol, other sedatives with opioids  - I have recommended concomitant psychosocial support  - I have complied with all aspects of REMS program for Sublocade. [facility] where Sublocade will be administered is in compliance with all aspects of REMS program.   - Patient meets DSM-5 criteria for moderate or severe opioid use disorder  - Patient has been prescribed buprenorphine 8-24mg/day for at least one 1 week at time of Sublocade, demonstrating tolerance of sublingual buprenorphine and control of withdrawal symptoms and cravings.   - Patient will discontinue sublingual buprenorphine when steady state achieved after starting Sublocade  - Patient does not have severe hepatic impairment.   - Patient does not have a history of long QT syndrome  - Patient does not take any antiarrhythmic medications.  She is prescribed desipramine and cyclobenzaprine which can have some impact on QT interval.  Risk mitigation strategies including periodic EKG monitoring.   - Urine Drug Screen on 11/6/23 was positive for buprenorphine  - Patient will not be receiving methadone while on Sublocade  - Patient will not be receiving any other long acting products for the treatment of opioid use disorder while on Sublocade

## 2023-11-06 NOTE — TELEPHONE ENCOUNTER
PSYCHIATRY CLINIC PHONE INTAKE     SERVICES REQUESTED / INTERESTED IN          Substance Use Treatment Treatment    Presenting Problem and Brief History                              What would you like to be seen for? (brief description):  just moved to [facility], looking for med management  Have you received a mental health diagnosis? Yes   Which one (s): bipolar, borderline personality disorder, PTSD, generalized anxiety disorder, depression.  Is there any history of developmental delay?  No   Are you currently seeing a mental health provider?  No            Who / month last seen:  na  Do you have mental health records elsewhere?  Yes  Will you sign a release so we can obtain them?  Yes    Have you ever been hospitalized for psychiatric reasons?  Yes  Describe:  multiple times, most recently last week, through [facility]    Do you have current thoughts of self-harm?  No    Do you currently have thoughts of harming others?  No    Do you have any safety concerns? No   If yes to these, offer to reach out to a  for follow up.      Substance Use History     Do you have any history of alcohol / illicit drug use?  Yes  Describe:  opiotes  Have you ever received treatment for this?  Yes    Describe:  went to many, doesn't remember where     Social History     Who is the patient's a guardian?   self     Name / number: self  Have you had an ACT team in last 12 months?  No  Describe: na   OK to leave a detailed voicemail?  Yes    Medical/ Surgical History                                   Patient Active Problem List   Diagnosis    Anxiety    Depression    Essential hypertension    Generalized anxiety disorder    Methamphetamine addiction (H)    Tobacco dependence syndrome    History of pre-eclampsia in prior pregnancy, currently pregnant in second trimester    Substance abuse affecting pregnancy in second trimester, antepartum (H)    Marginal cord insertion    HRP (high risk pregnancy), second trimester     Encounter for triage in pregnant patient    Pregnancy    Normal labor    Mixed anxiety depressive disorder    Insomnia    Pregnancy-induced hypertension    Encounter for routine postpartum follow-up    Tobacco use    Opioid use disorder, severe, dependence (H)    Depression with suicidal ideation    Night terrors    Anxiety    History of pulmonary embolism    Cervicalgia    Other chronic pain          Medications             Have you taken >3 psychiatric medications in your past?  y  Do you currently take 5 or more medications, including prescriptions, supplements, and other over the counter products?  y    If YES to at least one of these questions:   As part of your evaluation in our clinic, we have specially trained pharmacists as part of your care team. Your provider would like for you to meet with one of our pharmacists to review your current and past medications, ensure your med list is up to date, and queue up any questions or concerns you have about medications. They will review all of your medications, not just for mental health, to help ensure you know what you re taking and that everything is working together.     Please schedule patient in [facility] PSY[facility]IATRY (Anuja Irwin or Renetta De Jesus) for 60m MTM in any green space as virtual (video), telephone, or in person (designated in person days per Epic templates).  -Appt notes can say  Psych eval on xx/xx   -Route telephone encounter to the pharmacist who will be seeing the patient.  If patient has questions about insurance coverage or billing, please still schedule the visit and refer them to call the [facility] coordinators at [phone].    Current Outpatient Medications   Medication Sig Dispense Refill    ARIPiprazole (ABILIFY) 10 MG tablet Take 1 tablet (10 mg) by mouth At Bedtime (Patient not taking: Reported on 11/6/2023) 60 tablet 0    benztropine (COGENTIN) 0.5 MG tablet Take 0.5 mg by mouth At Bedtime (Patient not taking: Reported on 11/6/2023)       Biotin w/ Vitamins C & E 1250-7.5-7.5 MCG-MG-UNT CHEW Take 2 chew tab by mouth daily      buprenorphine HCl-naloxone HCl (SUBOXONE) 4-1 MG per film Place 1 Film under the tongue 2 times daily as needed (withdrawal symptoms)      buprenorphine-naloxone (SUBOXONE) 8-2 MG SUBL sublingual tablet Place 1 tablet under the tongue 2 times daily 30 tablet 0    busPIRone (BUSPAR) 15 MG tablet Take 1 tablet (15 mg) by mouth 3 times daily 180 tablet 0    clindamycin (CLEOCIN) 300 MG capsule Take 300 mg by mouth 4 times daily      clonazePAM (KLONOPIN) 2 MG tablet Take 1 tablet (2 mg) by mouth 2 times daily as needed for anxiety 60 tablet 1    cyclobenzaprine (FLEXERIL) 10 MG tablet Take 1 tablet (10 mg) by mouth every 8 hours as needed for muscle spasms (chronic pain) 120 tablet 1    desipramine (NORPRAMIN) 150 MG tablet Take 1 tablet (150 mg) by mouth At Bedtime 60 tablet 0    levonorgestrel (PLAN B) 1.5 MG tablet Take 1 tablet (1.5 mg) by mouth once for 1 dose 1 tablet 1    Melatonin 10 MG TABS tablet Take 15-20 mg by mouth nightly as needed for sleep      polyethylene glycol (MIRALAX) 17 GM/Dose powder Take 17 g (1 Capful) by mouth daily 578 g 11    prazosin (MINIPRESS) 2 MG capsule Take 3 mg by mouth nightly as needed (night terrors)      pregabalin (LYRICA) 100 MG capsule Take 1 capsule (100 mg) by mouth 3 times daily 180 capsule 1    sulfamethoxazole-trimethoprim (BACTRIM DS) 800-160 MG tablet Take 1 tablet by mouth 2 times daily 14 tablet 0         DISPOSITION      Phone screen completed with patient. Scheduled for CDE on 11/8/23 with Dr Marte.    Complex  Chio Garza

## 2023-11-06 NOTE — PROGRESS NOTES
11/03/2023 11/02/2023 6 Buprenorphine-Nalox 8-2 Mg Tab 14.00 7 Vi Lakeisha [id number] Gen (0831) 0/0 16.00 mg Medicaid [place]   10/25/2023 10/25/2023 6 Pregabalin 100 Mg Capsule 90.00 30 Kr Benitez [id number] Gen (0831) 0/1 2.01 LME Medicaid [place]   10/24/2023 10/24/2023 6 Clonazepam 2 Mg Tablet 60.00 30 Kr Benitez [id number] Gen (0831) 0/1 8.00 LME Medicaid [place]

## 2023-11-06 NOTE — TELEPHONE ENCOUNTER
Writer notified patient via [employer] of her scheduled appointment in the infusion center for Sublocade on 11/20/23 @3:00 pm. Patient has a follow-up visit in the Recovery Clinic on thesam day @1:30 pm. Patient will be on campus this Wednesday 11/08/2023 for other appointments, writer reminded patient to present to outpatient lab for lab work that has been ordered by Dr. Carpio as patient declined to do it on 11/06/2023.     Willa Samaniego RN on 11/6/2023 at 3:06 PM
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
## 2023-11-06 NOTE — PROGRESS NOTES
The patient completed the [place] as she scored elevated on the PHQ-9 and scored low risk and she stated that she wanted to be seen by this therapist to help support her recovery efforts.

## 2023-11-07 LAB
C TRACH DNA SPEC QL NAA+PROBE: NEGATIVE
N GONORRHOEA DNA SPEC QL NAA+PROBE: NEGATIVE

## 2023-11-08 ENCOUNTER — OFFICE VISIT (OUTPATIENT)
Dept: PSYCHIATRY | Facility: CLINIC | Age: 28
End: 2023-11-08
Attending: PSYCHIATRY & NEUROLOGY
Payer: MEDICAID

## 2023-11-08 ENCOUNTER — MYC REFILL (OUTPATIENT)
Dept: FAMILY MEDICINE | Facility: CLINIC | Age: 28
End: 2023-11-08

## 2023-11-08 VITALS
HEART RATE: 96 BPM | WEIGHT: 230.2 LBS | DIASTOLIC BLOOD PRESSURE: 87 MMHG | SYSTOLIC BLOOD PRESSURE: 121 MMHG | BODY MASS INDEX: 38.31 KG/M2

## 2023-11-08 DIAGNOSIS — Z00.00 ROUTINE HEALTH MAINTENANCE: Primary | ICD-10-CM

## 2023-11-08 DIAGNOSIS — F60.3 BORDERLINE PERSONALITY DISORDER (H): ICD-10-CM

## 2023-11-08 DIAGNOSIS — F41.1 GENERALIZED ANXIETY DISORDER: ICD-10-CM

## 2023-11-08 DIAGNOSIS — F11.20 OPIOID USE DISORDER, SEVERE, DEPENDENCE (H): Primary | ICD-10-CM

## 2023-11-08 DIAGNOSIS — F41.8 MIXED ANXIETY DEPRESSIVE DISORDER: ICD-10-CM

## 2023-11-08 DIAGNOSIS — M54.2 CERVICALGIA: ICD-10-CM

## 2023-11-08 DIAGNOSIS — G89.29 OTHER CHRONIC PAIN: ICD-10-CM

## 2023-11-08 DIAGNOSIS — F43.10 POST TRAUMATIC STRESS DISORDER (PTSD): ICD-10-CM

## 2023-11-08 DIAGNOSIS — F39 UNSPECIFIED MOOD (AFFECTIVE) DISORDER (H): ICD-10-CM

## 2023-11-08 DIAGNOSIS — Z72.0 TOBACCO USE: ICD-10-CM

## 2023-11-08 PROCEDURE — 90792 PSYCH DIAG EVAL W/MED SRVCS: CPT | Mod: GC | Performed by: PSYCHIATRY & NEUROLOGY

## 2023-11-08 RX ORDER — DESIPRAMINE HYDROCHLORIDE 150 MG/1
150 TABLET ORAL AT BEDTIME
Qty: 60 TABLET | Refills: 0 | Status: SHIPPED | OUTPATIENT
Start: 2023-11-08 | End: 2024-01-09

## 2023-11-08 RX ORDER — CYCLOBENZAPRINE HCL 10 MG
10 TABLET ORAL EVERY 8 HOURS PRN
Qty: 120 TABLET | Refills: 1 | Status: SHIPPED | OUTPATIENT
Start: 2023-11-08 | End: 2024-02-14

## 2023-11-08 RX ORDER — PREGABALIN 100 MG/1
100 CAPSULE ORAL 3 TIMES DAILY
Qty: 180 CAPSULE | Refills: 1 | OUTPATIENT
Start: 2023-11-08

## 2023-11-08 ASSESSMENT — PATIENT HEALTH QUESTIONNAIRE - PHQ9
SUM OF ALL RESPONSES TO PHQ QUESTIONS 1-9: 12
10. IF YOU CHECKED OFF ANY PROBLEMS, HOW DIFFICULT HAVE THESE PROBLEMS MADE IT FOR YOU TO DO YOUR WORK, TAKE CARE OF THINGS AT HOME, OR GET ALONG WITH OTHER PEOPLE: VERY DIFFICULT
SUM OF ALL RESPONSES TO PHQ QUESTIONS 1-9: 12

## 2023-11-08 ASSESSMENT — PAIN SCALES - GENERAL: PAINLEVEL: MODERATE PAIN (4)
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
## 2023-11-08 NOTE — PATIENT INSTRUCTIONS
It was nice to meet you today. Here is what we discussed:    - Restart prazosin 2 mg at bedtime     -We will make a referral for DBT and discuss your care with Dr. Carpio and Dorota Marte MD  [facility]     **For crisis resources, please see the information at the end of this document**   Patient Education    Thank you for coming to the [facility] [place] [facility].     Lab Testing:  If you had lab testing today and your results are reassuring or normal they will be mailed to you or sent through [employer] within 7 days. If the lab tests need quick action we will call you with the results. The phone number we will call with results is # [phone]. If this is not the best number please call our clinic and change the number.     Medication Refills:  If you need any refills please call your pharmacy and they will contact us. Our fax number for refills is [phone].   Three business days of notice are needed for general medication refill requests.   Five business days of notice are needed for controlled substance refill requests.   If you need to change to a different pharmacy, please contact the new pharmacy directly. The new pharmacy will help you get your medications transferred.     Contact Us:  Please call [phone] during business hours (8-5:00 M-F).   If you have medication related questions after clinic hours, or on the weekend, please call [phone].     Financial Assistance [phone]   Medical Records [phone]       MENTAL HEALTH CRISIS RESOURCES:  For a emergency help, please call 911 or go to the nearest Emergency Department.     Emergency Walk-In Options:   EmPATH Unit @ [facility] ([place]): [phone] - Specialized mental health emergency area designed to be calming  [facility] ([place]): [phone]  [facility] ([place]):  [phone]  [facility] ([place]): [phone]    [facility] Crisis Information:   [place]: [phone]  [place]: [phone]  Priscila VAZQUEZ) - Adult: [phone]     Child: [phone]  [place] - Adult: [phone]     Child: [phone]  [place]: [phone]  List of all [facility] resources:   https://[place].[facility]/dhs/people-we-serve/adults/health-care/mental-health/resources/crisis-contacts.jsp    National Crisis Information:   Crisis Text Line: Text  [place]  to 936326  Suicide & Crisis Lifeline: 988  National Suicide Prevention Lifeline: 5-726-016-TALK (1-507.715.1583)       For online chat options, visit https://suicidepreventionlifeline.org/chat/  Poison Control Center: 1-917.207.4618  Trans Lifeline: 4-853-109-4845 - Hotline for transgender people of all ages  The Driss Project: 7-520-244-3569 - Hotline for LGBT youth     For Non-Emergency Support:   Fast Tracker: Mental Health & Substance Use Disorder Resources -   https://www.Project Fixupn.org/

## 2023-11-09 LAB
FENTANYL UR-MCNC: 1.7 NG/ML
NORFENTANYL UR-MCNC: 5.5 NG/ML

## 2023-11-14 ENCOUNTER — TELEPHONE (OUTPATIENT)
Dept: BEHAVIORAL HEALTH | Facility: CLINIC | Age: 28
End: 2023-11-14
Payer: MEDICAID

## 2023-12-01 ENCOUNTER — E-VISIT (OUTPATIENT)
Dept: INTERNAL MEDICINE | Facility: CLINIC | Age: 28
End: 2023-12-01
Payer: COMMERCIAL

## 2023-12-01 DIAGNOSIS — M54.50 CHRONIC LOW BACK PAIN, UNSPECIFIED BACK PAIN LATERALITY, UNSPECIFIED WHETHER SCIATICA PRESENT: Primary | ICD-10-CM

## 2023-12-01 DIAGNOSIS — G89.29 CHRONIC LOW BACK PAIN, UNSPECIFIED BACK PAIN LATERALITY, UNSPECIFIED WHETHER SCIATICA PRESENT: Primary | ICD-10-CM

## 2023-12-01 PROCEDURE — 99422 OL DIG E/M SVC 11-20 MIN: CPT

## 2023-12-04 ENCOUNTER — MYC MEDICAL ADVICE (OUTPATIENT)
Dept: OBGYN | Facility: CLINIC | Age: 28
End: 2023-12-04

## 2023-12-04 ENCOUNTER — TELEPHONE (OUTPATIENT)
Dept: OBGYN | Facility: CLINIC | Age: 28
End: 2023-12-04

## 2023-12-04 ENCOUNTER — MYC REFILL (OUTPATIENT)
Dept: FAMILY MEDICINE | Facility: CLINIC | Age: 28
End: 2023-12-04

## 2023-12-04 DIAGNOSIS — Z30.012 ENCOUNTER FOR EMERGENCY CONTRACEPTION: ICD-10-CM

## 2023-12-04 DIAGNOSIS — F41.8 MIXED ANXIETY DEPRESSIVE DISORDER: ICD-10-CM

## 2023-12-04 DIAGNOSIS — F51.04 CHRONIC INSOMNIA: Primary | ICD-10-CM

## 2023-12-04 RX ORDER — LEVONORGESTREL 1.5 MG/1
1.5 TABLET ORAL ONCE
Qty: 1 TABLET | Refills: 1 | Status: SHIPPED | OUTPATIENT
Start: 2023-12-04 | End: 2024-04-23

## 2023-12-04 RX ORDER — ARIPIPRAZOLE 10 MG/1
10 TABLET ORAL AT BEDTIME
Qty: 90 TABLET | Refills: 0 | Status: SHIPPED | OUTPATIENT
Start: 2023-12-04 | End: 2024-02-26

## 2023-12-04 RX ORDER — PHENOL 1.4 %
10-20 AEROSOL, SPRAY (ML) MUCOUS MEMBRANE
Qty: 90 TABLET | Refills: 3 | Status: SHIPPED | OUTPATIENT
Start: 2023-12-04 | End: 2024-02-27

## 2023-12-04 RX ORDER — METHYLPREDNISOLONE 4 MG
TABLET, DOSE PACK ORAL
Qty: 21 TABLET | Refills: 0 | Status: SHIPPED | OUTPATIENT
Start: 2023-12-04 | End: 2023-12-07
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
## 2023-12-04 NOTE — PATIENT INSTRUCTIONS
Thank you for choosing us for your care. I have placed an order for a prescription so that you can start treatment. View your full visit summary for details by clicking on the link below. Your pharmacist will able to address any questions you may have about the medication.      If you're not feeling better within 2-3 weeks please schedule an appointment.  You can schedule an appointment right here in [facility], or call [phone]  If the visit is for the same symptoms as your eVisit, we'll refund the cost of your eVisit if seen within seven days.

## 2023-12-04 NOTE — TELEPHONE ENCOUNTER
"From Vilma: \"  This patient has [employer] for coverage, which does not cover abortions.  [place] Medicaid covers, please make sure Medical Necessity Statement is signed.      Please let me know when patient is scheduled so I can change coverage to [place].\"    As of 1200 on 12/4, RN team has not heard back from patient.   ADELFO Griffith  "

## 2023-12-04 NOTE — TELEPHONE ENCOUNTER
"[facility].   US done on 12/2 at [facility]: \"probably IUP, possible gestational sac measures 5 weeks 4 days, no yolk sac visualized on today's US.\"  Patient was unable to tolerate procedure with IV sedation so was referred    Current GA is 5 weeks 6 days, SHERYL 7/30/24    RN sent email for insurance coverage check.  RN attempted to call patient multiple times, busy signal each time. Sent [employer] message,     PP records are already uploaded in the chart.    ADELFO Griffith   "

## 2023-12-05 NOTE — TELEPHONE ENCOUNTER
[facility] Call Center    Phone Message    May a detailed message be left on voicemail: yes     Reason for Call: Other: Pt reached back out per vm from RN. Writer unable to reach RN/team via secure chat.Please contact pt when able.      Action Taken: Message routed to:  Other: KEVIN WHITEHEAD    Travel Screening: Not Applicable                                                                     abdominal pain

## 2023-12-05 NOTE — TELEPHONE ENCOUNTER
RN was able to connect with patient over the phone.     [facility] unsuccessful due to amount of pain the patient was in.     RN asked patient if they discussed medication (mife and miso) options for termination and she said they did not. Per US on 12/2 probably IUP measures 5w4d, no yolk sac.     Patient is very interested in oral pills for MTOP versus D&C if it is a safer/better option.     Scheduled with Dr Garza on 12/7.     ADELFO Griffith

## 2023-12-07 ENCOUNTER — TELEPHONE (OUTPATIENT)
Dept: OBGYN | Facility: CLINIC | Age: 28
End: 2023-12-07

## 2023-12-07 ENCOUNTER — OFFICE VISIT (OUTPATIENT)
Dept: OBGYN | Facility: CLINIC | Age: 28
End: 2023-12-07
Payer: COMMERCIAL

## 2023-12-07 VITALS
HEART RATE: 89 BPM | DIASTOLIC BLOOD PRESSURE: 87 MMHG | BODY MASS INDEX: 38.77 KG/M2 | WEIGHT: 233 LBS | SYSTOLIC BLOOD PRESSURE: 126 MMHG

## 2023-12-07 DIAGNOSIS — Z33.2 TERMINATION OF PREGNANCY (FETUS): ICD-10-CM

## 2023-12-07 DIAGNOSIS — Z01.818 PRE-OP EXAM: Primary | ICD-10-CM

## 2023-12-07 DIAGNOSIS — Z33.2 TERMINATION OF PREGNANCY (FETUS): Primary | ICD-10-CM

## 2023-12-07 LAB
ABO/RH(D): NORMAL
ANTIBODY SCREEN: NEGATIVE
SPECIMEN EXPIRATION DATE: NORMAL

## 2023-12-07 PROCEDURE — 99203 OFFICE O/P NEW LOW 30 MIN: CPT | Performed by: OBSTETRICS & GYNECOLOGY
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
## 2023-12-07 NOTE — TELEPHONE ENCOUNTER
Type of surgery: gyn  Location of surgery: [facility] [place] OR  Date and time of surgery: 12/11/23 2PM  Surgeon: Lisa  Pre-Op Appt Date: surgeon  Post-Op Appt Date: patient wanted to wait to schedule   Packet sent out:  will  at lab appt tomorrow  Pre-cert/Authorization completed:  Not Applicable  Date: 12/07/23     Ronda  She/her/hers  [facility]
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
## 2023-12-08 ENCOUNTER — LAB (OUTPATIENT)
Dept: LAB | Facility: CLINIC | Age: 28
End: 2023-12-08
Payer: COMMERCIAL

## 2023-12-08 DIAGNOSIS — Z33.2 TERMINATION OF PREGNANCY (FETUS): ICD-10-CM

## 2023-12-08 DIAGNOSIS — Z01.818 PRE-OP EXAM: ICD-10-CM

## 2023-12-08 LAB
ERYTHROCYTE [DISTWIDTH] IN BLOOD BY AUTOMATED COUNT: 11.5 % (ref 10–15)
HCT VFR BLD AUTO: 42.3 % (ref 35–47)
HGB BLD-MCNC: 14.3 G/DL (ref 11.7–15.7)
MCH RBC QN AUTO: 30.2 PG (ref 26.5–33)
MCHC RBC AUTO-ENTMCNC: 33.8 G/DL (ref 31.5–36.5)
MCV RBC AUTO: 89 FL (ref 78–100)
PLATELET # BLD AUTO: 159 10E3/UL (ref 150–450)
RBC # BLD AUTO: 4.73 10E6/UL (ref 3.8–5.2)
WBC # BLD AUTO: 7.8 10E3/UL (ref 4–11)

## 2023-12-08 PROCEDURE — 86900 BLOOD TYPING SEROLOGIC ABO: CPT

## 2023-12-08 PROCEDURE — 86901 BLOOD TYPING SEROLOGIC RH(D): CPT

## 2023-12-08 PROCEDURE — 86850 RBC ANTIBODY SCREEN: CPT

## 2023-12-08 PROCEDURE — 85027 COMPLETE CBC AUTOMATED: CPT

## 2023-12-08 PROCEDURE — 36415 COLL VENOUS BLD VENIPUNCTURE: CPT

## 2023-12-08 PROCEDURE — 87635 SARS-COV-2 COVID-19 AMP PRB: CPT

## 2023-12-09 LAB — SARS-COV-2 RNA RESP QL NAA+PROBE: NEGATIVE

## 2023-12-10 ENCOUNTER — ANESTHESIA EVENT (OUTPATIENT)
Dept: SURGERY | Facility: CLINIC | Age: 28
End: 2023-12-10
Payer: MEDICAID

## 2023-12-11 ENCOUNTER — ANESTHESIA (OUTPATIENT)
Dept: SURGERY | Facility: CLINIC | Age: 28
End: 2023-12-11
Payer: MEDICAID

## 2023-12-11 ENCOUNTER — HOSPITAL ENCOUNTER (OUTPATIENT)
Facility: CLINIC | Age: 28
Discharge: HOME | End: 2023-12-11
Attending: OBSTETRICS & GYNECOLOGY | Admitting: OBSTETRICS & GYNECOLOGY
Payer: MEDICAID

## 2023-12-11 ENCOUNTER — TELEPHONE (OUTPATIENT)
Dept: OBGYN | Facility: CLINIC | Age: 28
End: 2023-12-11

## 2023-12-11 VITALS
WEIGHT: 225.31 LBS | HEIGHT: 65 IN | DIASTOLIC BLOOD PRESSURE: 80 MMHG | TEMPERATURE: 98 F | BODY MASS INDEX: 37.54 KG/M2 | HEART RATE: 95 BPM | OXYGEN SATURATION: 95 % | SYSTOLIC BLOOD PRESSURE: 122 MMHG | RESPIRATION RATE: 20 BRPM

## 2023-12-11 DIAGNOSIS — Z98.890 STATUS POST DILATION AND CURETTAGE: Primary | ICD-10-CM

## 2023-12-11 PROCEDURE — 59840 INDUCED ABORTION D&C: CPT | Performed by: OBSTETRICS & GYNECOLOGY

## 2023-12-11 PROCEDURE — 11981 INSERTION DRUG DLVR IMPLANT: CPT | Mod: 51 | Performed by: OBSTETRICS & GYNECOLOGY

## 2023-12-11 PROCEDURE — 250N000011 HC RX IP 250 OP 636: Performed by: ANESTHESIOLOGY

## 2023-12-11 PROCEDURE — 88305 TISSUE EXAM BY PATHOLOGIST: CPT | Mod: TC | Performed by: OBSTETRICS & GYNECOLOGY

## 2023-12-11 PROCEDURE — 250N000011 HC RX IP 250 OP 636: Performed by: NURSE ANESTHETIST, CERTIFIED REGISTERED

## 2023-12-11 PROCEDURE — 258N000003 HC RX IP 258 OP 636: Performed by: NURSE ANESTHETIST, CERTIFIED REGISTERED

## 2023-12-11 PROCEDURE — 250N000009 HC RX 250: Performed by: NURSE ANESTHETIST, CERTIFIED REGISTERED

## 2023-12-11 PROCEDURE — 999N000141 HC STATISTIC PRE-PROCEDURE NURSING ASSESSMENT: Performed by: OBSTETRICS & GYNECOLOGY

## 2023-12-11 PROCEDURE — 360N000075 HC SURGERY LEVEL 2, PER MIN: Performed by: OBSTETRICS & GYNECOLOGY

## 2023-12-11 PROCEDURE — 250N000013 HC RX MED GY IP 250 OP 250 PS 637: Performed by: OBSTETRICS & GYNECOLOGY

## 2023-12-11 PROCEDURE — 710N000012 HC RECOVERY PHASE 2, PER MINUTE: Performed by: OBSTETRICS & GYNECOLOGY

## 2023-12-11 PROCEDURE — 370N000017 HC ANESTHESIA TECHNICAL FEE, PER MIN: Performed by: OBSTETRICS & GYNECOLOGY

## 2023-12-11 PROCEDURE — 250N000009 HC RX 250: Performed by: OBSTETRICS & GYNECOLOGY

## 2023-12-11 PROCEDURE — 88305 TISSUE EXAM BY PATHOLOGIST: CPT | Mod: 26 | Performed by: PATHOLOGY

## 2023-12-11 PROCEDURE — 250N000011 HC RX IP 250 OP 636: Performed by: OBSTETRICS & GYNECOLOGY

## 2023-12-11 PROCEDURE — 272N000001 HC OR GENERAL SUPPLY STERILE: Performed by: OBSTETRICS & GYNECOLOGY

## 2023-12-11 DEVICE — IMPLANTABLE DEVICE: Type: IMPLANTABLE DEVICE | Site: ARM | Status: FUNCTIONAL

## 2023-12-11 RX ORDER — ONDANSETRON 4 MG/1
4 TABLET, ORALLY DISINTEGRATING ORAL EVERY 8 HOURS PRN
Qty: 4 TABLET | Refills: 0 | Status: SHIPPED | OUTPATIENT
Start: 2023-12-11 | End: 2023-12-11

## 2023-12-11 RX ORDER — FENTANYL CITRATE 50 UG/ML
50 INJECTION, SOLUTION INTRAMUSCULAR; INTRAVENOUS EVERY 5 MIN PRN
Status: DISCONTINUED | OUTPATIENT
Start: 2023-12-11 | End: 2023-12-11 | Stop reason: HOSPADM

## 2023-12-11 RX ORDER — ACETAMINOPHEN 325 MG/1
975 TABLET ORAL ONCE
Status: COMPLETED | OUTPATIENT
Start: 2023-12-11 | End: 2023-12-11

## 2023-12-11 RX ORDER — ONDANSETRON 2 MG/ML
4 INJECTION INTRAMUSCULAR; INTRAVENOUS EVERY 30 MIN PRN
Status: DISCONTINUED | OUTPATIENT
Start: 2023-12-11 | End: 2023-12-11 | Stop reason: HOSPADM

## 2023-12-11 RX ORDER — DOXYCYCLINE 100 MG/10ML
100 INJECTION, POWDER, LYOPHILIZED, FOR SOLUTION INTRAVENOUS
Status: COMPLETED | OUTPATIENT
Start: 2023-12-11 | End: 2023-12-11

## 2023-12-11 RX ORDER — DEXAMETHASONE SODIUM PHOSPHATE 4 MG/ML
INJECTION, SOLUTION INTRA-ARTICULAR; INTRALESIONAL; INTRAMUSCULAR; INTRAVENOUS; SOFT TISSUE PRN
Status: DISCONTINUED | OUTPATIENT
Start: 2023-12-11 | End: 2023-12-11

## 2023-12-11 RX ORDER — PROPOFOL 10 MG/ML
INJECTION, EMULSION INTRAVENOUS PRN
Status: DISCONTINUED | OUTPATIENT
Start: 2023-12-11 | End: 2023-12-11

## 2023-12-11 RX ORDER — HYDROMORPHONE HYDROCHLORIDE 1 MG/ML
0.4 INJECTION, SOLUTION INTRAMUSCULAR; INTRAVENOUS; SUBCUTANEOUS EVERY 5 MIN PRN
Status: DISCONTINUED | OUTPATIENT
Start: 2023-12-11 | End: 2023-12-11 | Stop reason: HOSPADM

## 2023-12-11 RX ORDER — AMOXICILLIN 250 MG
1-2 CAPSULE ORAL 2 TIMES DAILY
Qty: 30 TABLET | Refills: 0 | Status: SHIPPED | OUTPATIENT
Start: 2023-12-11 | End: 2024-02-26

## 2023-12-11 RX ORDER — LIDOCAINE HYDROCHLORIDE 10 MG/ML
INJECTION, SOLUTION INFILTRATION; PERINEURAL PRN
Status: DISCONTINUED | OUTPATIENT
Start: 2023-12-11 | End: 2023-12-11 | Stop reason: HOSPADM

## 2023-12-11 RX ORDER — KETOROLAC TROMETHAMINE 30 MG/ML
INJECTION, SOLUTION INTRAMUSCULAR; INTRAVENOUS PRN
Status: DISCONTINUED | OUTPATIENT
Start: 2023-12-11 | End: 2023-12-11

## 2023-12-11 RX ORDER — ONDANSETRON 2 MG/ML
4 INJECTION INTRAMUSCULAR; INTRAVENOUS EVERY 30 MIN PRN
Status: CANCELLED | OUTPATIENT
Start: 2023-12-11

## 2023-12-11 RX ORDER — OXYCODONE HYDROCHLORIDE 5 MG/1
5 TABLET ORAL
Status: COMPLETED | OUTPATIENT
Start: 2023-12-11 | End: 2023-12-11

## 2023-12-11 RX ORDER — AMOXICILLIN 250 MG
1-2 CAPSULE ORAL 2 TIMES DAILY
Qty: 30 TABLET | Refills: 0 | Status: SHIPPED | OUTPATIENT
Start: 2023-12-11 | End: 2023-12-11

## 2023-12-11 RX ORDER — FENTANYL CITRATE 50 UG/ML
25 INJECTION, SOLUTION INTRAMUSCULAR; INTRAVENOUS EVERY 5 MIN PRN
Status: DISCONTINUED | OUTPATIENT
Start: 2023-12-11 | End: 2023-12-11 | Stop reason: HOSPADM

## 2023-12-11 RX ORDER — OXYCODONE HYDROCHLORIDE 5 MG/1
5 TABLET ORAL
Status: CANCELLED | OUTPATIENT
Start: 2023-12-11

## 2023-12-11 RX ORDER — ONDANSETRON 4 MG/1
4 TABLET, ORALLY DISINTEGRATING ORAL EVERY 8 HOURS PRN
Qty: 4 TABLET | Refills: 0 | Status: SHIPPED | OUTPATIENT
Start: 2023-12-11 | End: 2024-04-29

## 2023-12-11 RX ORDER — ONDANSETRON 2 MG/ML
INJECTION INTRAMUSCULAR; INTRAVENOUS PRN
Status: DISCONTINUED | OUTPATIENT
Start: 2023-12-11 | End: 2023-12-11

## 2023-12-11 RX ORDER — SODIUM CHLORIDE, SODIUM LACTATE, POTASSIUM CHLORIDE, CALCIUM CHLORIDE 600; 310; 30; 20 MG/100ML; MG/100ML; MG/100ML; MG/100ML
INJECTION, SOLUTION INTRAVENOUS CONTINUOUS
Status: DISCONTINUED | OUTPATIENT
Start: 2023-12-11 | End: 2023-12-11 | Stop reason: HOSPADM

## 2023-12-11 RX ORDER — ONDANSETRON 4 MG/1
4 TABLET, ORALLY DISINTEGRATING ORAL EVERY 30 MIN PRN
Status: CANCELLED | OUTPATIENT
Start: 2023-12-11

## 2023-12-11 RX ORDER — LIDOCAINE HYDROCHLORIDE 20 MG/ML
INJECTION, SOLUTION INFILTRATION; PERINEURAL PRN
Status: DISCONTINUED | OUTPATIENT
Start: 2023-12-11 | End: 2023-12-11

## 2023-12-11 RX ORDER — ACETAMINOPHEN 325 MG/1
975 TABLET ORAL EVERY 6 HOURS PRN
Qty: 50 TABLET | Refills: 0 | Status: SHIPPED | OUTPATIENT
Start: 2023-12-11 | End: 2023-12-11

## 2023-12-11 RX ORDER — DEXMEDETOMIDINE HYDROCHLORIDE 4 UG/ML
INJECTION, SOLUTION INTRAVENOUS PRN
Status: DISCONTINUED | OUTPATIENT
Start: 2023-12-11 | End: 2023-12-11

## 2023-12-11 RX ORDER — ACETAMINOPHEN 325 MG/1
975 TABLET ORAL EVERY 6 HOURS PRN
Qty: 50 TABLET | Refills: 0 | Status: SHIPPED | OUTPATIENT
Start: 2023-12-11 | End: 2024-02-27

## 2023-12-11 RX ORDER — SODIUM CHLORIDE, SODIUM LACTATE, POTASSIUM CHLORIDE, CALCIUM CHLORIDE 600; 310; 30; 20 MG/100ML; MG/100ML; MG/100ML; MG/100ML
INJECTION, SOLUTION INTRAVENOUS CONTINUOUS PRN
Status: DISCONTINUED | OUTPATIENT
Start: 2023-12-11 | End: 2023-12-11

## 2023-12-11 RX ORDER — OXYCODONE HYDROCHLORIDE 5 MG/1
10 TABLET ORAL
Status: CANCELLED | OUTPATIENT
Start: 2023-12-11

## 2023-12-11 RX ORDER — ONDANSETRON 4 MG/1
4 TABLET, ORALLY DISINTEGRATING ORAL EVERY 30 MIN PRN
Status: DISCONTINUED | OUTPATIENT
Start: 2023-12-11 | End: 2023-12-11 | Stop reason: HOSPADM

## 2023-12-11 RX ORDER — ACETAMINOPHEN 325 MG/1
975 TABLET ORAL ONCE
Status: DISCONTINUED | OUTPATIENT
Start: 2023-12-11 | End: 2023-12-11 | Stop reason: HOSPADM

## 2023-12-11 RX ORDER — FENTANYL CITRATE 50 UG/ML
INJECTION, SOLUTION INTRAMUSCULAR; INTRAVENOUS PRN
Status: DISCONTINUED | OUTPATIENT
Start: 2023-12-11 | End: 2023-12-11

## 2023-12-11 RX ORDER — PROPOFOL 10 MG/ML
INJECTION, EMULSION INTRAVENOUS CONTINUOUS PRN
Status: DISCONTINUED | OUTPATIENT
Start: 2023-12-11 | End: 2023-12-11

## 2023-12-11 RX ORDER — HYDROMORPHONE HYDROCHLORIDE 1 MG/ML
0.2 INJECTION, SOLUTION INTRAMUSCULAR; INTRAVENOUS; SUBCUTANEOUS EVERY 5 MIN PRN
Status: DISCONTINUED | OUTPATIENT
Start: 2023-12-11 | End: 2023-12-11 | Stop reason: HOSPADM

## 2023-12-11 RX ADMIN — PROPOFOL 30 MG: 10 INJECTION, EMULSION INTRAVENOUS at 13:39

## 2023-12-11 RX ADMIN — MIDAZOLAM 2 MG: 1 INJECTION INTRAMUSCULAR; INTRAVENOUS at 13:10

## 2023-12-11 RX ADMIN — LIDOCAINE HYDROCHLORIDE 60 MG: 20 INJECTION, SOLUTION INFILTRATION; PERINEURAL at 13:33

## 2023-12-11 RX ADMIN — ACETAMINOPHEN 975 MG: 325 TABLET, FILM COATED ORAL at 13:13

## 2023-12-11 RX ADMIN — DEXMEDETOMIDINE 12 MCG: 100 INJECTION, SOLUTION, CONCENTRATE INTRAVENOUS at 13:33

## 2023-12-11 RX ADMIN — KETOROLAC TROMETHAMINE 30 MG: 30 INJECTION, SOLUTION INTRAMUSCULAR at 13:52

## 2023-12-11 RX ADMIN — ONDANSETRON 4 MG: 2 INJECTION INTRAMUSCULAR; INTRAVENOUS at 13:41

## 2023-12-11 RX ADMIN — PROPOFOL 125 MCG/KG/MIN: 10 INJECTION, EMULSION INTRAVENOUS at 13:35

## 2023-12-11 RX ADMIN — FENTANYL CITRATE 50 MCG: 50 INJECTION INTRAMUSCULAR; INTRAVENOUS at 13:47

## 2023-12-11 RX ADMIN — MIDAZOLAM 2 MG: 1 INJECTION INTRAMUSCULAR; INTRAVENOUS at 13:26

## 2023-12-11 RX ADMIN — SODIUM CHLORIDE, POTASSIUM CHLORIDE, SODIUM LACTATE AND CALCIUM CHLORIDE: 600; 310; 30; 20 INJECTION, SOLUTION INTRAVENOUS at 13:26

## 2023-12-11 RX ADMIN — DEXAMETHASONE SODIUM PHOSPHATE 4 MG: 4 INJECTION, SOLUTION INTRA-ARTICULAR; INTRALESIONAL; INTRAMUSCULAR; INTRAVENOUS; SOFT TISSUE at 13:41

## 2023-12-11 RX ADMIN — FENTANYL CITRATE 50 MCG: 50 INJECTION INTRAMUSCULAR; INTRAVENOUS at 13:33

## 2023-12-11 RX ADMIN — PROPOFOL 50 MG: 10 INJECTION, EMULSION INTRAVENOUS at 13:35

## 2023-12-11 RX ADMIN — OXYCODONE HYDROCHLORIDE 5 MG: 5 TABLET ORAL at 14:26

## 2023-12-11 RX ADMIN — DOXYCYCLINE 100 MG: 100 INJECTION, POWDER, LYOPHILIZED, FOR SOLUTION INTRAVENOUS at 13:13

## 2023-12-11 RX ADMIN — PROPOFOL 40 MG: 10 INJECTION, EMULSION INTRAVENOUS at 13:47

## 2023-12-11 ASSESSMENT — ACTIVITIES OF DAILY LIVING (ADL): ADLS_ACUITY_SCORE: 37

## 2023-12-11 ASSESSMENT — LIFESTYLE VARIABLES: TOBACCO_USE: 1

## 2023-12-11 NOTE — ANESTHESIA POSTPROCEDURE EVALUATION
Patient: Ileana Thorne    Procedure: Procedure(s):  DILATION AND CURETTAGE, UTERUS, USING SUCTION  Implant hormone, Nexplanon       Anesthesia Type:  MAC    Note:  Disposition: Outpatient   Postop Pain Control: Uneventful            Sign Out: Well controlled pain   PONV: No   Neuro/Psych: Uneventful            Sign Out: Acceptable/Baseline neuro status   Airway/Respiratory: Uneventful            Sign Out: Acceptable/Baseline resp. status   CV/Hemodynamics: Uneventful            Sign Out: Acceptable CV status; No obvious hypovolemia; No obvious fluid overload   Other NRE: NONE   DID A NON-ROUTINE EVENT OCCUR? No       Last vitals:  Vitals Value Taken Time   /113 12/11/23 1415   Temp     Pulse 84 12/11/23 1415   Resp     SpO2 97 % 12/11/23 1423   Vitals shown include unfiled device data.    Electronically Signed By: Ricardo Jane MD  December 11, 2023  2:24 PM

## 2023-12-11 NOTE — DISCHARGE INSTRUCTIONS

## 2023-12-11 NOTE — ANESTHESIA PREPROCEDURE EVALUATION
Anesthesia Pre-Procedure Evaluation    Patient: Ileana Thorne   MRN: [id number] : 1995        Procedure : Procedure(s):  DILATION AND CURETTAGE, UTERUS, USING SUCTION          Past Medical History:   Diagnosis Date     Anxiety      Arthritis      Depressive disorder      Essential hypertension 2015     PTSD (post-traumatic stress disorder)      Pulmonary embolism (H)     birth control pills and tobacco; s/p six months anticoagulation     Substance abuse (H) 2014     Uncomplicated asthma       Past Surgical History:   Procedure Laterality Date     APPENDECTOMY      open     IP DILATION AND CURETTAGE PROCEDURE       TONSILLECTOMY & ADENOIDECTOMY        Allergies   Allergen Reactions     Seasonal Allergies Other (See Comments)      Social History     Tobacco Use     Smoking status: Some Days     Types: Cigarettes, Vaping Device     Smokeless tobacco: Never   Substance Use Topics     Alcohol use: No      Wt Readings from Last 1 Encounters:   23 102.2 kg (225 lb 5 oz)        Anesthesia Evaluation   Pt has had prior anesthetic. Type: General.        ROS/MED HX  ENT/Pulmonary:     (+)                tobacco use, Current use,    asthma                  Neurologic:       Cardiovascular:     (+)  hypertension- -   -  - -                                      METS/Exercise Tolerance:     Hematologic:       Musculoskeletal:       GI/Hepatic:       Renal/Genitourinary:       Endo:     (+)               Obesity,       Psychiatric/Substance Use:     (+) psychiatric history anxiety and depression       Infectious Disease:       Malignancy:       Other:          Physical Exam    Airway        Mallampati: II   TM distance: > 3 FB   Neck ROM: full   Mouth opening: > 3 cm    Respiratory Devices and Support         Dental       (+) Multiple visibly decayed, broken teeth      Cardiovascular   cardiovascular exam normal       Rhythm and rate: regular and normal     Pulmonary   pulmonary exam normal         "breath sounds clear to auscultation       OUTSIDE LABS:  CBC:   Lab Results   Component Value Date    WBC 7.8 12/08/2023    WBC 6.6 11/01/2023    HGB 14.3 12/08/2023    HGB 14.1 11/01/2023    HCT 42.3 12/08/2023    HCT 41.5 11/01/2023     12/08/2023     11/01/2023     BMP:   Lab Results   Component Value Date     11/01/2023     03/03/2017    POTASSIUM 4.6 11/01/2023    POTASSIUM 3.7 05/14/2017    CHLORIDE 102 11/01/2023    CHLORIDE 105 03/03/2017    CO2 26 11/01/2023    CO2 28 03/03/2017    BUN 13.8 11/01/2023    BUN 16 03/03/2017    CR 0.85 11/01/2023    CR 0.73 05/14/2017     (H) 11/01/2023     (H) 05/14/2017     COAGS: No results found for: \"PTT\", \"INR\", \"FIBR\"  POC:   Lab Results   Component Value Date    HCG Negative 11/06/2023     HEPATIC:   Lab Results   Component Value Date    ALBUMIN 4.5 11/01/2023    PROTTOTAL 7.5 11/01/2023    ALT 29 11/01/2023    AST 28 11/01/2023    ALKPHOS 80 11/01/2023    BILITOTAL 0.2 11/01/2023     OTHER:   Lab Results   Component Value Date    MALACHI 9.7 11/01/2023    TSH 0.43 11/01/2023       Anesthesia Plan    ASA Status:  3    NPO Status:  NPO Appropriate    Anesthesia Type: MAC.     - Reason for MAC: immobility needed, straight local not clinically adequate   Induction: Intravenous.   Maintenance: TIVA.        Consents    Anesthesia Plan(s) and associated risks, benefits, and realistic alternatives discussed. Questions answered and patient/representative(s) expressed understanding.     - Discussed: Risks, Benefits and Alternatives for BOTH SEDATION and the PROCEDURE were discussed     - Discussed with:  Patient      - Extended Intubation/Ventilatory Support Discussed: No.      - Patient is DNR/DNI Status: No     Use of blood products discussed: No .     Postoperative Care    Pain management: IV analgesics, Oral pain medications, Multi-modal analgesia.   PONV prophylaxis: Ondansetron (or other 5HT-3), Background Propofol Infusion " "    Comments:             Ricardo Jane MD    I have reviewed the pertinent notes and labs in the chart from the past 30 days and (re)examined the patient.  Any updates or changes from those notes are reflected in this note.              # Obesity: Estimated body mass index is 37.49 kg/m  as calculated from the following:    Height as of this encounter: 1.651 m (5' 5\").    Weight as of this encounter: 102.2 kg (225 lb 5 oz).      "

## 2023-12-11 NOTE — OP NOTE
GYN Operative Report    Ileana Thorne  [id number]  1995    Date of Procedure: 2023  Preoperative Diagnosis:   intrauterine pregnancy at 6w6d  Desire for pregnancy termination  Desire for nexplanon contraception  Postoperative Diagnosis: Same as above  Procedure:   Suction dilation and curettage   Insertion of Nexplanon contraceptive implant  Surgeon: Deepti Gonzalez MD  Assistant: none  Anesthesia: MAC and local  Complications: None apparent  EBL: 10 mL.  IVF: 200 mL crystalloid  UOP: not measured  Implant: Nexplanon SN [id number] / Exp 2025 May 29 / LOT [id number] in left upper arm    Pathology: Products of conception to pathology.    Findings: 6 week size anteverted uterus on bimanual exam. Normal external genitalia, normal vaginal mucosa, normal cervix.     Indications: Ileana Thorne is a 28 year old  with an IUP at 6w6d who desires pregnancy termination with suction dilation and curettage with sedation in OR. Additionally she desires Nexplanon for contraception.  Reviewed options with patient including medication  and continuing the pregnancy. Patient desires surgical . Reviewed risks of excessive bleeding, infection, uterine perforation, cervical laceration, Asherman's syndrome,incomplete procedure, injury to other organs.    Procedure:  The patient was taken to the OR where she was induced under MAC.     The patient lied on her back on the exam table with her non-dominant arm flexed at the elbow and externally rotated with wrist parallel to her ear. The insertion site was identified by measuring at the inner side of the non-dominant upper arm about 8-10cm above the medial epicondyle of the humerus. Large visible blood vessels were noted and avoided. The insertion site was cleansed with betadine x3.  The insertion area was anesthetized with 3 mL of  1% lidocaine.  The skin was stretched at insertion site and the nexplanon applicator inserted at 30 degrees.  The  position of the implant was confirmed immediately after insertion by palpation.  Bandage placed over implant site.  Pressure bandage placed with a sterile guaze to minimize bruising.      She was prepped and draped in the normal sterile fashion in low lithotomy position. A medium graves speculum was placed with good visualization of the cervix. The cervix grasped with a single tooth tenaculum. Lidocaine 1% was injected to achieve a paracervical block, 5mL at each 4 and 8 o'clock. The cervix was gently dilated using the Heger dilators to 7.6mm.    The 7mm curved suction cannula was placed without difficulty. Uterine contents were evacuated with suction at 60-70mm Hg. The suction cannula was rotated until a gritty texture was appreciated circumferentially. The suction was reintroduced to clear the uterus of any remaining blood and debris. All instruments were removed from the uterus. The tenaculum was removed, and noted to be  hemostatic at the puncture sites. The speculum was removed from the vagina. Bedside ultrasound demonstrated empty uterus with thin endometrial stripe at the end of the case.     Sponge, lap, instrument, and needle counts were correct x 2.   The patient tolerated the procedure well. She was woken up and transported to the PACU in stable condition.    Deepti Gonzalez MD

## 2023-12-11 NOTE — ANESTHESIA CARE TRANSFER NOTE
Patient: Ileana Thorne    Procedure: Procedure(s):  DILATION AND CURETTAGE, UTERUS, USING SUCTION  Implant hormone, Nexplanon       Diagnosis: Termination of pregnancy (fetus) [Z33.2]  Diagnosis Additional Information: No value filed.    Anesthesia Type:   MAC     Note:    Oropharynx: oropharynx clear of all foreign objects and spontaneously breathing  Level of Consciousness: awake  Oxygen Supplementation: room air    Independent Airway: airway patency satisfactory and stable  Dentition: dentition unchanged  Vital Signs Stable: post-procedure vital signs reviewed and stable  Report to RN Given: handoff report given  Patient transferred to: PACU    Handoff Report: Identifed the Patient, Identified the Reponsible Provider, Reviewed the pertinent medical history, Discussed the surgical course, Reviewed Intra-OP anesthesia mangement and issues during anesthesia, Set expectations for post-procedure period and Allowed opportunity for questions and acknowledgement of understanding      Vitals:  Vitals Value Taken Time   BP     Temp     Pulse     Resp     SpO2 97 % 12/11/23 1404   Vitals shown include unfiled device data.    Electronically Signed By: LACY Alonso CRNA  December 11, 2023  2:05 PM

## 2023-12-12 NOTE — TELEPHONE ENCOUNTER
Ileana Thorne is a 28 year old  who is POD 0 s/p suction D&C for pregnancy termination    Pain started about 1.5 hours ago  Stabbing pain in abdomen  Moderate bleeding, like a period or less  Just took ibuprofen 800mg and tylenol 650mg  She has not taken any of her regular medications today (abilify, flexeril, lyrica, desipramine) and states she does not have them, she is waiting for them to get delivered  Recommend she take 1 more tylenol, try ice or heat and lying down  If she is able to get the flexeril or lyrica we discussed that those will also help with pain  Discussed that if her pain continues to be severe or worsens I recommend going in to the ED for evaluation  Otherwise we will call her in the morning.    Deepti Gonzalez MD

## 2023-12-18 LAB
PATH REPORT.COMMENTS IMP SPEC: NORMAL
PATH REPORT.COMMENTS IMP SPEC: NORMAL
PATH REPORT.FINAL DX SPEC: NORMAL
PATH REPORT.GROSS SPEC: NORMAL
PATH REPORT.MICROSCOPIC SPEC OTHER STN: NORMAL
PATH REPORT.RELEVANT HX SPEC: NORMAL
PHOTO IMAGE: NORMAL

## 2023-12-19 ENCOUNTER — VIRTUAL VISIT (OUTPATIENT)
Dept: ENDOCRINOLOGY | Facility: CLINIC | Age: 28
End: 2023-12-19
Payer: COMMERCIAL

## 2023-12-19 VITALS — HEIGHT: 65 IN | BODY MASS INDEX: 38.82 KG/M2 | WEIGHT: 233 LBS

## 2023-12-19 DIAGNOSIS — F41.1 GENERALIZED ANXIETY DISORDER: ICD-10-CM

## 2023-12-19 DIAGNOSIS — F32.2 CURRENT SEVERE EPISODE OF MAJOR DEPRESSIVE DISORDER WITHOUT PSYCHOTIC FEATURES WITHOUT PRIOR EPISODE (H): Primary | ICD-10-CM

## 2023-12-19 DIAGNOSIS — E66.812 CLASS 2 OBESITY WITHOUT SERIOUS COMORBIDITY WITH BODY MASS INDEX (BMI) OF 36.0 TO 36.9 IN ADULT, UNSPECIFIED OBESITY TYPE: ICD-10-CM

## 2023-12-19 PROCEDURE — 99203 OFFICE O/P NEW LOW 30 MIN: CPT | Mod: VID | Performed by: INTERNAL MEDICINE

## 2023-12-19 ASSESSMENT — PAIN SCALES - GENERAL: PAINLEVEL: MODERATE PAIN (5)

## 2023-12-19 NOTE — NURSING NOTE
Is the patient currently in the state of [place]? YES    Visit mode:VIDEO    If the visit is dropped, the patient can be reconnected by: VIDEO VISIT: Text to cell phone:   Telephone Information:   Mobile [phone]       Will anyone else be joining the visit? NO  (If patient encounters technical issues they should call [phone]    How would you like to obtain your AVS? MyChart    Are changes needed to the allergy or medication list? No, Pt stated no changes to allergies, and Pt stated no med changes    Reason for visit: Consult ([facility])    Camille CORTEZ    Pt stated her current meds make her hungry, she stated she doesn't feel hunger signal, stated she experiences constipation and feels like the laxatives she is prescribed don't help    Pt requested mental health referral, ran out of time to complete PHQ9.
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
## 2023-12-19 NOTE — PROGRESS NOTES
Virtual Visit Details    Type of service:  Video Visit     Originating Location (pt. Location): Home    Distant Location (provider location):  Off-site  Platform used for Video Visit: Christiano

## 2023-12-19 NOTE — PATIENT INSTRUCTIONS
Start Zepbound 2.5 mg weekly  Refer nutritionist    See Lauren Bloch, PharmD in 2 month(s)  See Dr.Tasma CARVER in 4 month(s)     If you have any questions, please do not hesitate to call Weight management clinic at [phone] or [phone].  If you need to fax, please fax to [phone].    Sincerely,    Jaciel Gamez MD  Endocrinology

## 2023-12-20 ENCOUNTER — MYC MEDICAL ADVICE (OUTPATIENT)
Dept: PSYCHIATRY | Facility: CLINIC | Age: 28
End: 2023-12-20
Payer: COMMERCIAL

## 2023-12-21 DIAGNOSIS — F41.8 MIXED ANXIETY DEPRESSIVE DISORDER: ICD-10-CM

## 2023-12-21 DIAGNOSIS — G89.29 OTHER CHRONIC PAIN: ICD-10-CM

## 2023-12-21 RX ORDER — PREGABALIN 100 MG/1
100 CAPSULE ORAL 3 TIMES DAILY
Qty: 180 CAPSULE | Refills: 1 | OUTPATIENT
Start: 2023-12-21

## 2023-12-22 ENCOUNTER — MYC REFILL (OUTPATIENT)
Dept: FAMILY MEDICINE | Facility: CLINIC | Age: 28
End: 2023-12-22
Payer: COMMERCIAL

## 2023-12-22 DIAGNOSIS — F41.8 MIXED ANXIETY DEPRESSIVE DISORDER: ICD-10-CM

## 2023-12-22 DIAGNOSIS — G89.29 OTHER CHRONIC PAIN: ICD-10-CM

## 2023-12-22 RX ORDER — CLONAZEPAM 2 MG/1
2 TABLET ORAL 2 TIMES DAILY PRN
Qty: 60 TABLET | Refills: 0 | OUTPATIENT
Start: 2023-12-22

## 2023-12-22 RX ORDER — CLONAZEPAM 2 MG/1
2 TABLET ORAL 2 TIMES DAILY PRN
Qty: 60 TABLET | Refills: 1 | OUTPATIENT
Start: 2023-12-22

## 2023-12-22 RX ORDER — PREGABALIN 100 MG/1
100 CAPSULE ORAL 3 TIMES DAILY
Qty: 180 CAPSULE | Refills: 1 | OUTPATIENT
Start: 2023-12-22

## 2023-12-22 RX ORDER — CLONAZEPAM 2 MG/1
2 TABLET ORAL DAILY PRN
Qty: 15 TABLET | Refills: 0 | Status: SHIPPED | OUTPATIENT
Start: 2023-12-22 | End: 2024-01-08

## 2023-12-22 NOTE — TELEPHONE ENCOUNTER
Health Call Center    Phone Message    May a detailed message be left on voicemail: yes     Reason for Call: Medication Refill Request    Has the patient contacted the pharmacy for the refill? Yes   Name of medication being requested: Clonazepam 2mg  Provider who prescribed the medication: Dr. Marte  Pharmacy:    [facility] [place] [place] [id number] - [place], [place] - [address]  Date medication is needed: Patient has one or two tablets remaining     Action Taken: Message routed to:  Other: [facility] Nurse Thermal    Travel Screening: Not Applicable

## 2023-12-22 NOTE — TELEPHONE ENCOUNTER
[facility]  Critical care    [place] Patient Status:  Inpatient    1935 MRN [id number]   [facility] 6NE-A Attending Gage Mukherjee,    Hosp Day # 4 PCP Quynh Speed     Subjective:  POD #1 surgical MVR with 29 mm bi Vincent Marte MD   to Me   JA    12/22/23  3:02 PM   Hi Asha,       We never took ownership of this medication in the addiction clinic. She should send the request to PCP, which it looks like she did as well. We will work to address her symptoms so the PCP can reduce the dose and get off of it in the future.    Vincent Marte MD    Follow up:     Reached out to patient and to relay above information. No answer at number provided. LVM, updating to reach out to PCP for refill on Klonopin. Clinic number provided for further questions.    S/P MVR (mitral valve replacement)  Active Problems:    Acute congestive heart failure, unspecified heart failure type ([address]    Acute pulmonary embolism without acute cor pulmonale, unspecified pulmonary embolism type (HCC)    Severe mitral valve regurgita

## 2023-12-22 NOTE — TELEPHONE ENCOUNTER
Last seen: 11/8  RTC: 2 weeks   Cancel: none   No-show: 11/22  Next appt: 1/3    Incoming refill from patient via phone    Disp Refills Start End ROSELYN    clonazePAM (KLONOPIN) 2 MG tablet 60 tablet 1 10/24/2023 -- No   Sig - Route: Take 1 tablet (2 mg) by mouth 2 times daily as needed for anxiety - Oral   Sent to pharmacy as: clonazePAM 2 MG Oral Tablet (klonoPIN)   Class: E-Prescribe   Order: [id number]   E-Prescribing Status: Receipt confirmed by pharmacy (10/24/2023 12:42 PM CDT)   Per  last refilled: 11/22 #60, 10/24 #60, 10/4 #30    Will route to provider for approval

## 2023-12-27 ENCOUNTER — TELEPHONE (OUTPATIENT)
Dept: PSYCHIATRY | Facility: CLINIC | Age: 28
End: 2023-12-27
Payer: COMMERCIAL

## 2023-12-27 NOTE — CONFIDENTIAL NOTE
Writer reached out to patient following [employer] message sent today that expressed frustration regarding her medications.  Writer explained that patient's clonazepam and lyrica are prescribed by a different provider.    Patient states that she is struggling and has triggers this time of year.  She did not elaborate on the details.  She states that she doesn't want to get out of bed or go outside due to her depression.  She states that the clinic is not helping her with medication - and we want to take away medication instead. She denies any safety concerns.    Patient states that she also has an appointment with another psychiatrist.  Writer explained that she should be only treating with one psychiatrist - patient agreed and asked to cancel her appointment on 1/3 with Dr. Marte.  Patient is also scheduled for PCP appointment on 1/9.    Writer discussed emergency services if patient were to be suicidal. Community resources sent via [employer] along with TIPP information.

## 2024-01-02 ENCOUNTER — MYC REFILL (OUTPATIENT)
Dept: FAMILY MEDICINE | Facility: CLINIC | Age: 29
End: 2024-01-02
Payer: COMMERCIAL

## 2024-01-02 DIAGNOSIS — F41.8 MIXED ANXIETY DEPRESSIVE DISORDER: ICD-10-CM

## 2024-01-02 RX ORDER — CLONAZEPAM 2 MG/1
2 TABLET ORAL DAILY PRN
Qty: 15 TABLET | Refills: 0 | OUTPATIENT
Start: 2024-01-02

## 2024-01-03 ENCOUNTER — MYC MEDICAL ADVICE (OUTPATIENT)
Dept: FAMILY MEDICINE | Facility: CLINIC | Age: 29
End: 2024-01-03
Payer: COMMERCIAL

## 2024-01-05 DIAGNOSIS — F41.8 MIXED ANXIETY DEPRESSIVE DISORDER: ICD-10-CM

## 2024-01-07 ENCOUNTER — MYC REFILL (OUTPATIENT)
Dept: FAMILY MEDICINE | Facility: CLINIC | Age: 29
End: 2024-01-07
Payer: COMMERCIAL

## 2024-01-07 DIAGNOSIS — F41.8 MIXED ANXIETY DEPRESSIVE DISORDER: ICD-10-CM

## 2024-01-08 DIAGNOSIS — F41.8 MIXED ANXIETY DEPRESSIVE DISORDER: ICD-10-CM

## 2024-01-08 RX ORDER — CLONAZEPAM 2 MG/1
2 TABLET ORAL DAILY PRN
Qty: 15 TABLET | Refills: 0 | OUTPATIENT
Start: 2024-01-08

## 2024-01-08 RX ORDER — CLONAZEPAM 2 MG/1
2 TABLET ORAL DAILY PRN
Qty: 4 TABLET | Refills: 0 | Status: SHIPPED | OUTPATIENT
Start: 2024-01-08 | End: 2024-01-09

## 2024-01-08 ASSESSMENT — PATIENT HEALTH QUESTIONNAIRE - PHQ9: SUM OF ALL RESPONSES TO PHQ QUESTIONS 1-9: 19

## 2024-01-08 NOTE — COMMUNITY RESOURCES LIST (ENGLISH)
01/08/2024   [facility]  N/A  For questions about this resource list or additional care needs, please contact your primary care clinic or care manager.  Phone: [phone]   Email: N/A   Address: [address] [place] [id number]   Hours: N/A        Financial Stability       Rent and mortgage payment assistance  1  [facility] Out[facility] Distance: 1.35 miles      In-Person, Phone/Virtual   [address] [place] [address]  Language: English, Cymro  Hours: Mon - Fri 8:00 AM - 4:30 PM  Fees: Insurance   Phone: (366) 801-4274 Email: roots@[employer] Website: https://[employer]/roots/     2  [facility] Finance Agency - [address] Supportive Housing Project-Based Rental Assistance Program ([address] PRA) Distance: 4.01 miles      Phone/Virtual   [address] [facility] [address] [place] [address]  Language: English  Hours: Mon - Fri 8:00 AM - 5:00 PM Appt. Only  Fees: Free   Phone: (444) 798-8124 Email: [place].housing@[facility]. Website: https://www.[facility] SouthNear[facility] Systems.gov/index.html     Utility payment assistance  3  [employer] ([facility]) [facility] - Energy Assistance Distance: 1.18 miles      Phone/Virtual   [address] [place] [address]  Language: English, Hmong, Equatorial Guinean, Cymro  Hours: Mon - Fri 8:00 AM - 4:30 PM  Fees: Free   Phone: (922) 851-3262 Email: info@caprw.org Website: http://www.caprw.org/     4  [facility] Distance: 3.44 miles      Phone/Virtual   [address] [place] [address]  Language: English, Hmong, Pashto, Cymro  Hours: Mon - Fri 10:00 AM - 3:00 PM  Fees: Free   Phone: (384) 748-3369 Email: Ivonne@[facility].salvationarmy.org Website: http://salvationarmynorth.org/community/[facility]/          Food and Nutrition       Food pantry  5  [facility] Distance: 0.25 miles      [facility]    [address] [place] [address]  Language: English  Hours: Mon - Fri 12:00 PM - 1:00 PM  Fees: Free   Phone: (454) 751-4691 Email: info@[employer] Website: https://www.[employer]think[employer] St. Louis[employer]/locations/[facility]_ymca     6  [facility] Food Shelf Distance: 0.3 miles      In-Person, Delivery   [address] [place] [address]  Language: English, Mosotho  Hours: Mon - Fri 10:00 AM - 12:00 PM , Mon - Tue 1:30 PM - 3:30 PM , Wed 5:00 PM - 7:00 PM , Thu - Fri 1:30 PM - 3:30 PM  Fees: Free   Phone: (101) 897-8228 Email: info@[employer] Website: https://Madison HealthDunwello/food-shelves/     SNAP application assistance  7  [employer] ([facility]) [facility] Distance: 1.18 miles      Phone/Virtual   [address] [place] [address]  Language: English  Hours: Mon - Fri 8:00 AM - 4:30 PM  Fees: Free   Phone: (263) 157-1010 Email: info@capr.org Website: http://www.caprw.org/     8  [facility] Extension - SNAP Ed - SNAP Ed - SNAP application assistance Distance: 2.14 miles      In-Person   [address] [place] [address]  Language: English, Hmong, Oromo, Cape Verdean, Mosotho  Hours: Mon - Fri 9:00 AM - 5:00 PM Appt. Only  Fees: Free   Phone: (843) 565-7647 Email: mnext@[facility] Website: https://extension.[facility]/nutrition-education/snap-ed-educational-offerings     Soup kitchen or free meals  9  [facility] [employer] [place] Distance: 0.47 miles      Pickup   [address] [place] [address]  Language: English  Hours: Mon 12:00 PM - 2:00 PM , Wed 12:00 PM - 2:00 PM  Fees: Free   Phone: (131) 416-1720 Ext.4 Email: info@[employer].[place] [employer] Website: http://www.[employer].[place] PUD     10  [facility] [place] [facility] - Free Summer Meals Distance: 1.56 miles      In-Person   [address] [place] [address]  Language: English  Hours: Mon - Fri 12:00 PM - 1:00 PM , Mon - Fri 3:00 PM - 4:00 PM  Fees: Free    Phone: (541) 242-6191 Email: willie@.[facility]. Website: https://www.[facility]/departments/eastman-recreation/[facility]          Transportation       Free or low-cost transportation  11  [employer] Distance: 1.21 miles      In-Person   [address] [place] [address]  Language: English, Burkinan  Hours: Mon 9:00 AM - 5:00 PM , Tue 9:30 AM - 7:00 PM , Wed 9:00 AM - 5:00 PM , Thu 9:30 AM - 7:00 PM , Fri 9:00 AM - 5:00 PM  Fees: Free   Phone: (108) 383-6611 Email: emory@[employer] Website: http://www.[employer]     12  [employer] Distance: 4.17 miles      In-Person   [address] [place] [address]  Language: English  Hours: Mon - Fri 8:00 AM - 3:00 PM  Fees: Free   Phone: (266) 810-8308 Ext.14 Email: neighborhood@[facility] Website: http://www.[facility].[employer] Systems     Transportation to medical appointments  13  [facility] - Non-Emergency Medical Transportation Distance: 3.52 miles      In-Person   [address] [place] [address]  Language: English  Hours: Mon - Fri 8:00 AM - 4:00 PM Appt. Only  Fees: Self Pay   Phone: (609) 221-2190 Website: http://www.[facility].org/Medical-Services/Emergency-medical-services/Non-emergency-transportation/     14  [facility] Transportation Programs - Non-Emergency Medical Transportation Distance: 5.12 miles      In-Person   [address] [facility] [address] [place] [address]  Language: English, Canadian, Burkinan  Hours: Mon - Fri 7:00 AM - 6:00 PM  Fees: Insurance   Phone: (855) 631-8086 Ext.4464 Email: osei@[employer] Website: http://www.[facility].net/[place]/          Important Numbers & Websites       Emergency Services   911  City Services   311  Poison Control   (545) 448-1879  Suicide Prevention Lifeline   (464) 940-3930 (TALK)  Child Abuse Hotline   (107) 176-5372 (4-A-Child)  Sexual Assault Hotline   (814) 979-8768 (HOPE)  [facility]  Safeline   (349) 712-2328 (RUNAWAY)  All-Options Talkline   (167) 787-9870  Substance Abuse Referral   (610) 893-9472 (HELP)

## 2024-01-09 ENCOUNTER — VIRTUAL VISIT (OUTPATIENT)
Dept: FAMILY MEDICINE | Facility: CLINIC | Age: 29
End: 2024-01-09
Payer: COMMERCIAL

## 2024-01-09 DIAGNOSIS — F19.10 POLYSUBSTANCE ABUSE (H): ICD-10-CM

## 2024-01-09 DIAGNOSIS — Z79.899 CONTROLLED SUBSTANCE AGREEMENT SIGNED: ICD-10-CM

## 2024-01-09 DIAGNOSIS — F41.8 MIXED ANXIETY DEPRESSIVE DISORDER: Primary | ICD-10-CM

## 2024-01-09 DIAGNOSIS — F43.10 PTSD (POST-TRAUMATIC STRESS DISORDER): ICD-10-CM

## 2024-01-09 PROCEDURE — 99214 OFFICE O/P EST MOD 30 MIN: CPT

## 2024-01-09 RX ORDER — DESIPRAMINE HYDROCHLORIDE 150 MG/1
150 TABLET ORAL AT BEDTIME
Qty: 60 TABLET | Refills: 0 | Status: SHIPPED | OUTPATIENT
Start: 2024-01-09 | End: 2024-02-26

## 2024-01-09 RX ORDER — CLONAZEPAM 2 MG/1
2 TABLET ORAL 2 TIMES DAILY PRN
Qty: 60 TABLET | Refills: 1 | Status: SHIPPED | OUTPATIENT
Start: 2024-01-09 | End: 2024-03-06

## 2024-01-09 ASSESSMENT — ANXIETY QUESTIONNAIRES
6. BECOMING EASILY ANNOYED OR IRRITABLE: NEARLY EVERY DAY
7. FEELING AFRAID AS IF SOMETHING AWFUL MIGHT HAPPEN: MORE THAN HALF THE DAYS
1. FEELING NERVOUS, ANXIOUS, OR ON EDGE: NEARLY EVERY DAY
7. FEELING AFRAID AS IF SOMETHING AWFUL MIGHT HAPPEN: MORE THAN HALF THE DAYS
IF YOU CHECKED OFF ANY PROBLEMS ON THIS QUESTIONNAIRE, HOW DIFFICULT HAVE THESE PROBLEMS MADE IT FOR YOU TO DO YOUR WORK, TAKE CARE OF THINGS AT HOME, OR GET ALONG WITH OTHER PEOPLE: EXTREMELY DIFFICULT
8. IF YOU CHECKED OFF ANY PROBLEMS, HOW DIFFICULT HAVE THESE MADE IT FOR YOU TO DO YOUR WORK, TAKE CARE OF THINGS AT HOME, OR GET ALONG WITH OTHER PEOPLE?: EXTREMELY DIFFICULT
GAD7 TOTAL SCORE: 17
4. TROUBLE RELAXING: MORE THAN HALF THE DAYS
5. BEING SO RESTLESS THAT IT IS HARD TO SIT STILL: NEARLY EVERY DAY
GAD7 TOTAL SCORE: 17
2. NOT BEING ABLE TO STOP OR CONTROL WORRYING: MORE THAN HALF THE DAYS
3. WORRYING TOO MUCH ABOUT DIFFERENT THINGS: MORE THAN HALF THE DAYS

## 2024-01-09 ASSESSMENT — PATIENT HEALTH QUESTIONNAIRE - PHQ9
10. IF YOU CHECKED OFF ANY PROBLEMS, HOW DIFFICULT HAVE THESE PROBLEMS MADE IT FOR YOU TO DO YOUR WORK, TAKE CARE OF THINGS AT HOME, OR GET ALONG WITH OTHER PEOPLE: EXTREMELY DIFFICULT
SUM OF ALL RESPONSES TO PHQ QUESTIONS 1-9: 19

## 2024-01-09 ASSESSMENT — ENCOUNTER SYMPTOMS: NERVOUS/ANXIOUS: 1

## 2024-01-09 NOTE — COMMUNITY RESOURCES LIST (ENGLISH)
01/09/2024   [facility]  N/A  For questions about this resource list or additional care needs, please contact your primary care clinic or care manager.  Phone: [phone]   Email: N/A   Address: [address] [place] [address]   Hours: N/A        Financial Stability       Rent and mortgage payment assistance  1  [facility] Distance: 1.35 miles      In-Person, Phone/Virtual   [address] [place] [address]  Language: English, Sao Tomean  Hours: Mon - Fri 8:00 AM - 4:30 PM  Fees: Insurance   Phone: (298) 491-5212 Email: roots@[employer] Website: https://[employer]/roots/     2  [facility] Finance Agency - Section 811 Supportive Housing Project-Based Rental Assistance Program (811 PRA) Distance: 4.01 miles      Phone/Virtual   [address] [facility] [address] [place] [address]  Language: English  Hours: Mon - Fri 8:00 AM - 5:00 PM Appt. Only  Fees: Free   Phone: (502) 423-1624 Email: mn.housing@[facility]. Website: https://www.[facility] HospitalSponsorHub.gov/index.html     Utility payment assistance  3  Community Action Partnership ([facility]) [facility] - Energy Assistance Distance: 1.18 miles      Phone/Virtual   [address] [place] [address]  Language: English, Hmong, Tajik, Sao Tomean  Hours: Mon - Fri 8:00 AM - 4:30 PM  Fees: Free   Phone: (775) 545-3765 Email: info@caprw.org Website: http://www.caprw.org/     4  [facility] and Service [facility] Distance: 3.44 miles      Phone/Virtual   [address] [place] [address]  Language: English, Hmong, Italian, Sao Tomean  Hours: Mon - Fri 10:00 AM - 3:00 PM  Fees: Free   Phone: (927) 900-9669 Email: Ivonne@[facility].salvationarmy.org Website: http://salvationarmynorth.org/community/[facility]/          Food and Nutrition       Food pantry  5  [facility] the [facility] Distance: 0.25 miles      [facility]    [address] [place] [address]  Language: English  Hours: Mon - Fri 12:00 PM - 1:00 PM  Fees: Free   Phone: (427) 742-1725 Email: info@[employer] Website: https://www.DatavolutionThree Rivers HealthcareFluidinova - Engenharia de Fluidos/locations/[facility]_[place]_ymca     6  [facility] Food Shelf Distance: 0.3 miles      In-Person, Delivery   [address] [place] [address]  Language: English, Guatemalan  Hours: Mon - Fri 10:00 AM - 12:00 PM , Mon - Tue 1:30 PM - 3:30 PM , Wed 5:00 PM - 7:00 PM , Thu - Fri 1:30 PM - 3:30 PM  Fees: Free   Phone: (300) 646-8005 Email: info@[employer] Website: https://[facility] HospitalPriccut/food-shelves/     SNAP application assistance  7  Community Action Partnership ([facility]) [facility] Distance: 1.18 miles      Phone/Virtual   [address] [place] [address]  Language: English  Hours: Mon - Fri 8:00 AM - 4:30 PM  Fees: Free   Phone: (896) 992-9558 Email: info@capr.org Website: http://www.caprw.org/     8  [facility] Extension - SNAP Ed - SNAP Ed - SNAP application assistance Distance: 2.14 miles      In-Person   [address] [place] [address]  Language: English, Hmong, Oromo, Bolivian, Guatemalan  Hours: Mon - Fri 9:00 AM - 5:00 PM Appt. Only  Fees: Free   Phone: (596) 445-9522 Email: mnext@[facility] Website: https://extension.[facility]/nutrition-education/snap-ed-educational-offerings     Soup kitchen or free meals  9  [employer] [place] Distance: 0.47 miles      Pickup   [address] [place] [address]  Language: English  Hours: Mon 12:00 PM - 2:00 PM , Wed 12:00 PM - 2:00 PM  Fees: Free   Phone: (960) 673-1190 Ext.4 Email: info@[employer]apUp Website: http://www.[employer].SnapUp     10  [facility] [place] [facility] - Free Summer Meals Distance: 1.56 miles      In-Person   [address] [place] [address]  Language: English  Hours: Mon - Fri 12:00 PM - 1:00 PM , Mon - Fri 3:00 PM - 4:00 PM  Fees: Free    Phone: (751) 924-7792 Email: willie@.[facility]. Website: https://www.[facility]/departments/eastman-recreation/[place]          Transportation       Free or low-cost transportation  11  [employer] Distance: 1.21 miles      In-Person   [address] [place] [address]  Language: English, Monegasque  Hours: Mon 9:00 AM - 5:00 PM , Tue 9:30 AM - 7:00 PM , Wed 9:00 AM - 5:00 PM , Thu 9:30 AM - 7:00 PM , Fri 9:00 AM - 5:00 PM  Fees: Free   Phone: (992) 398-3225 Email: emory@[employer] Website: http://www.Balandras     12  [facility] Distance: 4.17 miles      In-Person   [address] [place] [address]  Language: English  Hours: Mon - Fri 8:00 AM - 3:00 PM  Fees: Free   Phone: (389) 301-5412 Ext.14 Email: neighborhood@[facility] CenterBuzzni Website: http://www.[facility].Eat     Transportation to medical appointments  13  [facility] - Non-Emergency Medical Transportation Distance: 3.52 miles      In-Person   [address] [place] [address]  Language: English  Hours: Mon - Fri 8:00 AM - 4:00 PM Appt. Only  Fees: Self Pay   Phone: (133) 540-7970 Website: http://www.[facility].org/Medical-Services/Emergency-medical-services/Non-emergency-transportation/     14  [facility] Transportation Programs - Non-Emergency Medical Transportation Distance: 5.12 miles      In-Person   [address] [place] [address]  Language: English, South African, Monegasque  Hours: Mon - Fri 7:00 AM - 6:00 PM  Fees: Insurance   Phone: (901) 468-5396 Ext.4463 Email: osei@[employer].net Website: http://www.[facility].net/[place]/          Important Numbers & Websites       Emergency Services   911  City Services   311  Poison Control   (633) 397-8447  Suicide Prevention Lifeline   (353) 350-9415 (TALK)  Child Abuse Hotline   (502) 678-7071 (4-A-Child)  Sexual Assault Hotline   (814) 739-9849 (HOPE)  [facility]  Safeline   (497) 722-5004 (RUNAWAY)  All-Options Talkline   (195) 253-9178  Substance Abuse Referral   (879) 564-8896 (HELP)

## 2024-01-09 NOTE — COMMUNITY RESOURCES LIST (ENGLISH)
01/09/2024   [facility]  N/A  For questions about this resource list or additional care needs, please contact your primary care clinic or care manager.  Phone: [phone]   Email: N/A   Address: [address] [place] [address]   Hours: N/A        Financial Stability       Rent and mortgage payment assistance  1  [facility] Distance: 1.35 miles      In-Person, Phone/Virtual   [address] [place] [address]  Language: English, Filipino  Hours: Mon - Fri 8:00 AM - 4:30 PM  Fees: Insurance   Phone: (975) 807-3264 Email: roots@[employer] Website: https://[employer]/roots/     2  [facility] Finance Agency - Section 811 Supportive Housing Project-Based Rental Assistance Program (811 PRA) Distance: 4.01 miles      Phone/Virtual   [address] [facility] [address] [place] [address]  Language: English  Hours: Mon - Fri 8:00 AM - 5:00 PM Appt. Only  Fees: Free   Phone: (652) 649-2690 Email: mn.housing@[facility]. Website: https://www.[facility] HospitalTwelve.gov/index.html     Utility payment assistance  3  Community Action Partnership ([facility]) [facility] - Energy Assistance Distance: 1.18 miles      Phone/Virtual   [address] [place] [address]  Language: English, Hmong, Eritrean, Filipino  Hours: Mon - Fri 8:00 AM - 4:30 PM  Fees: Free   Phone: (141) 642-2965 Email: info@caprw.org Website: http://www.caprw.org/     4  [facility] and Service [facility] Distance: 3.44 miles      Phone/Virtual   [address] [place] [address]  Language: English, Hmong, Wolof, Filipino  Hours: Mon - Fri 10:00 AM - 3:00 PM  Fees: Free   Phone: (520) 814-2516 Email: Ivonne@[facility].salvationarmy.org Website: http://salvationarmynorth.org/community/[facility]-[address]th-street/          Food and Nutrition       Food pantry  5  [facility] the [facility] Distance: 0.25 miles      [facility]    [address] [place] [address]  Language: English  Hours: Mon - Fri 12:00 PM - 1:00 PM  Fees: Free   Phone: (199) 323-2881 Email: info@[employer] Website: https://www.[facility] Community HospitalHousekeep/locations/[facility]_[place]_ymca     6  [facility] Food Shelf Distance: 0.3 miles      In-Person, Delivery   [address] [place] [address]  Language: English, Andorran  Hours: Mon - Fri 10:00 AM - 12:00 PM , Mon - Tue 1:30 PM - 3:30 PM , Wed 5:00 PM - 7:00 PM , Thu - Fri 1:30 PM - 3:30 PM  Fees: Free   Phone: (577) 480-4795 Email: info@[employer]asso Website: https://[facility] HospitalSolar & Environmental Technologies/food-shelves/     SNAP application assistance  7  Community Action Partnership ([facility]) [facility] Distance: 1.18 miles      Phone/Virtual   [address] [place] [address]  Language: English  Hours: Mon - Fri 8:00 AM - 4:30 PM  Fees: Free   Phone: (860) 797-8104 Email: info@capr.org Website: http://www.caprw.org/     8  [facility] Extension - SNAP Ed - SNAP Ed - SNAP application assistance Distance: 2.14 miles      In-Person   [address] [place] [address]  Language: English, Hmong, Oromo, Macedonian, Andorran  Hours: Mon - Fri 9:00 AM - 5:00 PM Appt. Only  Fees: Free   Phone: (635) 320-6576 Email: mnext@[facility] Website: https://extension.[facility]/nutrition-education/snap-ed-educational-offerings     Soup kitchen or free meals  9  [employer] [place] Distance: 0.47 miles      Pickup   [address] [place] [address]  Language: English  Hours: Mon 12:00 PM - 2:00 PM , Wed 12:00 PM - 2:00 PM  Fees: Free   Phone: (689) 451-8931 Ext.4 Email: info@[employer] Website: http://www.[employer].Prospectvision     10  [facility] [place] [facility] - Free Summer Meals Distance: 1.56 miles      In-Person   [address] [place] [address]  Language: English  Hours: Mon - Fri 12:00 PM - 1:00 PM , Mon - Fri 3:00 PM - 4:00 PM  Fees: Free    Phone: (273) 841-1936 Email: willie@.[facility]. Website: https://www.[facility]/departments/eastman-recreation/[place]-[facility]          Transportation       Free or low-cost transportation  11  [employer] Distance: 1.21 miles      In-Person   [address] [place] [address]  Language: English, Bahraini  Hours: Mon 9:00 AM - 5:00 PM , Tue 9:30 AM - 7:00 PM , Wed 9:00 AM - 5:00 PM , Thu 9:30 AM - 7:00 PM , Fri 9:00 AM - 5:00 PM  Fees: Free   Phone: (380) 398-1950 Email: emory@[employer] Website: http://www.SheZoom     12  [facility] Distance: 4.17 miles      In-Person   [address] [place] [address]  Language: English  Hours: Mon - Fri 8:00 AM - 3:00 PM  Fees: Free   Phone: (898) 111-5788 Ext.14 Email: neighborhood@[facility] HospitalEthicalSuperstore.Com Website: http://www.[facility].[employer]     Transportation to medical appointments  13  [facility] Transportation - Non-Emergency Medical Transportation Distance: 3.52 miles      In-Person   [address] [place] [address]  Language: English  Hours: Mon - Fri 8:00 AM - 4:00 PM Appt. Only  Fees: Self Pay   Phone: (775) 327-9696 Website: http://www.[facility].org/Medical-Services/Emergency-medical-services/Non-emergency-transportation/     14  [facility] Transportation Programs - Non-Emergency Medical Transportation Distance: 5.12 miles      In-Person   [address] [facility] [address] [place] [address]  Language: English, Norwegian, Bahraini  Hours: Mon - Fri 7:00 AM - 6:00 PM  Fees: Insurance   Phone: (875) 231-5243 Ext.4426 Email: osei@nfon.net Website: http://www.[facility] HospitalWhatâ€™s On Foodie.net/[place]/          Important Numbers & Websites       Emergency Services   911  City Services   311  Poison Control   (168) 932-9569  Suicide Prevention Lifeline   (353) 707-2052 (TALK)  Child Abuse Hotline   (494) 840-1328 (4-A-Child)  Sexual Assault Hotline   (694) 670-2634 (HOPE)  [facility]  Safeline   (179) 625-9128 (RUNAWAY)  All-Options Talkline   (251) 746-9198  Substance Abuse Referral   (605) 436-4459 (HELP)
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
## 2024-01-09 NOTE — COMMUNITY RESOURCES LIST (ENGLISH)
01/09/2024   [facility]  N/A  For questions about this resource list or additional care needs, please contact your primary care clinic or care manager.  Phone: [phone]   Email: N/A   Address: [address] [place] [address]   Hours: N/A        Financial Stability       Rent and mortgage payment assistance  1  [facility] Out[facility] Distance: 1.35 miles      In-Person, Phone/Virtual   [address] [place] [address]  Language: English, Macanese  Hours: Mon - Fri 8:00 AM - 4:30 PM  Fees: Insurance   Phone: (884) 549-9371 Email: roots@[employer] Website: https://Lab[employer]s/roots/     2  [facility] [employer] - Section 811 Supportive Housing Project-Based Rental Assistance Program (811 PRA) Distance: 4.01 miles      Phone/Virtual   [address] [facility] [address] [place] [address]  Language: English  Hours: Mon - Fri 8:00 AM - 5:00 PM Appt. Only  Fees: Free   Phone: (836) 313-7144 Email: [place].housing@[facility]. Website: https://www.[facility] Center[facility] Bro.gov/index.html     Utility payment assistance  3  [employer] ([facility]) [facility] - Energy Assistance Distance: 1.18 miles      Phone/Virtual   [address] [place] [address]  Language: English, Hmong, Dominican, Macanese  Hours: Mon - Fri 8:00 AM - 4:30 PM  Fees: Free   Phone: (669) 431-8958 Email: info@caprw.org Website: http://www.caprw.org/     4  [facility] Distance: 3.44 miles      Phone/Virtual   [address] [place] [address]  Language: English, Hmong, Kazakh, Macanese  Hours: Mon - Fri 10:00 AM - 3:00 PM  Fees: Free   Phone: (306) 952-6750 Email: Ivonne@[facility].salvationarmy.org Website: http://salvationarmynorth.org/community/\[facility]\""-[address]/          Food and Nutrition       Food pantry  5  [facility] Distance: 0.25 miles      [facility]    [address] [place] [address]  Language: English  Hours: Mon - Fri 12:00 PM - 1:00 PM  Fees: Free   Phone: (642) 449-2559 Email: info@[employer] Website: https://www.Media[facility] HospitalpluriSelect/locations/[facility]_ymca     6  [facility] Distance: 0.3 miles      In-Person, Delivery   [address] [place] [address]  Language: English, Sierra Leonean  Hours: Mon - Fri 10:00 AM - 12:00 PM , Mon - Tue 1:30 PM - 3:30 PM , Wed 5:00 PM - 7:00 PM , Thu - Fri 1:30 PM - 3:30 PM  Fees: Free   Phone: (326) 642-4120 Email: info@[employer] Website: https://[facility]360incentives.com/food-shelves/     SNAP application assistance  7  [employer] ([facility]) [facility][note 2] Distance: 1.18 miles      Phone/Virtual   [address] [place] [address]  Language: English  Hours: Mon - Fri 8:00 AM - 4:30 PM  Fees: Free   Phone: (450) 888-7816 Email: info@capr.org Website: http://www.caprw.org/     8  [facility] - SNAP Ed - SNAP Ed - SNAP application assistance Distance: 2.14 miles      In-Person   [address] [place] [address]  Language: English, Hmong, Oromo, Dominican, Sierra Leonean  Hours: Mon - Fri 9:00 AM - 5:00 PM Appt. Only  Fees: Free   Phone: (914) 915-8279 Email: mnext@[facility] Website: https://extension.[facility]/nutrition-education/snap-ed-educational-offerings     Soup kitchen or free meals  9  [employer] [place] Distance: 0.47 miles      Pickup   [address] [place] [address]  Language: English  Hours: Mon 12:00 PM - 2:00 PM , Wed 12:00 PM - 2:00 PM  Fees: Free   Phone: (810) 496-6699 Ext.4 Email: info@[employer] Website: http://www.[employer]Media     10  [facility] [place] [facility] - Free Summer Meals Distance: 1.56 miles      In-Person   [address] [place] [address]  Language: English  Hours: Mon - Fri 12:00 PM - 1:00 PM , Mon - Fri 3:00 PM - 4:00 PM  Fees: Free    Phone: (983) 412-4826 Email: willie@.[facility]. Website: https://www.[facility]/departments/eastman-recreation/[facility]          Transportation       Free or low-cost transportation  11  [employer] Distance: 1.21 miles      In-Person   [address] [place] [address]  Language: English, Hungarian  Hours: Mon 9:00 AM - 5:00 PM , Tue 9:30 AM - 7:00 PM , Wed 9:00 AM - 5:00 PM , Thu 9:30 AM - 7:00 PM , Fri 9:00 AM - 5:00 PM  Fees: Free   Phone: (612) 694-8735 Email: emory@[employer] Website: http://www.[employer]     12  [facility] Distance: 4.17 miles      In-Person   [address] [place] [address]  Language: English  Hours: Mon - Fri 8:00 AM - 3:00 PM  Fees: Free   Phone: (895) 948-7166 Ext.14 Email: neighborhood@[facility] Website: http://www.[facility].ReviewPro     Transportation to medical appointments  13  [facility] - Non-Emergency Medical Transportation Distance: 3.52 miles      In-Person   [address] [place] [address]  Language: English  Hours: Mon - Fri 8:00 AM - 4:00 PM Appt. Only  Fees: Self Pay   Phone: (678) 275-8414 Website: http://www.[facility].org/Medical-Services/Emergency-medical-services/Non-emergency-transportation/     14  [facility] Transportation Programs - Non-Emergency Medical Transportation Distance: 5.12 miles      In-Person   [address] [facility] [address] [place] [address]  Language: English, Egyptian, Hungarian  Hours: Mon - Fri 7:00 AM - 6:00 PM  Fees: Insurance   Phone: (105) 270-4145 Ext.4414 Email: osei@[employer].net Website: http://www.[facility] Center[facility] Gluten Free Gourmet.net/[place]/          Important Numbers & Websites       Emergency Services   911  City Services   311  Poison Control   (787) 171-7375  Suicide Prevention Lifeline   (252) 191-1173 (TALK)  Child Abuse Hotline   (104) 181-4535 (4-A-Child)  Sexual Assault Hotline   (943) 838-5993 (HOPE)  [facility]  Safeline   (641) 470-7150 (RUNAWAY)  All-Options Talkline   (745) 921-9888  Substance Abuse Referral   (408) 191-6845 (HELP)

## 2024-01-09 NOTE — LETTER
[facility]  01/09/24  Patient: Ileana Thorne  YOB: 1995  Medical Record Number: [id number]                                                                                  Non-Opioid Controlled Substance Agreement    This is an agreement between you and your provider regarding safe and appropriate use of controlled substances prescribed by your care team. Controlled substances are?medicines that can cause physical and mental dependence (abuse).     There are strict laws about having and using these medicines. We here at [facility] are  committed to working with you in your efforts to get better. To support you in this work, we'll help you schedule regular office appointments for medicine refills. If we must cancel or change your appointment for any reason, we'll make sure you have enough medicine to last until your next appointment.     As a Provider, I will:   Listen carefully to your concerns while treating you with respect.   Recommend a treatment plan that I believe is in your best interest and may involve therapies other than medicine.    Talk with you often about the possible benefits and the risk of harm of any medicine that we prescribe for you.  Assess the safety of this medicine and check how well it works.    Provide a plan on how to taper (discontinue or go off) using this medicine if the decision is made to stop its use.      ::  As a Patient, I understand controlled substances:     Are prescribed by my care provider to help me function or work and manage my condition(s).?  Are strong medicines and can cause serious side effects.     Need to be taken exactly as prescribed.?Combining controlled substances with certain medicines or chemicals (such as illegal drugs, alcohol, sedatives, sleeping pills, and benzodiazepines) can be dangerous or even fatal.? If I stop taking my medicines suddenly, I may have severe withdrawal symptoms.     The risks, benefits, and  side effects of these medicine(s) were explained to me. I agree that:    I will take part in other treatments as advised by my care team. This may be psychiatry or counseling, physical therapy, behavioral therapy, group treatment or a referral to specialist.    I will keep all my appointments and understand this is part of the monitoring of controlled substances.?My care team may require an office visit for EVERY controlled substance refill. If I miss appointments or don t follow instructions, my care team may stop my medicine    I will take my medicines as prescribed. I will not change the dose or schedule unless my care team tells me to. There will be no refills if I run out early.      I may be asked to come to the clinic and complete a urine drug test or complete a pill count. If I don t give a urine sample or participate in a pill count, the care team may stop my medicine.    I will only receive controlled substance prescriptions from this clinic. If I am treated by another provider, I will tell them that I am taking controlled substances and that I have a treatment agreement with this provider. I will inform my [facility] care team within one business day if I am given a prescription for any controlled substance by another healthcare provider. My [facility] care team can contact other providers and pharmacists about my use of any medicines.    It is up to me to make sure that I don't run out of my medicines on weekends or holidays.?If my care team is willing to refill my prescription without a visit, I must request refills only during office hours. Refills may take up to 3 business days to process. I will use one pharmacy to fill all my controlled substance prescriptions. I will notify the clinic about any changes to my insurance or medicine availability.    I am responsible for my prescriptions. If the medicine/prescription is lost, stolen or destroyed, it will not be replaced.?I also agree not  to share controlled substance medicines with anyone.     I am aware I should not use any illegal or recreational drugs. I agree not to drink alcohol unless my care team says I can.     If I enroll in the [place] Medical Cannabis program, I will tell my care team before my next refill.    I will tell my care team right away if I become pregnant, have a new medical problem treated outside of my regular clinic, or have a change in my medicines.     I understand that this medicine can affect my thinking, judgment and reaction time.? Alcohol and drugs affect the brain and body, which can affect the safety of my driving. Being under the influence of alcohol or drugs can affect my decision-making, behaviors, personal safety and the safety of others. Driving while impaired (DWI) can occur if a person is driving, operating or in physical control of a car, motorcycle, boat, snowmobile, ATV, motorbike, off-road vehicle or any other motor vehicle ([place] Statute 169A.20). I understand the risk if I choose to drive or operate any vehicle or machinery.    I understand that if I do not follow any of the conditions above, my prescriptions or treatment may be stopped or changed.   I agree that my provider, clinic care team and pharmacy may work with any city, state or federal law enforcement agency that investigates the misuse, sale or other diversion of my controlled medicine. I will allow my provider to discuss my care with, or share a copy of, this agreement with any other treating provider, pharmacy or emergency room where I receive care.     I have read this agreement and have asked questions about anything I did not understand.    ________________________________________________________  Patient Signature - Ileana Thorne     ___________________                   Date     ________________________________________________________  Provider Signature - LACY Blas CNP       ___________________                   Date      ________________________________________________________  Witness Signature (required if provider not present while patient signing)          ___________________                   Date

## 2024-01-11 PROBLEM — F43.10 PTSD (POST-TRAUMATIC STRESS DISORDER): Status: ACTIVE | Noted: 2023-05-10

## 2024-01-15 ENCOUNTER — PATIENT OUTREACH (OUTPATIENT)
Dept: CARE COORDINATION | Facility: CLINIC | Age: 29
End: 2024-01-15

## 2024-01-15 NOTE — PROGRESS NOTES
Clinic Care Coordination Contact  Care Team Conversations    [facility] spoke with pt at the request of the OBGYN clinic RN. SW collaborated with RN Rosita Silva and reviewed pt chart.    Situation: OBGYN provider feels pt should be seen today to assess symptoms pt is reporting  post-. Pt indicated that it is too cold to come in and does not have a ride.     Background: Pt has are [facility] insurance which does provide transportation, they do allow 2 rides/month that are last minute/same day.     Assessment: [facility] spoke with pt via phone and relayed the above and provided the number to schedule the ride. [phone]. Pt expressed some questioning of whether this will work and did indicate that she does not want to attend the appointment today. Writer relayed that it is important to try the transportation option as it is covered by insurance, unless she has friends or family to bring her, which she states she does not. Writer also encouraged her to come to this appointment because the provider feels it is important for her health and they do not typically request a same day appointment unless there is a medical reason. She agreed to come to the appointment, verbalized understanding that she should arrive at 2:30 to the [facility].     Recommendation: Pt will call to schedule transportation with her insurance, she will request to arrive by 2:30. She will update clinic if she is unable to do this.     Anuja Welch, [facility] Ambulatory Care Management  [facility], [facility]  Phone: [phone]  E-mail: [email or web address]

## 2024-01-16 ENCOUNTER — OFFICE VISIT (OUTPATIENT)
Dept: OBGYN | Facility: CLINIC | Age: 29
End: 2024-01-16
Payer: COMMERCIAL

## 2024-01-16 ENCOUNTER — HOSPITAL ENCOUNTER (OUTPATIENT)
Dept: ULTRASOUND IMAGING | Facility: CLINIC | Age: 29
Discharge: HOME | End: 2024-01-16
Attending: OBSTETRICS & GYNECOLOGY
Payer: COMMERCIAL

## 2024-01-16 ENCOUNTER — VIRTUAL VISIT (OUTPATIENT)
Dept: MIDWIFE SERVICES | Facility: CLINIC | Age: 29
End: 2024-01-16
Payer: COMMERCIAL

## 2024-01-16 ENCOUNTER — TELEPHONE (OUTPATIENT)
Dept: NURSING | Facility: CLINIC | Age: 29
End: 2024-01-16

## 2024-01-16 VITALS
BODY MASS INDEX: 36.94 KG/M2 | OXYGEN SATURATION: 100 % | WEIGHT: 222 LBS | DIASTOLIC BLOOD PRESSURE: 72 MMHG | HEART RATE: 114 BPM | SYSTOLIC BLOOD PRESSURE: 110 MMHG

## 2024-01-16 DIAGNOSIS — N93.9 VAGINAL BLEEDING: ICD-10-CM

## 2024-01-16 DIAGNOSIS — N89.8 VAGINAL DISCHARGE: Primary | ICD-10-CM

## 2024-01-16 DIAGNOSIS — E66.812 CLASS 2 OBESITY WITHOUT SERIOUS COMORBIDITY WITH BODY MASS INDEX (BMI) OF 36.0 TO 36.9 IN ADULT, UNSPECIFIED OBESITY TYPE: ICD-10-CM

## 2024-01-16 DIAGNOSIS — N93.9 VAGINAL BLEEDING: Primary | ICD-10-CM

## 2024-01-16 DIAGNOSIS — A59.01 TRICHOMONAS VAGINITIS: ICD-10-CM

## 2024-01-16 PROBLEM — E66.01 CLASS 2 SEVERE OBESITY DUE TO EXCESS CALORIES WITH SERIOUS COMORBIDITY IN ADULT (H): Status: ACTIVE | Noted: 2024-01-16

## 2024-01-16 LAB
BASOPHILS # BLD AUTO: 0 10E3/UL (ref 0–0.2)
BASOPHILS NFR BLD AUTO: 1 %
CLUE CELLS: ABNORMAL
EOSINOPHIL # BLD AUTO: 0.2 10E3/UL (ref 0–0.7)
EOSINOPHIL NFR BLD AUTO: 2 %
ERYTHROCYTE [DISTWIDTH] IN BLOOD BY AUTOMATED COUNT: 12.1 % (ref 10–15)
HCT VFR BLD AUTO: 47.3 % (ref 35–47)
HGB BLD-MCNC: 16 G/DL (ref 11.7–15.7)
IMM GRANULOCYTES # BLD: 0 10E3/UL
IMM GRANULOCYTES NFR BLD: 0 %
LYMPHOCYTES # BLD AUTO: 2.2 10E3/UL (ref 0.8–5.3)
LYMPHOCYTES NFR BLD AUTO: 32 %
MCH RBC QN AUTO: 30.7 PG (ref 26.5–33)
MCHC RBC AUTO-ENTMCNC: 33.8 G/DL (ref 31.5–36.5)
MCV RBC AUTO: 91 FL (ref 78–100)
MONOCYTES # BLD AUTO: 0.3 10E3/UL (ref 0–1.3)
MONOCYTES NFR BLD AUTO: 4 %
NEUTROPHILS # BLD AUTO: 4.2 10E3/UL (ref 1.6–8.3)
NEUTROPHILS NFR BLD AUTO: 61 %
PLATELET # BLD AUTO: 184 10E3/UL (ref 150–450)
RBC # BLD AUTO: 5.22 10E6/UL (ref 3.8–5.2)
TRICHOMONAS, WET PREP: PRESENT
WBC # BLD AUTO: 6.8 10E3/UL (ref 4–11)
WBC'S/HIGH POWER FIELD, WET PREP: ABNORMAL
YEAST, WET PREP: ABNORMAL

## 2024-01-16 PROCEDURE — 87491 CHLMYD TRACH DNA AMP PROBE: CPT | Performed by: OBSTETRICS & GYNECOLOGY

## 2024-01-16 PROCEDURE — 36415 COLL VENOUS BLD VENIPUNCTURE: CPT | Performed by: OBSTETRICS & GYNECOLOGY

## 2024-01-16 PROCEDURE — 76830 TRANSVAGINAL US NON-OB: CPT | Mod: 26 | Performed by: RADIOLOGY

## 2024-01-16 PROCEDURE — 99207 PR NO CHARGE LOS: CPT | Mod: 95 | Performed by: ADVANCED PRACTICE MIDWIFE

## 2024-01-16 PROCEDURE — 99214 OFFICE O/P EST MOD 30 MIN: CPT | Performed by: OBSTETRICS & GYNECOLOGY

## 2024-01-16 PROCEDURE — 76830 TRANSVAGINAL US NON-OB: CPT

## 2024-01-16 PROCEDURE — 87210 SMEAR WET MOUNT SALINE/INK: CPT | Performed by: OBSTETRICS & GYNECOLOGY

## 2024-01-16 PROCEDURE — 85025 COMPLETE CBC W/AUTO DIFF WBC: CPT | Performed by: OBSTETRICS & GYNECOLOGY

## 2024-01-16 PROCEDURE — 76856 US EXAM PELVIC COMPLETE: CPT | Mod: 26 | Performed by: RADIOLOGY

## 2024-01-16 PROCEDURE — 76856 US EXAM PELVIC COMPLETE: CPT

## 2024-01-16 PROCEDURE — 87591 N.GONORRHOEAE DNA AMP PROB: CPT | Performed by: OBSTETRICS & GYNECOLOGY

## 2024-01-16 RX ORDER — METRONIDAZOLE 500 MG/1
500 TABLET ORAL 2 TIMES DAILY
Qty: 14 TABLET | Refills: 0 | Status: SHIPPED | OUTPATIENT
Start: 2024-01-16 | End: 2024-04-25

## 2024-01-16 NOTE — PROGRESS NOTES
Video platform not working. Telephone call  to patient. Virtual visit not appropriate today.     Vanna Thorne had a Suction D&C on 12/11/23 at 6w6d. Since then she has had continued mild vaginal bleeding. Over the past 72 hours her discharge has changed to more moderate amounts of yellow green bloody discharge and she feels pelvic discomfort. She called yesterday and a clinic visit with CBC/ pelvc ultrasound / pelvic exam was recommended and scheduled however Vanna did not have transportation to get to this appointment. She scheduled a virtual visit for today instead. Recommend that she be seen in person today and social work helped with medical transportation. Triage RNs will facilitate appointments today.     Corinne Sommer, JADYN, APRN CNM'

## 2024-01-16 NOTE — TELEPHONE ENCOUNTER
Per Corinne SALAMANCA - pt has s/s of infection after D&C on 1/16/24.  Due to pt having transportation issues she was unable to make her appointments 1/15/24.  This writer was able to get pt scheduled for an US with a provider follow up with the on-call provider.

## 2024-01-16 NOTE — Clinical Note
Please contact patient and see if she has questions about trich. Offer EPT for partners (2g metronidazole for men).

## 2024-01-16 NOTE — PROGRESS NOTES
"  Assessment & Plan     Vaginal discharge    - NEISSERIA GONORRHOEA PCR  - CHLAMYDIA TRACHOMATIS PCR  - Wet preparation    Vaginal bleeding  Ultrasound reviewed   No evidence of retained products of conception  CBC reviewed, no sign of infection. Hgb normal.   - CBC with platelets and differential    Trichomonas vaginitis  Will contact her and offer EPT for partners.   - metroNIDAZOLE (FLAGYL) 500 MG tablet; Take 1 tablet (500 mg) by mouth 2 times daily        Review of the result(s) of each unique test - wet prep, CBC, ultrasound.   Ordering of each unique test  Prescription drug management         BMI:   Estimated body mass index is 36.94 kg/m  as calculated from the following:    Height as of 12/19/23: 1.651 m (5' 5\").    Weight as of this encounter: 100.7 kg (222 lb).           No follow-ups on file.    Linnea Garza MD  [facility]    Paola Prabhakar is a 28 year old, presenting for the following health issues:  Follow Up      HPI   Presents for evaluation of vaginal discharge.  Had suction D&C for pregnancy termination on 12/11/2023.  Over the last week she has developed increase in gray to green vaginal discharge.  She notes an odor with the discharge.  She has had bacterial vaginosis frequently in the past and it seems a little bit different.  She has not had a fever.  She has not had sexual activity since her procedure.    Past Medical History:   Diagnosis Date    Anxiety     Arthritis     Depressive disorder     Essential hypertension 02/25/2015    PTSD (post-traumatic stress disorder)     Pulmonary embolism (H) 2021    birth control pills and tobacco; s/p six months anticoagulation    Substance abuse (H) 02/13/2014    Uncomplicated asthma        Past Surgical History:   Procedure Laterality Date    APPENDECTOMY      open    DILATION AND CURETTAGE SUCTION N/A 12/11/2023    Procedure: DILATION AND CURETTAGE, UTERUS, USING SUCTION;  Surgeon: Deepti Gonzalez MD;  " Location: [facility] OR    IMPLANT HORMONE  12/11/2023    Procedure: Implant hormone, Nexplanon;  Surgeon: Deepti Gonzalez MD;  Location: [facility] OR    IP DILATION AND CURETTAGE PROCEDURE      TONSILLECTOMY & ADENOIDECTOMY         No family history on file.    Social History     Socioeconomic History    Marital status: Single     Spouse name: Not on file    Number of children: Not on file    Years of education: Not on file    Highest education level: Not on file   Occupational History    Not on file   Tobacco Use    Smoking status: Some Days     Types: Cigarettes, Vaping Device    Smokeless tobacco: Never   Vaping Use    Vaping Use: Every day    Substances: Nicotine, Flavoring    Devices: Refillable tank   Substance and Sexual Activity    Alcohol use: No    Drug use: Not Currently     Types: Fentanyl     Comment: heroin last used about 9/13/16, started taking Methadone at that time, last took Methadone this morning (3/20)    Sexual activity: Never     Partners: Male     Birth control/protection: None   Other Topics Concern    Not on file   Social History Narrative    ** Merged History Encounter **         In an outpatient drug treatment program during the day (started 1/2017)     Social Determinants of Health     Financial Resource Strain: High Risk (1/9/2024)    Financial Resource Strain     Within the past 12 months, have you or your family members you live with been unable to get utilities (heat, electricity) when it was really needed?: Yes   Food Insecurity: High Risk (1/9/2024)    Food Insecurity     Within the past 12 months, did you worry that your food would run out before you got money to buy more?: Yes     Within the past 12 months, did the food you bought just not last and you didn t have money to get more?: Yes   Transportation Needs: High Risk (1/9/2024)    Transportation Needs     Within the past 12 months, has lack of transportation kept you from medical appointments, getting your medicines, non-medical  meetings or appointments, work, or from getting things that you need?: Yes   Physical Activity: Not on file   Stress: Not on file   Social Connections: Not on file   Interpersonal Safety: Low Risk  (10/4/2023)    Interpersonal Safety     Do you feel physically and emotionally safe where you currently live?: Yes     Within the past 12 months, have you been hit, slapped, kicked or otherwise physically hurt by someone?: No     Within the past 12 months, have you been humiliated or emotionally abused in other ways by your partner or ex-partner?: No   Housing Stability: High Risk (1/9/2024)    Housing Stability     Do you have housing? : Yes     Are you worried about losing your housing?: Yes       Current Outpatient Medications   Medication    acetaminophen (TYLENOL) 325 MG tablet    ARIPiprazole (ABILIFY) 10 MG tablet    Biotin w/ Vitamins C & E 1250-7.5-7.5 MCG-MG-UNT CHEW    clonazePAM (KLONOPIN) 2 MG tablet    cyclobenzaprine (FLEXERIL) 10 MG tablet    desipramine (NORPRAMIN) 150 MG tablet    Melatonin 10 MG TABS tablet    ondansetron (ZOFRAN ODT) 4 MG ODT tab    polyethylene glycol (MIRALAX) 17 GM/Dose powder    prazosin (MINIPRESS) 2 MG capsule    pregabalin (LYRICA) 100 MG capsule    senna-docusate (SENOKOT-S/PERICOLACE) 8.6-50 MG tablet    tirzepatide-Weight Management (ZEPBOUND) 2.5 MG/0.5ML prefilled pen    levonorgestrel (PLAN B) 1.5 MG tablet     No current facility-administered medications for this visit.          Allergies   Allergen Reactions    Seasonal Allergies Other (See Comments)           Review of Systems   Constitutional, HEENT, cardiovascular, pulmonary, gi and gu systems are negative, except as otherwise noted.      Objective    /72   Pulse 114   Wt 100.7 kg (222 lb)   LMP  (LMP Unknown)   SpO2 100%   BMI 36.94 kg/m    Body mass index is 36.94 kg/m .  Physical Exam   GENERAL: healthy, alert and no distress  ABDOMEN: soft, nontender, no hepatosplenomegaly, no masses and bowel sounds  normal   (female): normal female external genitalia, normal urethral meatus, vaginal mucosa, normal cervix/adnexa/uterus without masses or discharge  MS: no gross musculoskeletal defects noted, no edema  PSYCH: mentation appears normal, affect normal/bright    Results for orders placed or performed in visit on 01/16/24 (from the past 24 hour(s))   Wet preparation    Specimen: Vagina; Swab   Result Value Ref Range    Trichomonas Present (A) Absent    Yeast Absent Absent    Clue Cells Absent Absent    WBCs/high power field 2+ (A) None   CBC with platelets and differential    Narrative    The following orders were created for panel order CBC with platelets and differential.  Procedure                               Abnormality         Status                     ---------                               -----------         ------                     CBC with platelets and d...[[id number]]  Abnormal            Final result                 Please view results for these tests on the individual orders.   CBC with platelets and differential   Result Value Ref Range    WBC Count 6.8 4.0 - 11.0 10e3/uL    RBC Count 5.22 (H) 3.80 - 5.20 10e6/uL    Hemoglobin 16.0 (H) 11.7 - 15.7 g/dL    Hematocrit 47.3 (H) 35.0 - 47.0 %    MCV 91 78 - 100 fL    MCH 30.7 26.5 - 33.0 pg    MCHC 33.8 31.5 - 36.5 g/dL    RDW 12.1 10.0 - 15.0 %    Platelet Count 184 150 - 450 10e3/uL    % Neutrophils 61 %    % Lymphocytes 32 %    % Monocytes 4 %    % Eosinophils 2 %    % Basophils 1 %    % Immature Granulocytes 0 %    Absolute Neutrophils 4.2 1.6 - 8.3 10e3/uL    Absolute Lymphocytes 2.2 0.8 - 5.3 10e3/uL    Absolute Monocytes 0.3 0.0 - 1.3 10e3/uL    Absolute Eosinophils 0.2 0.0 - 0.7 10e3/uL    Absolute Basophils 0.0 0.0 - 0.2 10e3/uL    Absolute Immature Granulocytes 0.0 <=0.4 10e3/uL             EXAMINATION: US PELVIC TRANSABDOMINAL AND TRANSVAGINAL 1/16/2024 1:47  PM       CLINICAL HISTORY: Vaginal bleeding     COMPARISON: None     PROCEDURE  COMMENT: Both transabdominal and transvaginal imaging  performed of the pelvis with color Doppler.     FINDINGS:  The uterus demonstrates normal homogeneous echogenicity, measuring 4.1  cm x 8.6 cm x 4.8 cm. No myometrial masses are seen. The endometrial  stripe is normal in thickness measuring 3 mm.     Both ovaries are visualized and demonstrate normal follicular  appearance. The right ovary measures 1.6 cm x 2.4 cm x 1.6 cm. The  left ovary measures 1.7 cm x 2.4 cm x 2.3 cm. Small 1 cm  paraovarian/paratubal cyst.   There is no simple free fluid in the pelvis.                                                                      IMPRESSION:  Normal endometrium.     JENN STANTON MD

## 2024-01-17 ENCOUNTER — TELEPHONE (OUTPATIENT)
Dept: OBGYN | Facility: CLINIC | Age: 29
End: 2024-01-17
Payer: COMMERCIAL

## 2024-01-17 DIAGNOSIS — E66.812 CLASS 2 OBESITY WITHOUT SERIOUS COMORBIDITY WITH BODY MASS INDEX (BMI) OF 36.0 TO 36.9 IN ADULT, UNSPECIFIED OBESITY TYPE: Primary | ICD-10-CM

## 2024-01-17 LAB
C TRACH DNA SPEC QL NAA+PROBE: NEGATIVE
N GONORRHOEA DNA SPEC QL NAA+PROBE: NEGATIVE

## 2024-01-17 NOTE — TELEPHONE ENCOUNTER
[employer] message sent to see if she has any follow up questions.    ADELFO Lazar, Linnea Smith MD  [facility] Triage  Please contact patient and see if she has questions about trich. Offer EPT for partners (2g metronidazole for men).

## 2024-01-19 RX ORDER — TIRZEPATIDE 2.5 MG/.5ML
INJECTION, SOLUTION SUBCUTANEOUS
Qty: 2 ML | Refills: 0 | OUTPATIENT
Start: 2024-01-19

## 2024-01-19 NOTE — TELEPHONE ENCOUNTER
Zepbound 2.5MG/0.5ML SOAJ   Vanna Thorne   to [facility] [place] (supporting Jaciel Gamez MD)         1/18/24 10:17 AM  Ok great thank you  Kareen Haines   to Vanna Thorne         1/18/24  7:52 AM  Dr. Grissom sent in a refill of the 5mg dose.     Last read by Vanna Thorne at  4:59 PM on 1/18/2024.  Kareen Haines   to Jaciel Gamez MD       1/18/24  7:52 AM  She sees Sam, the other MTM, in Feb.  January 17, 2024  Jaciel Gamez MD   to Kareen Haines         1/17/24  4:38 PM  Will increase dose to 5.0 mg weekly. Ordered  Jaciel Gamez MD   to Kareen Haines       1/17/24  4:38 PM  I will increase to 5.0 mg weekly. Has she have f/up with Archana?    Jaciel           1/17/24  3:00 PM  Kareen Haines routed this conversation to Jaciel Gamez MD

## 2024-02-14 ENCOUNTER — MYC REFILL (OUTPATIENT)
Dept: ENDOCRINOLOGY | Facility: CLINIC | Age: 29
End: 2024-02-14
Payer: MEDICAID

## 2024-02-14 ENCOUNTER — MYC REFILL (OUTPATIENT)
Dept: FAMILY MEDICINE | Facility: CLINIC | Age: 29
End: 2024-02-14
Payer: MEDICAID

## 2024-02-14 DIAGNOSIS — E66.812 CLASS 2 OBESITY WITHOUT SERIOUS COMORBIDITY WITH BODY MASS INDEX (BMI) OF 36.0 TO 36.9 IN ADULT, UNSPECIFIED OBESITY TYPE: ICD-10-CM

## 2024-02-14 DIAGNOSIS — M54.2 CERVICALGIA: ICD-10-CM

## 2024-02-14 DIAGNOSIS — G89.29 OTHER CHRONIC PAIN: ICD-10-CM

## 2024-02-14 RX ORDER — PREGABALIN 100 MG/1
100 CAPSULE ORAL 3 TIMES DAILY
Qty: 180 CAPSULE | Refills: 1 | Status: SHIPPED | OUTPATIENT
Start: 2024-02-14 | End: 2024-04-29

## 2024-02-14 RX ORDER — CYCLOBENZAPRINE HCL 10 MG
10 TABLET ORAL EVERY 8 HOURS PRN
Qty: 120 TABLET | Refills: 1 | Status: SHIPPED | OUTPATIENT
Start: 2024-02-14 | End: 2024-03-27

## 2024-02-16 NOTE — CONFIDENTIAL NOTE
Appointment with Dr. Grissom in May. Patient is tolerating this dose with some constipation. Routed to MD for sign off.

## 2024-02-20 RX ORDER — TIRZEPATIDE 5 MG/.5ML
INJECTION, SOLUTION SUBCUTANEOUS
Qty: 2 ML | Refills: 0 | OUTPATIENT
Start: 2024-02-20

## 2024-02-23 ENCOUNTER — MYC MEDICAL ADVICE (OUTPATIENT)
Dept: FAMILY MEDICINE | Facility: CLINIC | Age: 29
End: 2024-02-23
Payer: MEDICAID

## 2024-02-26 ENCOUNTER — MYC REFILL (OUTPATIENT)
Dept: FAMILY MEDICINE | Facility: CLINIC | Age: 29
End: 2024-02-26
Payer: MEDICAID

## 2024-02-26 DIAGNOSIS — F51.04 CHRONIC INSOMNIA: ICD-10-CM

## 2024-02-26 DIAGNOSIS — F41.8 MIXED ANXIETY DEPRESSIVE DISORDER: ICD-10-CM

## 2024-02-26 DIAGNOSIS — F51.4 NIGHT TERRORS: Primary | ICD-10-CM

## 2024-02-27 ENCOUNTER — OFFICE VISIT (OUTPATIENT)
Dept: FAMILY MEDICINE | Facility: CLINIC | Age: 29
End: 2024-02-27
Payer: MEDICAID

## 2024-02-27 ENCOUNTER — MYC REFILL (OUTPATIENT)
Dept: FAMILY MEDICINE | Facility: CLINIC | Age: 29
End: 2024-02-27

## 2024-02-27 VITALS
BODY MASS INDEX: 36.74 KG/M2 | SYSTOLIC BLOOD PRESSURE: 113 MMHG | RESPIRATION RATE: 16 BRPM | DIASTOLIC BLOOD PRESSURE: 70 MMHG | WEIGHT: 220.5 LBS | HEART RATE: 88 BPM | TEMPERATURE: 97.7 F | HEIGHT: 65 IN | OXYGEN SATURATION: 95 %

## 2024-02-27 DIAGNOSIS — N89.8 VAGINAL DISCHARGE: Primary | ICD-10-CM

## 2024-02-27 DIAGNOSIS — N30.00 ACUTE CYSTITIS WITHOUT HEMATURIA: Primary | ICD-10-CM

## 2024-02-27 DIAGNOSIS — Z98.890 STATUS POST DILATION AND CURETTAGE: ICD-10-CM

## 2024-02-27 DIAGNOSIS — F41.8 MIXED ANXIETY DEPRESSIVE DISORDER: ICD-10-CM

## 2024-02-27 DIAGNOSIS — R30.0 DYSURIA: ICD-10-CM

## 2024-02-27 LAB
ALBUMIN UR-MCNC: NEGATIVE MG/DL
APPEARANCE UR: ABNORMAL
BACTERIA #/AREA URNS HPF: ABNORMAL /HPF
BILIRUB UR QL STRIP: NEGATIVE
CLUE CELLS: ABNORMAL
COLOR UR AUTO: YELLOW
GLUCOSE UR STRIP-MCNC: NEGATIVE MG/DL
HGB UR QL STRIP: ABNORMAL
KETONES UR STRIP-MCNC: NEGATIVE MG/DL
LEUKOCYTE ESTERASE UR QL STRIP: ABNORMAL
NITRATE UR QL: POSITIVE
PH UR STRIP: 6 [PH] (ref 5–8)
RBC #/AREA URNS AUTO: ABNORMAL /HPF
SP GR UR STRIP: 1.02 (ref 1–1.03)
SQUAMOUS #/AREA URNS AUTO: ABNORMAL /LPF
TRICHOMONAS, WET PREP: ABNORMAL
UROBILINOGEN UR STRIP-ACNC: 0.2 E.U./DL
WBC #/AREA URNS AUTO: ABNORMAL /HPF
WBC'S/HIGH POWER FIELD, WET PREP: ABNORMAL
YEAST, WET PREP: ABNORMAL

## 2024-02-27 PROCEDURE — 87186 SC STD MICRODIL/AGAR DIL: CPT

## 2024-02-27 PROCEDURE — 87086 URINE CULTURE/COLONY COUNT: CPT

## 2024-02-27 PROCEDURE — 81001 URINALYSIS AUTO W/SCOPE: CPT

## 2024-02-27 PROCEDURE — 87491 CHLMYD TRACH DNA AMP PROBE: CPT

## 2024-02-27 PROCEDURE — 87210 SMEAR WET MOUNT SALINE/INK: CPT

## 2024-02-27 PROCEDURE — 87591 N.GONORRHOEAE DNA AMP PROB: CPT

## 2024-02-27 PROCEDURE — 99213 OFFICE O/P EST LOW 20 MIN: CPT

## 2024-02-27 RX ORDER — DESIPRAMINE HYDROCHLORIDE 150 MG/1
150 TABLET ORAL AT BEDTIME
Qty: 60 TABLET | Refills: 0 | Status: SHIPPED | OUTPATIENT
Start: 2024-02-27 | End: 2024-03-15

## 2024-02-27 RX ORDER — CLONAZEPAM 2 MG/1
2 TABLET ORAL 2 TIMES DAILY PRN
Qty: 60 TABLET | Refills: 1 | OUTPATIENT
Start: 2024-02-27

## 2024-02-27 RX ORDER — PRAZOSIN HYDROCHLORIDE 2 MG/1
4 CAPSULE ORAL
Qty: 90 CAPSULE | Refills: 1 | Status: SHIPPED | OUTPATIENT
Start: 2024-02-27 | End: 2024-03-15

## 2024-02-27 RX ORDER — AMOXICILLIN 250 MG
1-2 CAPSULE ORAL 2 TIMES DAILY
Qty: 30 TABLET | Refills: 0 | OUTPATIENT
Start: 2024-02-27

## 2024-02-27 RX ORDER — PHENOL 1.4 %
10-20 AEROSOL, SPRAY (ML) MUCOUS MEMBRANE
Qty: 90 TABLET | Refills: 3 | OUTPATIENT
Start: 2024-02-27

## 2024-02-27 RX ORDER — NITROFURANTOIN 25; 75 MG/1; MG/1
100 CAPSULE ORAL 2 TIMES DAILY
Qty: 10 CAPSULE | Refills: 0 | Status: SHIPPED | OUTPATIENT
Start: 2024-02-27 | End: 2024-03-03

## 2024-02-27 RX ORDER — ARIPIPRAZOLE 10 MG/1
10 TABLET ORAL AT BEDTIME
Qty: 90 TABLET | Refills: 0 | Status: SHIPPED | OUTPATIENT
Start: 2024-02-27 | End: 2024-03-15

## 2024-02-27 ASSESSMENT — PATIENT HEALTH QUESTIONNAIRE - PHQ9
SUM OF ALL RESPONSES TO PHQ QUESTIONS 1-9: 12
SUM OF ALL RESPONSES TO PHQ QUESTIONS 1-9: 12
10. IF YOU CHECKED OFF ANY PROBLEMS, HOW DIFFICULT HAVE THESE PROBLEMS MADE IT FOR YOU TO DO YOUR WORK, TAKE CARE OF THINGS AT HOME, OR GET ALONG WITH OTHER PEOPLE: EXTREMELY DIFFICULT

## 2024-02-27 ASSESSMENT — COLUMBIA-SUICIDE SEVERITY RATING SCALE - C-SSRS
1. WITHIN THE PAST MONTH, HAVE YOU WISHED YOU WERE DEAD OR WISHED YOU COULD GO TO SLEEP AND NOT WAKE UP?: NO
2. IN THE PAST MONTH, HAVE YOU ACTUALLY HAD ANY THOUGHTS OF KILLING YOURSELF?: NO
3. IN THE PAST MONTH, HAVE YOU BEEN THINKING ABOUT HOW YOU MIGHT KILL YOURSELF?: NO
6. HAVE YOU EVER DONE ANYTHING, STARTED TO DO ANYTHING, OR PREPARED TO DO ANYTHING TO END YOUR LIFE?: NO
5. IN THE PAST MONTH, HAVE YOU STARTED TO WORK OUT OR WORKED OUT THE DETAILS OF HOW TO KILL YOURSELF? DO YOU INTEND TO CARRY OUT THIS PLAN?: NO
5. IN THE PAST MONTH, HAVE YOU STARTED TO WORK OUT OR WORKED OUT THE DETAILS OF HOW TO KILL YOURSELF? DO YOU INTEND TO CARRY OUT THIS PLAN?: NO
2. IN THE PAST MONTH, HAVE YOU ACTUALLY HAD ANY THOUGHTS OF KILLING YOURSELF?: YES
6. HAVE YOU EVER DONE ANYTHING, STARTED TO DO ANYTHING, OR PREPARED TO DO ANYTHING TO END YOUR LIFE?: NO
1. WITHIN THE PAST MONTH, HAVE YOU WISHED YOU WERE DEAD OR WISHED YOU COULD GO TO SLEEP AND NOT WAKE UP?: YES

## 2024-02-27 ASSESSMENT — ANXIETY QUESTIONNAIRES
2. NOT BEING ABLE TO STOP OR CONTROL WORRYING: MORE THAN HALF THE DAYS
5. BEING SO RESTLESS THAT IT IS HARD TO SIT STILL: MORE THAN HALF THE DAYS
GAD7 TOTAL SCORE: 14
GAD7 TOTAL SCORE: 14
7. FEELING AFRAID AS IF SOMETHING AWFUL MIGHT HAPPEN: MORE THAN HALF THE DAYS
IF YOU CHECKED OFF ANY PROBLEMS ON THIS QUESTIONNAIRE, HOW DIFFICULT HAVE THESE PROBLEMS MADE IT FOR YOU TO DO YOUR WORK, TAKE CARE OF THINGS AT HOME, OR GET ALONG WITH OTHER PEOPLE: EXTREMELY DIFFICULT
7. FEELING AFRAID AS IF SOMETHING AWFUL MIGHT HAPPEN: MORE THAN HALF THE DAYS
3. WORRYING TOO MUCH ABOUT DIFFERENT THINGS: MORE THAN HALF THE DAYS
GAD7 TOTAL SCORE: 14
4. TROUBLE RELAXING: MORE THAN HALF THE DAYS
1. FEELING NERVOUS, ANXIOUS, OR ON EDGE: MORE THAN HALF THE DAYS
6. BECOMING EASILY ANNOYED OR IRRITABLE: MORE THAN HALF THE DAYS
8. IF YOU CHECKED OFF ANY PROBLEMS, HOW DIFFICULT HAVE THESE MADE IT FOR YOU TO DO YOUR WORK, TAKE CARE OF THINGS AT HOME, OR GET ALONG WITH OTHER PEOPLE?: EXTREMELY DIFFICULT

## 2024-02-27 ASSESSMENT — PAIN SCALES - GENERAL: PAINLEVEL: SEVERE PAIN (6)
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
## 2024-02-27 NOTE — PATIENT INSTRUCTIONS
Will do some test today to help determine the cause of your abnormal vaginal discharge.  We will also do some urine testing to rule out a urinary tract infection as a cause of your symptoms.  As we discussed today in the clinic, if any of these come back positive, I will reach out to you to let you know your results, and send the appropriate medications into your pharmacy.    I would like you to make sure that you follow-up at your upcoming visit with your primary care provider on March 15.  This will be a good time to follow-up regarding any ongoing symptoms or concerns.    Please seek immediate medical attention with symptoms including vaginal pain, pelvic pain, nausea and vomiting, fever, chills, chest pain, shortness of breath, or any suicidal ideation.    Crisis Hotline Numbers for Suicidal Thoughts or Poor Mental Health  Call the Suicide and Crisis Lifeline at 994.  Call 6-120-127-HWDJ ([phone]).  Text HOME to 107636 to access the Crisis Text Line.

## 2024-02-28 LAB
C TRACH DNA SPEC QL PROBE+SIG AMP: NEGATIVE
N GONORRHOEA DNA SPEC QL NAA+PROBE: NEGATIVE

## 2024-02-29 LAB — BACTERIA UR CULT: ABNORMAL

## 2024-03-06 DIAGNOSIS — F41.8 MIXED ANXIETY DEPRESSIVE DISORDER: ICD-10-CM

## 2024-03-07 ENCOUNTER — MYC MEDICAL ADVICE (OUTPATIENT)
Dept: FAMILY MEDICINE | Facility: CLINIC | Age: 29
End: 2024-03-07
Payer: MEDICAID

## 2024-03-08 RX ORDER — CLONAZEPAM 2 MG/1
2 TABLET ORAL 2 TIMES DAILY PRN
Qty: 15 TABLET | Refills: 0 | Status: SHIPPED | OUTPATIENT
Start: 2024-03-08 | End: 2024-03-18

## 2024-03-15 ENCOUNTER — VIRTUAL VISIT (OUTPATIENT)
Dept: FAMILY MEDICINE | Facility: CLINIC | Age: 29
End: 2024-03-15
Payer: MEDICAID

## 2024-03-15 DIAGNOSIS — F41.8 MIXED ANXIETY DEPRESSIVE DISORDER: Primary | ICD-10-CM

## 2024-03-15 DIAGNOSIS — F19.11 HISTORY OF SUBSTANCE ABUSE (H): ICD-10-CM

## 2024-03-15 PROCEDURE — 99213 OFFICE O/P EST LOW 20 MIN: CPT | Mod: 93

## 2024-03-15 RX ORDER — DESIPRAMINE HYDROCHLORIDE 150 MG/1
150 TABLET ORAL AT BEDTIME
Qty: 90 TABLET | Refills: 0 | Status: SHIPPED | OUTPATIENT
Start: 2024-03-15 | End: 2024-03-27

## 2024-03-15 RX ORDER — ARIPIPRAZOLE 10 MG/1
10 TABLET ORAL AT BEDTIME
Qty: 90 TABLET | Refills: 0 | Status: SHIPPED | OUTPATIENT
Start: 2024-03-15 | End: 2024-03-27

## 2024-03-15 ASSESSMENT — ANXIETY QUESTIONNAIRES
GAD7 TOTAL SCORE: 15
8. IF YOU CHECKED OFF ANY PROBLEMS, HOW DIFFICULT HAVE THESE MADE IT FOR YOU TO DO YOUR WORK, TAKE CARE OF THINGS AT HOME, OR GET ALONG WITH OTHER PEOPLE?: SOMEWHAT DIFFICULT
IF YOU CHECKED OFF ANY PROBLEMS ON THIS QUESTIONNAIRE, HOW DIFFICULT HAVE THESE PROBLEMS MADE IT FOR YOU TO DO YOUR WORK, TAKE CARE OF THINGS AT HOME, OR GET ALONG WITH OTHER PEOPLE: SOMEWHAT DIFFICULT
4. TROUBLE RELAXING: MORE THAN HALF THE DAYS
7. FEELING AFRAID AS IF SOMETHING AWFUL MIGHT HAPPEN: MORE THAN HALF THE DAYS
5. BEING SO RESTLESS THAT IT IS HARD TO SIT STILL: MORE THAN HALF THE DAYS
3. WORRYING TOO MUCH ABOUT DIFFERENT THINGS: MORE THAN HALF THE DAYS
GAD7 TOTAL SCORE: 15
1. FEELING NERVOUS, ANXIOUS, OR ON EDGE: MORE THAN HALF THE DAYS
7. FEELING AFRAID AS IF SOMETHING AWFUL MIGHT HAPPEN: MORE THAN HALF THE DAYS
6. BECOMING EASILY ANNOYED OR IRRITABLE: MORE THAN HALF THE DAYS
2. NOT BEING ABLE TO STOP OR CONTROL WORRYING: NEARLY EVERY DAY
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
## 2024-03-18 ENCOUNTER — MYC MEDICAL ADVICE (OUTPATIENT)
Dept: FAMILY MEDICINE | Facility: CLINIC | Age: 29
End: 2024-03-18
Payer: MEDICAID

## 2024-03-18 DIAGNOSIS — F41.8 MIXED ANXIETY DEPRESSIVE DISORDER: ICD-10-CM

## 2024-03-18 DIAGNOSIS — F19.10 POLYSUBSTANCE ABUSE (H): Primary | ICD-10-CM

## 2024-03-19 RX ORDER — CLONAZEPAM 2 MG/1
2 TABLET ORAL 2 TIMES DAILY PRN
Qty: 28 TABLET | Refills: 0 | Status: SHIPPED | OUTPATIENT
Start: 2024-03-19 | End: 2024-04-29

## 2024-03-24 ENCOUNTER — MYC REFILL (OUTPATIENT)
Dept: FAMILY MEDICINE | Facility: CLINIC | Age: 29
End: 2024-03-24
Payer: MEDICAID

## 2024-03-24 ENCOUNTER — MYC REFILL (OUTPATIENT)
Dept: ENDOCRINOLOGY | Facility: CLINIC | Age: 29
End: 2024-03-24
Payer: MEDICAID

## 2024-03-24 DIAGNOSIS — Z30.012 ENCOUNTER FOR EMERGENCY CONTRACEPTION: ICD-10-CM

## 2024-03-24 DIAGNOSIS — E66.812 CLASS 2 OBESITY WITHOUT SERIOUS COMORBIDITY WITH BODY MASS INDEX (BMI) OF 36.0 TO 36.9 IN ADULT, UNSPECIFIED OBESITY TYPE: ICD-10-CM

## 2024-03-25 ENCOUNTER — TELEPHONE (OUTPATIENT)
Dept: ENDOCRINOLOGY | Facility: CLINIC | Age: 29
End: 2024-03-25
Payer: MEDICAID

## 2024-03-25 RX ORDER — LEVONORGESTREL 1.5 MG/1
1.5 TABLET ORAL ONCE
Qty: 1 TABLET | Refills: 1 | OUTPATIENT
Start: 2024-03-25 | End: 2024-03-25

## 2024-03-25 NOTE — TELEPHONE ENCOUNTER
PA Initiation Key: [id number]    Medication: ZEPBOUND 5 MG/0.5ML SC SOAJ  Insurance Company: [place] Medicaid ([facility]) - Phone [phone] Fax [phone]  Pharmacy Filling the Rx: [facility] - [place], [place] - [address] [place] [address]  Filling Pharmacy Phone: [phone]  Filling Pharmacy Fax: [phone]  Start Date: 3/25/2024

## 2024-03-26 ENCOUNTER — MYC MEDICAL ADVICE (OUTPATIENT)
Dept: FAMILY MEDICINE | Facility: CLINIC | Age: 29
End: 2024-03-26
Payer: MEDICAID

## 2024-03-26 DIAGNOSIS — F41.8 MIXED ANXIETY DEPRESSIVE DISORDER: ICD-10-CM

## 2024-03-26 NOTE — TELEPHONE ENCOUNTER
PRIOR AUTHORIZATION DENIED    Medication: ZEPBOUND 5 MG/0.5ML SC SOAJ  Insurance Company: [place] Medicaid ([facility]) - Phone [phone] Fax [phone]  Denial Date: 3/26/2024  Denial Reason(s):      Appeal Information:    Patient Notified: Clinic to discuss next steps

## 2024-03-27 ENCOUNTER — TELEPHONE (OUTPATIENT)
Dept: ENDOCRINOLOGY | Facility: CLINIC | Age: 29
End: 2024-03-27
Payer: MEDICAID

## 2024-03-27 DIAGNOSIS — M54.2 CERVICALGIA: ICD-10-CM

## 2024-03-27 DIAGNOSIS — G89.29 OTHER CHRONIC PAIN: ICD-10-CM

## 2024-03-27 DIAGNOSIS — F41.8 MIXED ANXIETY DEPRESSIVE DISORDER: ICD-10-CM

## 2024-03-27 DIAGNOSIS — E66.812 CLASS 2 OBESITY WITHOUT SERIOUS COMORBIDITY WITH BODY MASS INDEX (BMI) OF 36.0 TO 36.9 IN ADULT, UNSPECIFIED OBESITY TYPE: Primary | ICD-10-CM

## 2024-03-27 RX ORDER — DESIPRAMINE HYDROCHLORIDE 150 MG/1
150 TABLET ORAL AT BEDTIME
Qty: 30 TABLET | Refills: 0 | Status: SHIPPED | OUTPATIENT
Start: 2024-03-27 | End: 2024-04-23

## 2024-03-27 RX ORDER — SEMAGLUTIDE 0.25 MG/.5ML
0.25 INJECTION, SOLUTION SUBCUTANEOUS WEEKLY
Qty: 2 ML | Refills: 0 | Status: SHIPPED | OUTPATIENT
Start: 2024-03-27 | End: 2024-05-30

## 2024-03-27 RX ORDER — DESIPRAMINE HYDROCHLORIDE 150 MG/1
150 TABLET ORAL AT BEDTIME
Qty: 90 TABLET | Refills: 0 | Status: CANCELLED | OUTPATIENT
Start: 2024-03-27

## 2024-03-27 NOTE — TELEPHONE ENCOUNTER
PA Initiation    Medication: WEGOVY 0.25 MG/0.5ML SC SOAJ  Insurance Company: [place] Medicaid ([facility]) - Phone [phone] Fax [phone]  Pharmacy Filling the Rx: [facility] [place] [id number] - [place], [place] - [address] [place] [address], [address]  Filling Pharmacy Phone: [phone]  Filling Pharmacy Fax: [phone]  Start Date: 3/27/2024     Key: [id number]
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
## 2024-03-27 NOTE — TELEPHONE ENCOUNTER
Relayed message to pt.  She said she cannot make it in earlier than her appt which is already made because she works, but she needs her medication.  Would you be able to refill her medication just until her next appt with you?

## 2024-03-28 RX ORDER — ARIPIPRAZOLE 10 MG/1
10 TABLET ORAL AT BEDTIME
Qty: 90 TABLET | Refills: 0 | Status: SHIPPED | OUTPATIENT
Start: 2024-03-28 | End: 2024-06-28

## 2024-03-28 RX ORDER — PREGABALIN 100 MG/1
100 CAPSULE ORAL 3 TIMES DAILY
Qty: 180 CAPSULE | Refills: 1 | OUTPATIENT
Start: 2024-03-28

## 2024-03-28 RX ORDER — CLONAZEPAM 2 MG/1
2 TABLET ORAL 2 TIMES DAILY PRN
Qty: 28 TABLET | Refills: 0 | OUTPATIENT
Start: 2024-03-28

## 2024-03-28 RX ORDER — CYCLOBENZAPRINE HCL 10 MG
10 TABLET ORAL EVERY 8 HOURS PRN
Qty: 30 TABLET | Refills: 0 | Status: SHIPPED | OUTPATIENT
Start: 2024-03-28 | End: 2024-07-05
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
## 2024-03-28 NOTE — TELEPHONE ENCOUNTER
See latest message to patient- the appointment she scheduled for 04/05 needs to be changed to in person and patient was informed as well as offered help with scheduling an earlier appointment if she needs assistance.

## 2024-03-29 ENCOUNTER — TELEPHONE (OUTPATIENT)
Dept: FAMILY MEDICINE | Facility: CLINIC | Age: 29
End: 2024-03-29
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
## 2024-03-29 NOTE — TELEPHONE ENCOUNTER
PRIOR AUTHORIZATION DENIED    Medication: WEGOVY 0.25 MG/0.5ML SC SOAJ  Insurance Company: [place] Medicaid ([facility]) - Phone [phone] Fax [phone]  Denial Date: 3/29/2024  Denial Reason(s): See denial letter   Appeal Information: See denial letter

## 2024-04-01 NOTE — TELEPHONE ENCOUNTER
Sent patient "[employer] message offering RD visit now or wait to see Dr. Grissom in May. Will appeal once those chart notes are documented.

## 2024-04-03 NOTE — TELEPHONE ENCOUNTER
Lm on vm to call back by noon to confirm if pt can make 3:10 arrival appt that is on hold for her.

## 2024-04-04 ENCOUNTER — TELEPHONE (OUTPATIENT)
Dept: FAMILY MEDICINE | Facility: CLINIC | Age: 29
End: 2024-04-04
Payer: COMMERCIAL

## 2024-04-04 NOTE — TELEPHONE ENCOUNTER
Reason for Call:  Appointment Request    Patient requesting this type of appt: Chronic Diease Management/Medication/Follow-Up    Requested provider: Dorota Marquis    Reason patient unable to be scheduled: Not within requested timeframe    When does patient want to be seen/preferred time: 3-7 days    Comments: med check, in person    Could we send this information to you in [facility] or would you prefer to receive a phone call?:   Patient would prefer a phone call   Okay to leave a detailed message?: Yes at Cell number on file:    Telephone Information:   Mobile [phone]       Call taken on 4/4/2024 at 4:54 PM by Yuni Gastelum

## 2024-04-22 DIAGNOSIS — F41.8 MIXED ANXIETY DEPRESSIVE DISORDER: ICD-10-CM

## 2024-04-23 ENCOUNTER — MYC REFILL (OUTPATIENT)
Dept: FAMILY MEDICINE | Facility: CLINIC | Age: 29
End: 2024-04-23

## 2024-04-23 ENCOUNTER — MYC MEDICAL ADVICE (OUTPATIENT)
Dept: FAMILY MEDICINE | Facility: CLINIC | Age: 29
End: 2024-04-23

## 2024-04-23 ENCOUNTER — OFFICE VISIT (OUTPATIENT)
Dept: FAMILY MEDICINE | Facility: CLINIC | Age: 29
End: 2024-04-23
Payer: COMMERCIAL

## 2024-04-23 VITALS
TEMPERATURE: 97.6 F | SYSTOLIC BLOOD PRESSURE: 116 MMHG | OXYGEN SATURATION: 100 % | RESPIRATION RATE: 16 BRPM | HEIGHT: 65 IN | WEIGHT: 217 LBS | HEART RATE: 94 BPM | DIASTOLIC BLOOD PRESSURE: 78 MMHG | BODY MASS INDEX: 36.15 KG/M2

## 2024-04-23 DIAGNOSIS — Z30.9 ENCOUNTER FOR CONTRACEPTIVE MANAGEMENT, UNSPECIFIED TYPE: ICD-10-CM

## 2024-04-23 DIAGNOSIS — F19.10 POLYSUBSTANCE ABUSE (H): ICD-10-CM

## 2024-04-23 DIAGNOSIS — F41.8 MIXED ANXIETY DEPRESSIVE DISORDER: ICD-10-CM

## 2024-04-23 DIAGNOSIS — Z30.46 NEXPLANON REMOVAL: Primary | ICD-10-CM

## 2024-04-23 DIAGNOSIS — Z30.46 ENCOUNTER FOR NEXPLANON REMOVAL: ICD-10-CM

## 2024-04-23 DIAGNOSIS — R30.0 DYSURIA: ICD-10-CM

## 2024-04-23 DIAGNOSIS — R30.0 DYSURIA: Primary | ICD-10-CM

## 2024-04-23 DIAGNOSIS — Z30.012 ENCOUNTER FOR EMERGENCY CONTRACEPTION: ICD-10-CM

## 2024-04-23 DIAGNOSIS — Z86.711 HISTORY OF PULMONARY EMBOLISM: ICD-10-CM

## 2024-04-23 DIAGNOSIS — N39.0 ACUTE UTI (URINARY TRACT INFECTION): Primary | ICD-10-CM

## 2024-04-23 LAB
ALBUMIN UR-MCNC: NEGATIVE MG/DL
AMPHETAMINES UR QL SCN: ABNORMAL
APPEARANCE UR: ABNORMAL
BACTERIA #/AREA URNS HPF: ABNORMAL /HPF
BARBITURATES UR QL SCN: ABNORMAL
BENZODIAZ UR QL SCN: ABNORMAL
BILIRUB UR QL STRIP: NEGATIVE
BZE UR QL SCN: ABNORMAL
CANNABINOIDS UR QL SCN: ABNORMAL
COLOR UR AUTO: YELLOW
FENTANYL UR QL: ABNORMAL
GLUCOSE UR STRIP-MCNC: NEGATIVE MG/DL
HGB UR QL STRIP: NEGATIVE
KETONES UR STRIP-MCNC: NEGATIVE MG/DL
LEUKOCYTE ESTERASE UR QL STRIP: ABNORMAL
NITRATE UR QL: POSITIVE
OPIATES UR QL SCN: ABNORMAL
PCP QUAL URINE (ROCHE): ABNORMAL
PH UR STRIP: 7 [PH] (ref 5–8)
RBC #/AREA URNS AUTO: ABNORMAL /HPF
SP GR UR STRIP: 1.02 (ref 1–1.03)
SQUAMOUS #/AREA URNS AUTO: ABNORMAL /LPF
UROBILINOGEN UR STRIP-ACNC: 0.2 E.U./DL
WBC #/AREA URNS AUTO: ABNORMAL /HPF

## 2024-04-23 PROCEDURE — 11982 REMOVE DRUG IMPLANT DEVICE: CPT | Performed by: PHYSICIAN ASSISTANT

## 2024-04-23 PROCEDURE — 87186 SC STD MICRODIL/AGAR DIL: CPT

## 2024-04-23 PROCEDURE — 81001 URINALYSIS AUTO W/SCOPE: CPT | Mod: 59

## 2024-04-23 PROCEDURE — 80307 DRUG TEST PRSMV CHEM ANLYZR: CPT

## 2024-04-23 PROCEDURE — 99213 OFFICE O/P EST LOW 20 MIN: CPT | Mod: 25 | Performed by: PHYSICIAN ASSISTANT

## 2024-04-23 PROCEDURE — 87086 URINE CULTURE/COLONY COUNT: CPT

## 2024-04-23 RX ORDER — SULFAMETHOXAZOLE/TRIMETHOPRIM 800-160 MG
1 TABLET ORAL 2 TIMES DAILY
Qty: 6 TABLET | Refills: 0 | Status: SHIPPED | OUTPATIENT
Start: 2024-04-23 | End: 2024-04-26

## 2024-04-23 RX ORDER — ACETAMINOPHEN AND CODEINE PHOSPHATE 120; 12 MG/5ML; MG/5ML
0.35 SOLUTION ORAL DAILY
Qty: 90 TABLET | Refills: 1 | Status: SHIPPED | OUTPATIENT
Start: 2024-04-23 | End: 2024-06-14

## 2024-04-23 RX ORDER — LIDOCAINE HYDROCHLORIDE AND EPINEPHRINE 10; 10 MG/ML; UG/ML
2.5 INJECTION, SOLUTION INFILTRATION; PERINEURAL ONCE
Status: COMPLETED | OUTPATIENT
Start: 2024-04-23 | End: 2024-04-23

## 2024-04-23 RX ADMIN — LIDOCAINE HYDROCHLORIDE AND EPINEPHRINE 2.5 ML: 10; 10 INJECTION, SOLUTION INFILTRATION; PERINEURAL at 16:35

## 2024-04-23 ASSESSMENT — PATIENT HEALTH QUESTIONNAIRE - PHQ9: SUM OF ALL RESPONSES TO PHQ QUESTIONS 1-9: 20

## 2024-04-23 NOTE — PROGRESS NOTES
Subjective:    Ileana Thorne is a 28 year old female who presents for Nexplanon removal.  Nexplanon placed 12/11/2023.  She is having side effects from it and would like it removed.  She says she is having swelling in her hands and feet, weight gain, recurrent UTIs.  She is not having her period right now.    She notes she has an appointment with her PCP later today for a physical or follow-up exam.      Patient Active Problem List   Diagnosis    Anxiety    Depression    Essential hypertension    Generalized anxiety disorder    Methamphetamine addiction (H)    Tobacco dependence syndrome    History of pre-eclampsia in prior pregnancy, currently pregnant in second trimester    Substance abuse affecting pregnancy in second trimester, antepartum (H)    Marginal cord insertion    HRP (high risk pregnancy), second trimester    Encounter for triage in pregnant patient    Pregnancy    Normal labor    Mixed anxiety depressive disorder    Insomnia    Pregnancy-induced hypertension    Encounter for routine postpartum follow-up    Tobacco use    Opioid use disorder, severe, dependence (H)    Depression with suicidal ideation    Night terrors    Anxiety    History of pulmonary embolism    Cervicalgia    Other chronic pain    Methamphetamine abuse in remission (H)    PTSD (post-traumatic stress disorder)    Class 2 severe obesity due to excess calories with serious comorbidity in adult (H)       Current Outpatient Medications:     ARIPiprazole (ABILIFY) 10 MG tablet, Take 1 tablet (10 mg) by mouth at bedtime, Disp: 90 tablet, Rfl: 0    clonazePAM (KLONOPIN) 2 MG tablet, Take 1 tablet (2 mg) by mouth 2 times daily as needed for anxiety *Needs lab appointment prior to further refills*, Disp: 28 tablet, Rfl: 0    cyclobenzaprine (FLEXERIL) 10 MG tablet, Take 1 tablet (10 mg) by mouth every 8 hours as needed for muscle spasms (chronic pain), Disp: 30 tablet, Rfl: 0    desipramine (NORPRAMIN) 150 MG tablet, Take 1 tablet (150 mg) by  "mouth at bedtime, Disp: 30 tablet, Rfl: 0    norethindrone (MICRONOR) 0.35 MG tablet, Take 1 tablet (0.35 mg) by mouth daily, Disp: 90 tablet, Rfl: 1    ondansetron (ZOFRAN ODT) 4 MG ODT tab, Take 1 tablet (4 mg) by mouth every 8 hours as needed for nausea, Disp: 4 tablet, Rfl: 0    polyethylene glycol (MIRALAX) 17 GM/Dose powder, Take 17 g (1 Capful) by mouth daily, Disp: 578 g, Rfl: 11    pregabalin (LYRICA) 100 MG capsule, Take 1 capsule (100 mg) by mouth 3 times daily, Disp: 180 capsule, Rfl: 1    Semaglutide-Weight Management (WEGOVY) 0.25 MG/0.5ML pen, Inject 0.25 mg Subcutaneous once a week, Disp: 2 mL, Rfl: 0    Semaglutide-Weight Management (WEGOVY) 0.5 MG/0.5ML pen, Inject 0.5 mg Subcutaneous once a week To be used after finishing 0.25 mg pen, Disp: 2 mL, Rfl: 0    Semaglutide-Weight Management (WEGOVY) 1 MG/0.5ML pen, Inject 1 mg Subcutaneous once a week To be used after finishing 0.5 mg pen, Disp: 2 mL, Rfl: 0    senna-docusate (SENOKOT-S/PERICOLACE) 8.6-50 MG tablet, Take 1-2 tablets by mouth 2 times daily, Disp: 30 tablet, Rfl: 0    levonorgestrel (PLAN B) 1.5 MG tablet, Take 1 tablet (1.5 mg) by mouth once for 1 dose, Disp: 1 tablet, Rfl: 1    metroNIDAZOLE (FLAGYL) 500 MG tablet, Take 1 tablet (500 mg) by mouth 2 times daily, Disp: 14 tablet, Rfl: 0    Current Facility-Administered Medications:     lidocaine 1% with EPINEPHrine 1:100,000 injection 2.5 mL, 2.5 mL, Intradermal, Once,       Objective:   Allergies:  Seasonal allergies    /78 (BP Location: Left arm, Patient Position: Sitting, Cuff Size: Adult Large)   Pulse 94   Temp 97.6  F (36.4  C) (Temporal)   Resp 16   Ht 1.651 m (5' 5\")   Wt 98.4 kg (217 lb)   LMP 02/27/2024   SpO2 100%   BMI 36.11 kg/m    Body mass index is 36.11 kg/m .    General: Alert and oriented x 3, in no apparent distress      Procedure:  Left upper inner arm was adequately anesthetized with 2.5 cc of lidocaine with Epi.  Then, using sterile technique, 5 mm " incision was made with an 11 blade.  Nexplanon was palpated subcutaneously and removed with a hemostat clamp.  Incision site was closed with steri-strips and wrapped with a pressure bandage.  Patient was neurovascularly intact after exam.  Appropriate wound aftercare was dicussed with patient.         Assessment and Plan:     1. Nexplanon removal  Nexplanon removed today.  She is aware it is possible for patient to become pregnant as soon as Nexplanon is removed.  - REMOVAL NEXPLANON  - lidocaine 1% with EPINEPHrine 1:100,000 injection 2.5 mL    2. Encounter for contraceptive management, unspecified type  3. History of pulmonary embolism  She would like to try other birth control today.  She needs non-estrogen birth control.  We reviewed Depo-Provera, IUD.  She does not want to try these.  She is interested in progestin only pills.  We reviewed that these can be less effective than regular (combination) birth control pills.  She understands this.  She would like to take them.  We reviewed using backup birth control for the first 2 weeks.  Discussed improved pregnancy prevention with pills plus condoms all the time.  She will follow-up with her PCP if questions or concerns.  - norethindrone (MICRONOR) 0.35 MG tablet; Take 1 tablet (0.35 mg) by mouth daily  Dispense: 90 tablet; Refill: 1        This dictation uses voice recognition software, which may contain typographical errors.

## 2024-04-24 ENCOUNTER — MYC MEDICAL ADVICE (OUTPATIENT)
Dept: FAMILY MEDICINE | Facility: CLINIC | Age: 29
End: 2024-04-24
Payer: COMMERCIAL

## 2024-04-24 RX ORDER — DESIPRAMINE HYDROCHLORIDE 150 MG/1
150 TABLET ORAL AT BEDTIME
Qty: 30 TABLET | Refills: 1 | Status: SHIPPED | OUTPATIENT
Start: 2024-04-24 | End: 2024-08-21

## 2024-04-24 RX ORDER — CLONAZEPAM 2 MG/1
2 TABLET ORAL 2 TIMES DAILY PRN
Qty: 28 TABLET | Refills: 0 | OUTPATIENT
Start: 2024-04-24

## 2024-04-24 RX ORDER — DESIPRAMINE HYDROCHLORIDE 150 MG/1
150 TABLET ORAL AT BEDTIME
Qty: 30 TABLET | Refills: 0 | OUTPATIENT
Start: 2024-04-24

## 2024-04-24 RX ORDER — LEVONORGESTREL 1.5 MG/1
1.5 TABLET ORAL ONCE
Qty: 1 TABLET | Refills: 0 | Status: SHIPPED | OUTPATIENT
Start: 2024-04-24 | End: 2024-04-29

## 2024-04-25 PROBLEM — Z36.89 ENCOUNTER FOR TRIAGE IN PREGNANT PATIENT: Status: RESOLVED | Noted: 2017-02-18 | Resolved: 2024-04-25

## 2024-04-25 PROBLEM — Z34.90 PREGNANCY: Status: RESOLVED | Noted: 2017-02-18 | Resolved: 2024-04-25

## 2024-04-25 PROBLEM — Z37.9 NORMAL LABOR: Status: RESOLVED | Noted: 2017-02-18 | Resolved: 2024-04-25

## 2024-04-25 LAB — BACTERIA UR CULT: ABNORMAL

## 2024-04-29 ENCOUNTER — OFFICE VISIT (OUTPATIENT)
Dept: BEHAVIORAL HEALTH | Facility: CLINIC | Age: 29
End: 2024-04-29
Payer: COMMERCIAL

## 2024-04-29 VITALS — DIASTOLIC BLOOD PRESSURE: 91 MMHG | HEART RATE: 93 BPM | OXYGEN SATURATION: 99 % | SYSTOLIC BLOOD PRESSURE: 129 MMHG

## 2024-04-29 DIAGNOSIS — Z30.09 ENCOUNTER FOR COUNSELING REGARDING CONTRACEPTION: ICD-10-CM

## 2024-04-29 DIAGNOSIS — F17.200 NICOTINE USE DISORDER: ICD-10-CM

## 2024-04-29 DIAGNOSIS — F11.20 OPIOID USE DISORDER, SEVERE, DEPENDENCE (H): Primary | ICD-10-CM

## 2024-04-29 DIAGNOSIS — F11.93 OPIOID WITHDRAWAL (H): ICD-10-CM

## 2024-04-29 DIAGNOSIS — Z79.899 CHRONIC PRESCRIPTION BENZODIAZEPINE USE: ICD-10-CM

## 2024-04-29 LAB
AMPHETAMINE QUAL URINE POCT: NEGATIVE
BARBITURATE QUAL URINE POCT: NEGATIVE
BENZODIAZEPINE QUAL URINE POCT: ABNORMAL
BUPRENORPHINE QUAL URINE POCT: NEGATIVE
COCAINE QUAL URINE POCT: NEGATIVE
CREATININE QUAL URINE POCT: ABNORMAL
INTERNAL QC QUAL URINE POCT: ABNORMAL
MDMA QUAL URINE POCT: NEGATIVE
METHADONE QUAL URINE POCT: NEGATIVE
METHAMPHETAMINE QUAL URINE POCT: NEGATIVE
OPIATE QUAL URINE POCT: NEGATIVE
OXYCODONE QUAL URINE POCT: NEGATIVE
PH QUAL URINE POCT: ABNORMAL
PHENCYCLIDINE QUAL URINE POCT: NEGATIVE
POCT KIT EXPIRATION DATE: ABNORMAL
POCT KIT LOT NUMBER: ABNORMAL
SPECIFIC GRAVITY POCT: >=1.03
TEMPERATURE URINE POCT: ABNORMAL
THC QUAL URINE POCT: NEGATIVE

## 2024-04-29 PROCEDURE — 99214 OFFICE O/P EST MOD 30 MIN: CPT | Performed by: FAMILY MEDICINE

## 2024-04-29 PROCEDURE — 99207 PR NO BILLABLE SERVICE THIS VISIT: CPT | Performed by: SOCIAL WORKER

## 2024-04-29 PROCEDURE — 80305 DRUG TEST PRSMV DIR OPT OBS: CPT | Performed by: FAMILY MEDICINE

## 2024-04-29 RX ORDER — ONDANSETRON 4 MG/1
4 TABLET, ORALLY DISINTEGRATING ORAL EVERY 8 HOURS PRN
Qty: 12 TABLET | Refills: 0 | Status: SHIPPED | OUTPATIENT
Start: 2024-04-29 | End: 2024-05-07

## 2024-04-29 RX ORDER — TRAZODONE HYDROCHLORIDE 50 MG/1
50 TABLET, FILM COATED ORAL
Qty: 30 TABLET | Refills: 0 | Status: SHIPPED | OUTPATIENT
Start: 2024-04-29 | End: 2024-05-30

## 2024-04-29 RX ORDER — PREGABALIN 100 MG/1
100 CAPSULE ORAL 3 TIMES DAILY
Qty: 90 CAPSULE | Refills: 0 | Status: SHIPPED | OUTPATIENT
Start: 2024-04-29 | End: 2024-06-07

## 2024-04-29 RX ORDER — BUPRENORPHINE AND NALOXONE 8; 2 MG/1; MG/1
FILM, SOLUBLE BUCCAL; SUBLINGUAL
Qty: 30 FILM | Refills: 0 | Status: SHIPPED | OUTPATIENT
Start: 2024-04-29 | End: 2024-05-09

## 2024-04-29 RX ORDER — ONDANSETRON 4 MG/1
4 TABLET, ORALLY DISINTEGRATING ORAL EVERY 8 HOURS PRN
Qty: 12 TABLET | Refills: 0 | Status: SHIPPED | OUTPATIENT
Start: 2024-04-29 | End: 2024-04-29

## 2024-04-29 RX ORDER — CLONIDINE HYDROCHLORIDE 0.1 MG/1
0.1 TABLET ORAL 3 TIMES DAILY PRN
Qty: 9 TABLET | Refills: 0 | Status: SHIPPED | OUTPATIENT
Start: 2024-04-29 | End: 2024-05-09

## 2024-04-29 RX ORDER — PREGABALIN 100 MG/1
100 CAPSULE ORAL 3 TIMES DAILY
Qty: 90 CAPSULE | Refills: 0 | Status: SHIPPED | OUTPATIENT
Start: 2024-04-29 | End: 2024-04-29

## 2024-04-29 RX ORDER — BUPRENORPHINE AND NALOXONE 8; 2 MG/1; MG/1
FILM, SOLUBLE BUCCAL; SUBLINGUAL
Qty: 30 FILM | Refills: 0 | Status: SHIPPED | OUTPATIENT
Start: 2024-04-29 | End: 2024-04-29

## 2024-04-29 RX ORDER — CLONIDINE HYDROCHLORIDE 0.1 MG/1
0.1 TABLET ORAL 3 TIMES DAILY PRN
Qty: 9 TABLET | Refills: 0 | Status: SHIPPED | OUTPATIENT
Start: 2024-04-29 | End: 2024-04-29

## 2024-04-29 ASSESSMENT — COLUMBIA-SUICIDE SEVERITY RATING SCALE - C-SSRS
1. IN THE PAST MONTH, HAVE YOU WISHED YOU WERE DEAD OR WISHED YOU COULD GO TO SLEEP AND NOT WAKE UP?: NO
6. HAVE YOU EVER DONE ANYTHING, STARTED TO DO ANYTHING, OR PREPARED TO DO ANYTHING TO END YOUR LIFE?: YES
2. HAVE YOU ACTUALLY HAD ANY THOUGHTS OF KILLING YOURSELF LIFETIME?: YES
REASONS FOR IDEATION LIFETIME: MOSTLY TO END OR STOP THE PAIN (YOU COULDN'T GO ON LIVING WITH THE PAIN OR HOW YOU WERE FEELING)
2. HAVE YOU ACTUALLY HAD ANY THOUGHTS OF KILLING YOURSELF IN THE PAST MONTH?: NO
1. IN THE PAST MONTH, HAVE YOU WISHED YOU WERE DEAD OR WISHED YOU COULD GO TO SLEEP AND NOT WAKE UP?: YES

## 2024-04-29 ASSESSMENT — PATIENT HEALTH QUESTIONNAIRE - PHQ9: SUM OF ALL RESPONSES TO PHQ QUESTIONS 1-9: 22

## 2024-04-29 NOTE — PROGRESS NOTES
The patient completed the [place] as she scored elevated on the PHQ-9 question 9 and she was given the 988 number to call if she had increasing suicidal thoughts or feeling in emotional crisis to gain support.

## 2024-04-29 NOTE — NURSING NOTE
[facility] presented [facility] to the [facility] stating there was a problem with [facility] filling her rxs. She has had this problem before and [facility] was able to figure it out. Patient requests all rxs from today be resent to [facility] in [place].    Chantal Jean-Baptiste RN on 4/29/2024 at 3:16 PM

## 2024-04-29 NOTE — PROGRESS NOTES
Note made in error. Please disregard.     Shoshana Nelson,   Addiction Medicine Fellow   Pronouns: She/Hers

## 2024-04-29 NOTE — NURSING NOTE
"[facility]      Rooming information:    Approximate last use of full opioid agonist: 4/28/2024 around 9:00 pm   Taking buprenorphine? No. None since last visit     Side effects related to buprenorphine (constipation, dry mouth, sedation?)  NA  Narcan currently available: Yes  Other recent substance use:    fentanyl  NICOTINE-Yes: cigarettes   If using nicotine, ready to quit? No    Point of care urine drug screen positive for:  Lab Results   Component Value Date    BUP Negative 04/29/2024    BZO Screen Positive (A) 04/29/2024    BAR Negative 04/29/2024    JOSEPH Negative 04/29/2024    MAMP Negative 04/29/2024    AMP Negative 04/29/2024    MDMA Negative 04/29/2024    MTD Negative 04/29/2024    ZOK275 Negative 04/29/2024    OXY Negative 04/29/2024    PCP Negative 04/29/2024    THC Negative 04/29/2024    TEMP Invalid (A) 04/29/2024    SGPOCT >=1.030 (A) 04/29/2024       *POC urine drug screen does not screen for Fentanyl    Pregnancy Status   LMP: Just got birth control removed nexoplan- and now on oral BC  Birth control/barriers: Yes  Interested in birth control if none currently? NA        4/29/2024    12:00 PM   PHQ Assesment Total Score(s)   PHQ-9 Score 22       If PHQ-9 score of 15 or higher, has [facility] therapist or provider been notified? Yes    Any current suicidal ideation? No, \"Not right now\"   If yes, has [facility] therapist or provider been notified? N/A    Primary care provider: LACY Blas CNP     Mental health provider: Sahil = psychiatry  (follow up on MH referral if needed)    Housing needs: Yes    Insurance: Active    Current legal issues: None    Contact information up to date? Yes    3rd Party Involvement: None today  (please obtain BARBARA if pt would like to include)    Felicity Diallo RN  April 29, 2024  12:51 PM    "

## 2024-04-29 NOTE — PATIENT INSTRUCTIONS
Buprenorphine Self Start:    1) Take a day off and have a place to rest.  2) Stop using, and wait until you feel very sick from withdrawals  3) Dose one or two 8mg films or tablets under your tongue (first dose 8-16mg)  4) Take another one or two 8mg films or tablets an hour later to feel well (can take these immediately after first dose if your symptoms get worse)  5) The next day, take 2-4 films or tablets (16-32mg) at once.    6) The next day, continue taking 2-3 films or tablets every day to control cravings and withdrawal       Sublocade Scheduling at Infusion Center     Your provider has ordered the injectable medication, Sublocade, as part of your treatment plan. This medication must be approved by your insurance company before it can be administered. That process is called a prior authorization. To start the prior authorization process, you need to schedule a Sublocade appointment at the [facility].     Please call the [facility] at [phone] to schedule your Sublocade injection as soon as possible.     If your insurance does not approve the Sublocade injection before your appointment, you will be contacted to reschedule your appointment at the [facility].     [facility]   [address], [address]nd [place] [address]   [phone]     Other potential sites for Sublocade administration:  [facility] ([place]): [phone], option #3  [facility] ([place]): [phone]   [facility] ([place]): [phone]   [facility]): [phone]     Copays and Deductibles     If you have private or employer-based insurance, your insurance company may approve the Sublocade prior authorization, however may not cover any copay or deductible costs. Please contact your insurance directly regarding copay/deductible costs.     If you need copay/deductible assistance,  contact [employer] at [email or web address].Ideapod or [phone].     Eligibility requirements for copay/deductible assistance for patients with private insurance are as follows:      Private health insurance not funded by a government organization    At least 18 years of age and less than 65 years of age    Resident of the [place] or [facility]    Resident of a state where copay assistance is not prohibited    Private insurance does not prohibit coupons/copay assistance for SUBLOCADE    Prescribed SUBLOCADE for an indication approved by the Food and Drug    Administration
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
## 2024-05-01 DIAGNOSIS — F11.93 OPIOID WITHDRAWAL (H): ICD-10-CM

## 2024-05-05 ENCOUNTER — MYC MEDICAL ADVICE (OUTPATIENT)
Dept: FAMILY MEDICINE | Facility: CLINIC | Age: 29
End: 2024-05-05
Payer: COMMERCIAL

## 2024-05-05 DIAGNOSIS — F41.9 ANXIETY: Primary | ICD-10-CM

## 2024-05-06 RX ORDER — HYDROXYZINE HYDROCHLORIDE 10 MG/1
10 TABLET, FILM COATED ORAL 2 TIMES DAILY PRN
Qty: 30 TABLET | Refills: 1 | Status: CANCELLED | OUTPATIENT
Start: 2024-05-06

## 2024-05-06 NOTE — TELEPHONE ENCOUNTER
Called and left a message to call back. There appears to have some openings in the schedule tomorrow 5/7/24

## 2024-05-07 ENCOUNTER — OFFICE VISIT (OUTPATIENT)
Dept: FAMILY MEDICINE | Facility: CLINIC | Age: 29
End: 2024-05-07
Payer: COMMERCIAL

## 2024-05-07 VITALS
WEIGHT: 214 LBS | SYSTOLIC BLOOD PRESSURE: 128 MMHG | OXYGEN SATURATION: 98 % | BODY MASS INDEX: 35.65 KG/M2 | TEMPERATURE: 97 F | RESPIRATION RATE: 12 BRPM | HEIGHT: 65 IN | HEART RATE: 99 BPM | DIASTOLIC BLOOD PRESSURE: 85 MMHG

## 2024-05-07 DIAGNOSIS — F11.93 OPIOID WITHDRAWAL (H): ICD-10-CM

## 2024-05-07 DIAGNOSIS — F33.1 MODERATE EPISODE OF RECURRENT MAJOR DEPRESSIVE DISORDER (H): ICD-10-CM

## 2024-05-07 DIAGNOSIS — F51.4 NIGHT TERRORS: ICD-10-CM

## 2024-05-07 DIAGNOSIS — F41.1 GENERALIZED ANXIETY DISORDER: Primary | ICD-10-CM

## 2024-05-07 DIAGNOSIS — F11.20 OPIOID USE DISORDER, SEVERE, DEPENDENCE (H): ICD-10-CM

## 2024-05-07 PROCEDURE — 99214 OFFICE O/P EST MOD 30 MIN: CPT

## 2024-05-07 RX ORDER — CLONAZEPAM 0.5 MG/1
TABLET ORAL
COMMUNITY
Start: 2024-03-28 | End: 2024-05-07

## 2024-05-07 RX ORDER — ZOLPIDEM TARTRATE 5 MG/1
TABLET ORAL
Status: CANCELLED | OUTPATIENT
Start: 2024-05-07

## 2024-05-07 RX ORDER — ONDANSETRON 4 MG/1
4 TABLET, ORALLY DISINTEGRATING ORAL EVERY 8 HOURS PRN
Qty: 12 TABLET | Refills: 0 | Status: SHIPPED | OUTPATIENT
Start: 2024-05-07 | End: 2024-06-17

## 2024-05-07 RX ORDER — PRAZOSIN HYDROCHLORIDE 2 MG/1
CAPSULE ORAL
COMMUNITY
Start: 2024-05-01 | End: 2024-05-07

## 2024-05-07 RX ORDER — ZOLPIDEM TARTRATE 5 MG/1
TABLET ORAL
COMMUNITY
Start: 2024-01-18 | End: 2024-05-07 | Stop reason: ALTCHOICE

## 2024-05-07 RX ORDER — PRAZOSIN HYDROCHLORIDE 2 MG/1
4 CAPSULE ORAL AT BEDTIME
Qty: 60 CAPSULE | Refills: 5 | Status: SHIPPED | OUTPATIENT
Start: 2024-05-07 | End: 2024-06-17

## 2024-05-07 RX ORDER — CLONAZEPAM 0.5 MG/1
TABLET ORAL
Status: CANCELLED | OUTPATIENT
Start: 2024-05-07

## 2024-05-07 RX ORDER — IBUPROFEN 600 MG/1
600 TABLET, FILM COATED ORAL
COMMUNITY
Start: 2024-05-07 | End: 2024-08-21

## 2024-05-07 ASSESSMENT — ANXIETY QUESTIONNAIRES
8. IF YOU CHECKED OFF ANY PROBLEMS, HOW DIFFICULT HAVE THESE MADE IT FOR YOU TO DO YOUR WORK, TAKE CARE OF THINGS AT HOME, OR GET ALONG WITH OTHER PEOPLE?: EXTREMELY DIFFICULT
6. BECOMING EASILY ANNOYED OR IRRITABLE: NEARLY EVERY DAY
5. BEING SO RESTLESS THAT IT IS HARD TO SIT STILL: NEARLY EVERY DAY
IF YOU CHECKED OFF ANY PROBLEMS ON THIS QUESTIONNAIRE, HOW DIFFICULT HAVE THESE PROBLEMS MADE IT FOR YOU TO DO YOUR WORK, TAKE CARE OF THINGS AT HOME, OR GET ALONG WITH OTHER PEOPLE: EXTREMELY DIFFICULT
4. TROUBLE RELAXING: NEARLY EVERY DAY
1. FEELING NERVOUS, ANXIOUS, OR ON EDGE: NEARLY EVERY DAY
7. FEELING AFRAID AS IF SOMETHING AWFUL MIGHT HAPPEN: NEARLY EVERY DAY
GAD7 TOTAL SCORE: 21
3. WORRYING TOO MUCH ABOUT DIFFERENT THINGS: NEARLY EVERY DAY
2. NOT BEING ABLE TO STOP OR CONTROL WORRYING: NEARLY EVERY DAY
7. FEELING AFRAID AS IF SOMETHING AWFUL MIGHT HAPPEN: NEARLY EVERY DAY
GAD7 TOTAL SCORE: 21

## 2024-05-07 ASSESSMENT — PAIN SCALES - GENERAL: PAINLEVEL: MILD PAIN (2)

## 2024-05-07 NOTE — PROGRESS NOTES
Assessment & Plan     Generalized anxiety disorder  Worsened. Reports that she has tried multiple medications and clonazepam is the only medication that will work. I have made multiple mental health referrals for her to establish with psychiatry. She now has established at [facility]. We discussed with her history of multiple medication trials she would benefit from having psychiatry manage her mental health medications but also informed her with her recent substance abuse that clonazepam is not an appropriate medication choice to mange anxiety due to significant risk. She was tearful and frustrated throughout the visit today. Encouraged her to follow up with her psychiatrist at [facility] or can follow up with psychiatrist that she already saw through [facility].     Night terrors  Worsened lately with anxiety, has restarted taking her prazosin. Will refill this for her today. Blood pressure is stable.   - prazosin (MINIPRESS) 2 MG capsule  Dispense: 60 capsule; Refill: 5    Moderate episode of recurrent major depressive disorder (H)  Currently on desipramine and abilify. Has started seeing psychiatry at [facility]     Opioid use disorder, severe, dependence (H)  Following with recovery clinic, started on suboxone.     Opioid withdrawal (H)  Having some more nausea, will refill zofran for her today.   - ondansetron (ZOFRAN ODT) 4 MG ODT tab  Dispense: 12 tablet; Refill: 0        Subjective   Ileana is a 28 year old, presenting for the following health issues:  Recheck Medication (Refills and check in- ) and Forms (Doctors note for work )        5/7/2024     3:58 PM   Additional Questions   Roomed by ADELFO Maher         5/7/2024   Forms   Any forms needing to be completed Yes     History of Present Illness       Mental Health Follow-up:  Patient presents to follow-up on Depression & Anxiety.Patient's depression since last visit has been:  Medium  The patient is having other symptoms associated with  "depression.  Patient's anxiety since last visit has been:  Worse  The patient is having other symptoms associated with anxiety.  Any significant life events: job concerns, financial concerns and grief or loss  Patient is feeling anxious or having panic attacks.  Patient has no concerns about alcohol or drug use.      Was using fentanyl again is now being seen at the recovery clinic and started on suboxone.     Been having some more nausea, and poor appetite. Started about 2-3 weeks ago and thinks it is more related to her anxiety.   Still have significant anxiety. Night terrors are worsening again. Is requesting PRN clonazepam.   Anxiety making it difficult to concentrate, perform at her job, get adequate sleep. States the only thing that works is clonazepam. Reports has tried everything else for her anxiety including buspar, hydroxyzine which she says does not work. Trazodone has not been helpful for sleep.    Being seen at [facility] psychiatry - she feels they will not help her with her anxiety medications.    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      Review of Systems  CONSTITUTIONAL: NEGATIVE for fever, chills, change in weight  RESP: NEGATIVE for significant cough or SOB  CV: NEGATIVE for chest pain, palpitations or peripheral edema  GI: nausea and poor appetite  PSYCHIATRIC: anxiety, and insomnia       Objective    /85 (BP Location: Left arm, Patient Position: Sitting, Cuff Size: Adult Regular)   Pulse 99   Temp 97  F (36.1  C) (Tympanic)   Resp 12   Ht 1.651 m (5' 5\")   Wt 97.1 kg (214 lb)   LMP 05/04/2024 (Approximate)   SpO2 98%   BMI 35.61 kg/m    Body mass index is 35.61 kg/m .    Physical Exam   GENERAL: alert and fatigued  PSYCH: tearful and anxious          Signed Electronically by: LACY Blas CNP    "

## 2024-05-07 NOTE — LETTER
May 7, 2024      Ileana Thorne  [address]  [place] [address]        To Whom It May Concern:    Ileana Thorne  was seen on 5/7.  Please excuse her due to illness.        Sincerely,        LACY Blas CNP

## 2024-05-08 ENCOUNTER — TELEPHONE (OUTPATIENT)
Dept: BEHAVIORAL HEALTH | Facility: CLINIC | Age: 29
End: 2024-05-08
Payer: COMMERCIAL

## 2024-05-08 RX ORDER — CLONIDINE HYDROCHLORIDE 0.1 MG/1
0.1 TABLET ORAL 3 TIMES DAILY PRN
Qty: 9 TABLET | Refills: 11 | OUTPATIENT
Start: 2024-05-08

## 2024-05-08 NOTE — TELEPHONE ENCOUNTER
Patient was seen in the Recovery Clinic. Patient was due for follow up 5/6/24. Patient needs visit to Recovery Clinic for refills. Addressed by Recovery Clinic RN in separate encounter.    [place] [id number], [address]   [place], [place], [address]  Phone: [phone]  Fax: [phone]    Open Monday-Friday  Closed over lunch hour  Walk in hours: 9am-11:30am and 12:30-3pm    Chantal Jean-Baptiste RN on 5/8/2024 at 10:21 AM

## 2024-05-08 NOTE — TELEPHONE ENCOUNTER
Reason for call:  Other   Patient called regarding (reason for call): call back  Additional comments: pt is requesting a refill on sbxn and also to go up in dosing, I encouraged pt to come into clinic for walk in - please call pt     Phone number to reach patient:  Home number on file [phone] (home)    Best Time:  any     Can we leave a detailed message on this number?  YES    Travel screening: Negative

## 2024-05-08 NOTE — TELEPHONE ENCOUNTER
Patient due for follow up visit in the [facility] 5/6/24. Needs to be seen in clinic to discuss refills (requested clonidine refill in separate encounter, in addition to Suboxone). Pharmacy informed. LVM informing patient and Digital Map Productshart message sent.    [facility]  [facility], [address]   [place], [place], [address]  Phone: [phone]  Fax: [phone]    Open Monday-Friday  Closed over lunch hour  Walk in hours: 9am-11:30am and 12:30-3pm    Chantal Jean-Baptiste RN on 5/8/2024 at 10:29 AM

## 2024-05-09 ENCOUNTER — OFFICE VISIT (OUTPATIENT)
Dept: BEHAVIORAL HEALTH | Facility: CLINIC | Age: 29
End: 2024-05-09
Payer: COMMERCIAL

## 2024-05-09 ENCOUNTER — MYC REFILL (OUTPATIENT)
Dept: BEHAVIORAL HEALTH | Facility: CLINIC | Age: 29
End: 2024-05-09

## 2024-05-09 DIAGNOSIS — Z79.899 CHRONIC PRESCRIPTION BENZODIAZEPINE USE: ICD-10-CM

## 2024-05-09 DIAGNOSIS — F11.93 OPIOID WITHDRAWAL (H): ICD-10-CM

## 2024-05-09 DIAGNOSIS — F11.20 OPIOID USE DISORDER, SEVERE, DEPENDENCE (H): Primary | ICD-10-CM

## 2024-05-09 DIAGNOSIS — T40.2X5A THERAPEUTIC OPIOID-INDUCED CONSTIPATION (OIC): ICD-10-CM

## 2024-05-09 DIAGNOSIS — F32.A ANXIETY AND DEPRESSION: ICD-10-CM

## 2024-05-09 DIAGNOSIS — F41.9 ANXIETY AND DEPRESSION: ICD-10-CM

## 2024-05-09 DIAGNOSIS — K59.03 THERAPEUTIC OPIOID-INDUCED CONSTIPATION (OIC): ICD-10-CM

## 2024-05-09 LAB
AMPHETAMINE QUAL URINE POCT: NEGATIVE
BARBITURATE QUAL URINE POCT: NEGATIVE
BENZODIAZEPINE QUAL URINE POCT: NEGATIVE
BUPRENORPHINE QUAL URINE POCT: ABNORMAL
COCAINE QUAL URINE POCT: NEGATIVE
CREATININE QUAL URINE POCT: ABNORMAL
INTERNAL QC QUAL URINE POCT: ABNORMAL
MDMA QUAL URINE POCT: NEGATIVE
METHADONE QUAL URINE POCT: NEGATIVE
METHAMPHETAMINE QUAL URINE POCT: NEGATIVE
OPIATE QUAL URINE POCT: NEGATIVE
OXYCODONE QUAL URINE POCT: NEGATIVE
PH QUAL URINE POCT: ABNORMAL
PHENCYCLIDINE QUAL URINE POCT: NEGATIVE
POCT KIT EXPIRATION DATE: ABNORMAL
POCT KIT LOT NUMBER: ABNORMAL
SPECIFIC GRAVITY POCT: 1.02
TEMPERATURE URINE POCT: ABNORMAL
THC QUAL URINE POCT: NEGATIVE

## 2024-05-09 PROCEDURE — 99214 OFFICE O/P EST MOD 30 MIN: CPT | Performed by: NURSE PRACTITIONER

## 2024-05-09 PROCEDURE — 80305 DRUG TEST PRSMV DIR OPT OBS: CPT | Performed by: NURSE PRACTITIONER

## 2024-05-09 RX ORDER — BISACODYL 10 MG
10 SUPPOSITORY, RECTAL RECTAL DAILY PRN
Qty: 10 SUPPOSITORY | Refills: 0 | Status: SHIPPED | OUTPATIENT
Start: 2024-05-09 | End: 2024-08-21

## 2024-05-09 RX ORDER — BUPRENORPHINE AND NALOXONE 12; 3 MG/1; MG/1
1 FILM, SOLUBLE BUCCAL; SUBLINGUAL 2 TIMES DAILY
Qty: 30 FILM | Refills: 0 | Status: SHIPPED | OUTPATIENT
Start: 2024-05-09 | End: 2024-05-23

## 2024-05-09 RX ORDER — BUPRENORPHINE AND NALOXONE 8; 2 MG/1; MG/1
1 FILM, SOLUBLE BUCCAL; SUBLINGUAL DAILY
Qty: 15 FILM | Refills: 0 | Status: SHIPPED | OUTPATIENT
Start: 2024-05-09 | End: 2024-05-09

## 2024-05-09 RX ORDER — BISACODYL 5 MG/1
5 TABLET, DELAYED RELEASE ORAL DAILY PRN
Qty: 15 TABLET | Refills: 0 | Status: SHIPPED | OUTPATIENT
Start: 2024-05-09 | End: 2024-08-21

## 2024-05-09 RX ORDER — BUPRENORPHINE AND NALOXONE 8; 2 MG/1; MG/1
1 FILM, SOLUBLE BUCCAL; SUBLINGUAL DAILY
Qty: 15 FILM | Refills: 0 | Status: SHIPPED | OUTPATIENT
Start: 2024-05-09 | End: 2024-05-23

## 2024-05-09 ASSESSMENT — COLUMBIA-SUICIDE SEVERITY RATING SCALE - C-SSRS
2. HAVE YOU ACTUALLY HAD ANY THOUGHTS OF KILLING YOURSELF?: NO
REASONS FOR IDEATION SINCE LAST CONTACT: MOSTLY TO END OR STOP THE PAIN (YOU COULDN'T GO ON LIVING WITH THE PAIN OR HOW YOU WERE FEELING)
TOTAL  NUMBER OF ABORTED OR SELF INTERRUPTED ATTEMPTS SINCE LAST CONTACT: NO
SUICIDE, SINCE LAST CONTACT: NO
1. SINCE LAST CONTACT, HAVE YOU WISHED YOU WERE DEAD OR WISHED YOU COULD GO TO SLEEP AND NOT WAKE UP?: YES
6. HAVE YOU EVER DONE ANYTHING, STARTED TO DO ANYTHING, OR PREPARED TO DO ANYTHING TO END YOUR LIFE?: NO
TOTAL  NUMBER OF INTERRUPTED ATTEMPTS SINCE LAST CONTACT: NO
ATTEMPT SINCE LAST CONTACT: NO

## 2024-05-09 ASSESSMENT — PATIENT HEALTH QUESTIONNAIRE - PHQ9: SUM OF ALL RESPONSES TO PHQ QUESTIONS 1-9: 25

## 2024-05-09 NOTE — NURSING NOTE
[facility]    Pt returned to  for follow up visit, last visit was on 4/29/24 . Pt  would like to discuss suboxone increase, lyrica to 100mg 3tid with provider.   Rooming information:    Approximate last use of full opioid agonist: no since last visit   Taking buprenorphine? Yes:   How much per day? 24mg  Number of buprenorphine films/tablets remaining currently: 0  Side effects related to buprenorphine (constipation, dry mouth, sedation?) No   Narcan currently available: Yes  Other recent substance use:    Denies  NICOTINE-Yes: cig  If using nicotine, ready to quit? No    Point of care urine drug screen positive for:  Lab Results   Component Value Date    BUP Screen Positive (A) 05/09/2024    BZO Negative 05/09/2024    BAR Negative 05/09/2024    JOSEPH Negative 05/09/2024    MAMP Negative 05/09/2024    AMP Negative 05/09/2024    MDMA Negative 05/09/2024    MTD Negative 05/09/2024    [id number] Negative 05/09/2024    OXY Negative 05/09/2024    PCP Negative 05/09/2024    THC Negative 05/09/2024    TEMP 90 F 05/09/2024    SGPOCT 1.025 05/09/2024       *POC urine drug screen does not screen for Fentanyl    Pregnancy Status   LMP: Just got birth control removed nexoplan- and now on oral BC   Birth control/barriers: yes  Interested in birth control if none currently? NA        5/9/2024     1:00 PM   PHQ Assesment Total Score(s)   PHQ-9 Score 25       If PHQ-9 score of 15 or higher, has Recovery Clinic therapist or provider been notified? Yes    Any current suicidal ideation? Yes, Pt states just thoughts only  If yes, has [facility] Clinic therapist or provider been notified? No    Primary care provider: LACY Blas CNP     Mental health provider:  none (follow up on MH referral if needed)    Housing needs: stable    Insurance: active    Current legal issues: none    Contact information up to date? yes    3rd Party Involvement not today (please obtain BARBARA if pt would like to include)    Joe STEWART  ANTONELLA Car  May 9, 2024  1:09 PM

## 2024-05-09 NOTE — NURSING NOTE
"RN was notified by rooming staff that the patient had an elevated PHQ-9 score and answered greater than 0 on question 9 indicating thoughts that they would be better off dead, or hurting themself in some way. RN met with patient to complete the C-SSRS.    Patient denies active SI. She does have thoughts that she wishes she would not wake up a couple of times per week. Thoughts last for minutes at a time. Per chart review, she does have a history of past attempts.     Patient  denies current or recent suicidal ideation or behavior    [place]-Suicide Severity Rating Scale (C-SSRS) score: low risk    RN updated the provider with C-SSRS risk level.     Current stressors/contributing factors include: \"Lots of stuff\"    Patient identified the following protective factors: \"I wouldn't do it\"     Patient was given the number for the National Suicide Prevention Line (988) along with a list of crisis resources and numbers.      Felicity Diallo RN on 5/9/2024 at 1:35 PM     "

## 2024-05-09 NOTE — PATIENT INSTRUCTIONS
Take miralax daily ,ay also take senna.   Increase water intake.    Use dulcolax if symptoms do not improve with miralax and senna.     Increase suboxone to 12 mg twice daily, and 8 mg midday.     Download meeting guide from [employer] store.
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
## 2024-05-10 PROBLEM — F33.9 MAJOR DEPRESSION, RECURRENT (H): Status: ACTIVE | Noted: 2024-05-10

## 2024-05-10 RX ORDER — CLONIDINE HYDROCHLORIDE 0.1 MG/1
0.1 TABLET ORAL 3 TIMES DAILY PRN
Qty: 9 TABLET | Refills: 0 | Status: SHIPPED | OUTPATIENT
Start: 2024-05-10 | End: 2024-06-14

## 2024-05-14 RX ORDER — PREGABALIN 100 MG/1
100 CAPSULE ORAL 3 TIMES DAILY
Qty: 90 CAPSULE | Refills: 0 | OUTPATIENT
Start: 2024-05-14

## 2024-05-23 ENCOUNTER — OFFICE VISIT (OUTPATIENT)
Dept: BEHAVIORAL HEALTH | Facility: CLINIC | Age: 29
End: 2024-05-23
Payer: COMMERCIAL

## 2024-05-23 VITALS — DIASTOLIC BLOOD PRESSURE: 94 MMHG | HEART RATE: 83 BPM | SYSTOLIC BLOOD PRESSURE: 145 MMHG

## 2024-05-23 DIAGNOSIS — F43.10 PTSD (POST-TRAUMATIC STRESS DISORDER): ICD-10-CM

## 2024-05-23 DIAGNOSIS — F11.20 OPIOID USE DISORDER, SEVERE, DEPENDENCE (H): Primary | ICD-10-CM

## 2024-05-23 DIAGNOSIS — F33.9 EPISODE OF RECURRENT MAJOR DEPRESSIVE DISORDER, UNSPECIFIED DEPRESSION EPISODE SEVERITY (H): ICD-10-CM

## 2024-05-23 DIAGNOSIS — F41.1 GENERALIZED ANXIETY DISORDER: ICD-10-CM

## 2024-05-23 DIAGNOSIS — Z30.09 ENCOUNTER FOR COUNSELING REGARDING CONTRACEPTION: ICD-10-CM

## 2024-05-23 DIAGNOSIS — T40.2X5A THERAPEUTIC OPIOID-INDUCED CONSTIPATION (OIC): ICD-10-CM

## 2024-05-23 DIAGNOSIS — K59.03 THERAPEUTIC OPIOID-INDUCED CONSTIPATION (OIC): ICD-10-CM

## 2024-05-23 LAB
AMPHETAMINE QUAL URINE POCT: NEGATIVE
BARBITURATE QUAL URINE POCT: NEGATIVE
BENZODIAZEPINE QUAL URINE POCT: NEGATIVE
BUPRENORPHINE QUAL URINE POCT: ABNORMAL
COCAINE QUAL URINE POCT: NEGATIVE
CREAT UR-MCNC: 171 MG/DL
CREATININE QUAL URINE POCT: ABNORMAL
HCG UR QL: NEGATIVE
INTERNAL QC QUAL URINE POCT: ABNORMAL
MDMA QUAL URINE POCT: NEGATIVE
METHADONE QUAL URINE POCT: NEGATIVE
METHAMPHETAMINE QUAL URINE POCT: NEGATIVE
OPIATE QUAL URINE POCT: NEGATIVE
OXYCODONE QUAL URINE POCT: NEGATIVE
PH QUAL URINE POCT: ABNORMAL
PHENCYCLIDINE QUAL URINE POCT: NEGATIVE
POCT KIT EXPIRATION DATE: ABNORMAL
POCT KIT LOT NUMBER: ABNORMAL
SPECIFIC GRAVITY POCT: 1.02
TEMPERATURE URINE POCT: ABNORMAL
THC QUAL URINE POCT: ABNORMAL

## 2024-05-23 PROCEDURE — G0480 DRUG TEST DEF 1-7 CLASSES: HCPCS | Performed by: NURSE PRACTITIONER

## 2024-05-23 PROCEDURE — 99214 OFFICE O/P EST MOD 30 MIN: CPT | Performed by: NURSE PRACTITIONER

## 2024-05-23 PROCEDURE — 80305 DRUG TEST PRSMV DIR OPT OBS: CPT | Performed by: NURSE PRACTITIONER

## 2024-05-23 PROCEDURE — 81025 URINE PREGNANCY TEST: CPT | Performed by: NURSE PRACTITIONER

## 2024-05-23 RX ORDER — BUPRENORPHINE AND NALOXONE 8; 2 MG/1; MG/1
1 FILM, SOLUBLE BUCCAL; SUBLINGUAL DAILY
Qty: 18 FILM | Refills: 0 | Status: SHIPPED | OUTPATIENT
Start: 2024-05-23 | End: 2024-06-07

## 2024-05-23 RX ORDER — BUPRENORPHINE AND NALOXONE 12; 3 MG/1; MG/1
1 FILM, SOLUBLE BUCCAL; SUBLINGUAL 2 TIMES DAILY
Qty: 36 FILM | Refills: 0 | Status: SHIPPED | OUTPATIENT
Start: 2024-05-23 | End: 2024-06-07

## 2024-05-23 ASSESSMENT — PATIENT HEALTH QUESTIONNAIRE - PHQ9: SUM OF ALL RESPONSES TO PHQ QUESTIONS 1-9: 24

## 2024-05-23 NOTE — NURSING NOTE
[facility]    Pt returned to  for follow up visit, last visit was on 5/9/24 . Pt would like to discuss with provider increasing suboxone dose  Rooming information:    Approximate last use of full opioid agonist: 5/9/23  Taking buprenorphine? Yes: suboxone  How much per day? 24mg  Number of buprenorphine films/tablets remaining currently:  todays dose  Side effects related to buprenorphine (constipation, dry mouth, sedation?) No   Narcan currently available: Yes  Other recent substance use:    Cocaine   NICOTINE-Yes: cig  If using nicotine, ready to quit? No    Point of care urine drug screen positive for:  Lab Results   Component Value Date    BUP Screen Positive (A) 05/23/2024    BZO Negative 05/23/2024    BAR Negative 05/23/2024    JOSEPH Negative 05/23/2024    MAMP Negative 05/23/2024    AMP Negative 05/23/2024    MDMA Negative 05/23/2024    MTD Negative 05/23/2024    [id number] Negative 05/23/2024    OXY Negative 05/23/2024    PCP Negative 05/23/2024    THC Screen Positive (A) 05/23/2024    TEMP Invalid (A) 05/23/2024    SGPOCT 1.025 05/23/2024       *POC urine drug screen does not screen for Fentanyl    Pregnancy Status   LMP: 5/22/24  Birth control/barriers: pill  Interested in birth control if none currently? No        5/23/2024    11:00 AM   PHQ Assesment Total Score(s)   PHQ-9 Score 24       If PHQ-9 score of 15 or higher, has [facility] Clinic therapist or provider been notified? Yes    Any current suicidal ideation? No  If yes, has [facility] therapist or provider been notified? No    Primary care provider: LACY Blas CNP     Mental health provider: none (follow up on MH referral if needed)    Housing needs: stable    Insurance: active    Current legal issues: none    Contact information up to date? yes    3rd Party Involvement not today (please obtain BARBARA if pt would like to include)    Joe Car MA  May 23, 2024  11:01 AM
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
## 2024-05-30 ENCOUNTER — VIRTUAL VISIT (OUTPATIENT)
Dept: URGENT CARE | Facility: CLINIC | Age: 29
End: 2024-05-30
Payer: COMMERCIAL

## 2024-05-30 DIAGNOSIS — R10.9 ABDOMINAL DISCOMFORT: Primary | ICD-10-CM

## 2024-05-30 DIAGNOSIS — K59.03 DRUG-INDUCED CONSTIPATION: ICD-10-CM

## 2024-05-30 LAB
BUPRENORPHINE UR CFM-MCNC: 265 NG/ML
BUPRENORPHINE/CREAT UR: 155 NG/MG {CREAT}
BZE UR CFM-MCNC: 197 NG/ML
BZE/CREAT UR: 115 NG/MG {CREAT}
LORAZEPAM UR QL CFM: PRESENT
NALOXONE UR CFM-MCNC: 5840 NG/ML
NALOXONE: 3415 NG/MG {CREAT}
NORBUPRENORPHINE UR CFM-MCNC: 2220 NG/ML
NORBUPRENORPHINE/CREAT UR: 1298 NG/MG {CREAT}
PREGABALIN UR QL CFM: PRESENT

## 2024-05-30 PROCEDURE — 99443 PR PHYSICIAN TELEPHONE EVALUATION 21-30 MIN: CPT | Mod: 95

## 2024-05-30 NOTE — PROGRESS NOTES
Ileana is a 28 year old who is being evaluated via a billable video visit.    How would you like to obtain your AVS? MyChart  If the video visit is dropped, the invitation should be resent by: Text to cell phone:  Will anyone else be joining your video visit? No      Assessment & Plan   Diagnoses and all orders for this visit:    Abdominal discomfort    Drug induced constipation.      I discussed with pt that video/telephone visits can be difficult to fully evaluate abdomen discomfort, but she has no red flags at this time including : fever, severe pain, BRBPR, melena, hematochezia, coffee ground emesis.  She does have known constipation related to suboxone use and her provider has given priro recommendations including mirilax, dulculax and has now been  given her movantic (which pt just got today)  which should be helpful with in a few days.    I recommend a face to face visit in urgent care/ED or her treating provider if not improving with her current treatment plan with in 3-5 days or if she develops worsening pain, not able to keep fluids down, BRBPR, melena, or coffee ground emesis.    Encouraged small frequent amount of clear/full/easily digestible foods, continued zofran.    Pt agreeable with plan.          See Patient Instructions      Subjective   Ileana is a 28 year old, presenting for the following health issues:  Abdominal Pain (Constipation and abdomen discomfort x 1 month since starting suboxone.  )        Video Start Time: 6:21 PM    HPI   Since on suboxone has had stomach problems.    Has been constipated and has had some nausea.    Mirilax daily.   BM 2 x per week,  nausea, not passing gas well.    Not eating well due to symptoms.    Increased nausea with eating.    Emesis : no blood, no coffee ground emesis.    No blood in the stool.    No melena.    Bothers most mid abdomen above umbilicus.    Feels better with laying on stomach.    No irritative voiding symptoms.    No blood in the urine.    No  vaginal sx.    Off wegovy.    No fevers.      Patient Active Problem List   Diagnosis    Essential hypertension    Generalized anxiety disorder    Methamphetamine addiction (H)    Tobacco dependence syndrome    History of pre-eclampsia in prior pregnancy, currently pregnant in second trimester    Substance abuse affecting pregnancy in second trimester, antepartum (H)    Mixed anxiety depressive disorder    Insomnia    Pregnancy-induced hypertension    Opioid use disorder, severe, dependence (H)    Depression with suicidal ideation    Night terrors    Anxiety    History of pulmonary embolism    Cervicalgia    Other chronic pain    Methamphetamine abuse in remission (H)    PTSD (post-traumatic stress disorder)    Class 2 severe obesity due to excess calories with serious comorbidity in adult (H)    Major depression, recurrent (H)     Current Outpatient Medications   Medication Sig Dispense Refill    ARIPiprazole (ABILIFY) 10 MG tablet Take 1 tablet (10 mg) by mouth at bedtime 90 tablet 0    bisacodyl (DULCOLAX) 10 MG suppository Place 1 suppository (10 mg) rectally daily as needed for constipation 10 suppository 0    bisacodyl (DULCOLAX) 5 MG EC tablet Take 1 tablet (5 mg) by mouth daily as needed for constipation 15 tablet 0    Buprenorphine HCl-Naloxone HCl (SUBOXONE) 12-3 MG FILM per film Place 1 Film under the tongue 2 times daily 36 Film 0    buprenorphine HCl-naloxone HCl (SUBOXONE) 8-2 MG per film Place 1 Film under the tongue daily 18 Film 0    cloNIDine (CATAPRES) 0.1 MG tablet Take 1 tablet (0.1 mg) by mouth 3 times daily as needed 9 tablet 0    cyclobenzaprine (FLEXERIL) 10 MG tablet Take 1 tablet (10 mg) by mouth every 8 hours as needed for muscle spasms (chronic pain) 30 tablet 0    desipramine (NORPRAMIN) 150 MG tablet Take 1 tablet (150 mg) by mouth at bedtime 30 tablet 1    ibuprofen (ADVIL/MOTRIN) 600 MG tablet Take 600 mg by mouth      naloxegol (MOVANTIK) 25 MG TABS tablet Take 1 tablet (25 mg) by  mouth every morning (before breakfast) 30 tablet 0    naloxone (NARCAN) 4 MG/0.1ML nasal spray Spray 1 spray (4 mg) into one nostril alternating nostrils as needed for opioid reversal every 2-3 minutes until assistance arrives (Patient not taking: Reported on 5/7/2024) 0.2 mL 3    nicotine (NICODERM CQ) 21 MG/24HR 24 hr patch Place 1 patch onto the skin every 24 hours 30 patch 0    norethindrone (MICRONOR) 0.35 MG tablet Take 1 tablet (0.35 mg) by mouth daily (Patient not taking: Reported on 5/7/2024) 90 tablet 1    ondansetron (ZOFRAN ODT) 4 MG ODT tab Take 1 tablet (4 mg) by mouth every 8 hours as needed for nausea 12 tablet 0    polyethylene glycol (MIRALAX) 17 GM/Dose powder Take 17 g (1 Capful) by mouth daily 578 g 11    prazosin (MINIPRESS) 2 MG capsule Take 2 capsules (4 mg) by mouth at bedtime 60 capsule 5    pregabalin (LYRICA) 100 MG capsule Take 1 capsule (100 mg) by mouth 3 times daily 90 capsule 0    senna-docusate (SENOKOT-S/PERICOLACE) 8.6-50 MG tablet Take 1-2 tablets by mouth 2 times daily 30 tablet 0    Ulipristal Acetate (MARCELA) 30 MG tablet Take 1 tablet (30 mg) by mouth once for 1 dose 1 tablet 0    Ulipristal Acetate (MARCELA) 30 MG tablet Take 1 tablet (30 mg) by mouth once for 1 dose 1 tablet 0     No current facility-administered medications for this visit.             Objective       Vitals:  No vitals were obtained today due to virtual visit.    Physical Exam     Patient is alert and oriented. Able to speak in full sentences. No wheezing or cough heard during telephone conversation. Mood is appropriate.        Video-Visit Details    Type of service:  Video Visit , transition to telephone as not able to get video connection.    Start time: 6:00 pm    End Time:6:21 PM  Originating Location (pt. Location): Home    Distant Location (provider location):  On-site  Platform used for Video Visit: Christiano  Signed Electronically by: [facility]

## 2024-05-30 NOTE — PATIENT INSTRUCTIONS
Start the movantik as ordered by your treating provider.    Small easily digestible fluids/soft foods frequently rather than as large meals as discussed.  Gatorade, broth, apple sauce, cottage cheese, pedialyte.  Advance slowly if tolerating well.    Avoid foods that are more difficult to digest until feeling better: meats, raw fruits and veggies, high residue foods.    If you have fever, worsening pain, vomiting persists, blood in stool, black stool, coffee ground appearence to your vomiting,  or not improvement of constipation with in 3-5 days of using the Movantik please be seen in a  face to face visit in urgent care, ER, or your treating provider.

## 2024-06-07 ENCOUNTER — OFFICE VISIT (OUTPATIENT)
Dept: BEHAVIORAL HEALTH | Facility: CLINIC | Age: 29
End: 2024-06-07
Payer: COMMERCIAL

## 2024-06-07 ENCOUNTER — TELEPHONE (OUTPATIENT)
Dept: BEHAVIORAL HEALTH | Facility: CLINIC | Age: 29
End: 2024-06-07

## 2024-06-07 VITALS — SYSTOLIC BLOOD PRESSURE: 132 MMHG | HEART RATE: 67 BPM | DIASTOLIC BLOOD PRESSURE: 90 MMHG

## 2024-06-07 DIAGNOSIS — F11.93 OPIOID WITHDRAWAL (H): ICD-10-CM

## 2024-06-07 DIAGNOSIS — M79.7 FIBROMYALGIA: ICD-10-CM

## 2024-06-07 DIAGNOSIS — F11.20 OPIOID USE DISORDER, SEVERE, DEPENDENCE (H): Primary | ICD-10-CM

## 2024-06-07 DIAGNOSIS — F41.8 MIXED ANXIETY DEPRESSIVE DISORDER: ICD-10-CM

## 2024-06-07 LAB
AMPHETAMINE QUAL URINE POCT: NEGATIVE
BARBITURATE QUAL URINE POCT: NEGATIVE
BENZODIAZEPINE QUAL URINE POCT: NEGATIVE
BUPRENORPHINE QUAL URINE POCT: ABNORMAL
COCAINE QUAL URINE POCT: NEGATIVE
CREATININE QUAL URINE POCT: NEGATIVE
INTERNAL QC QUAL URINE POCT: ABNORMAL
MDMA QUAL URINE POCT: NEGATIVE
METHADONE QUAL URINE POCT: NEGATIVE
METHAMPHETAMINE QUAL URINE POCT: NEGATIVE
OPIATE QUAL URINE POCT: NEGATIVE
OXYCODONE QUAL URINE POCT: NEGATIVE
PH QUAL URINE POCT: ABNORMAL
PHENCYCLIDINE QUAL URINE POCT: NEGATIVE
POCT KIT EXPIRATION DATE: ABNORMAL
POCT KIT LOT NUMBER: ABNORMAL
SPECIFIC GRAVITY POCT: 1.02
TEMPERATURE URINE POCT: ABNORMAL
THC QUAL URINE POCT: NEGATIVE

## 2024-06-07 PROCEDURE — 99417 PROLNG OP E/M EACH 15 MIN: CPT

## 2024-06-07 PROCEDURE — 80305 DRUG TEST PRSMV DIR OPT OBS: CPT

## 2024-06-07 PROCEDURE — 99215 OFFICE O/P EST HI 40 MIN: CPT

## 2024-06-07 RX ORDER — PREGABALIN 100 MG/1
100 CAPSULE ORAL 3 TIMES DAILY
Qty: 90 CAPSULE | Refills: 0 | Status: SHIPPED | OUTPATIENT
Start: 2024-06-07 | End: 2024-07-05

## 2024-06-07 RX ORDER — EPINEPHRINE 1 MG/ML
0.3 INJECTION, SOLUTION, CONCENTRATE INTRAVENOUS EVERY 5 MIN PRN
Status: CANCELLED | OUTPATIENT
Start: 2024-06-21

## 2024-06-07 RX ORDER — ALBUTEROL SULFATE 90 UG/1
1-2 AEROSOL, METERED RESPIRATORY (INHALATION)
Status: CANCELLED
Start: 2024-06-21

## 2024-06-07 RX ORDER — METHYLPREDNISOLONE SODIUM SUCCINATE 125 MG/2ML
125 INJECTION, POWDER, LYOPHILIZED, FOR SOLUTION INTRAMUSCULAR; INTRAVENOUS
Status: CANCELLED
Start: 2024-06-21

## 2024-06-07 RX ORDER — DIPHENHYDRAMINE HYDROCHLORIDE 50 MG/ML
50 INJECTION INTRAMUSCULAR; INTRAVENOUS
Status: CANCELLED
Start: 2024-06-21

## 2024-06-07 RX ORDER — PREGABALIN 100 MG/1
100 CAPSULE ORAL 3 TIMES DAILY
Qty: 90 CAPSULE | Refills: 0 | Status: SHIPPED | OUTPATIENT
Start: 2024-06-07 | End: 2024-06-07

## 2024-06-07 RX ORDER — LIDOCAINE HYDROCHLORIDE 10 MG/ML
2 INJECTION, SOLUTION EPIDURAL; INFILTRATION; INTRACAUDAL; PERINEURAL ONCE
Status: CANCELLED | OUTPATIENT
Start: 2024-06-21 | End: 2024-06-21

## 2024-06-07 RX ORDER — BUPRENORPHINE AND NALOXONE 12; 3 MG/1; MG/1
1 FILM, SOLUBLE BUCCAL; SUBLINGUAL 2 TIMES DAILY
Qty: 28 FILM | Refills: 0 | Status: SHIPPED | OUTPATIENT
Start: 2024-06-07 | End: 2024-06-14 | Stop reason: DRUGHIGH

## 2024-06-07 RX ORDER — ALBUTEROL SULFATE 0.83 MG/ML
2.5 SOLUTION RESPIRATORY (INHALATION)
Status: CANCELLED | OUTPATIENT
Start: 2024-06-21

## 2024-06-07 RX ORDER — DESIPRAMINE HYDROCHLORIDE 25 MG/1
TABLET ORAL
Qty: 60 TABLET | Refills: 0 | Status: SHIPPED | OUTPATIENT
Start: 2024-06-07 | End: 2024-08-21

## 2024-06-07 RX ORDER — BUPRENORPHINE AND NALOXONE 8; 2 MG/1; MG/1
1 FILM, SOLUBLE BUCCAL; SUBLINGUAL DAILY
Qty: 14 FILM | Refills: 0 | Status: SHIPPED | OUTPATIENT
Start: 2024-06-07 | End: 2024-06-17

## 2024-06-07 ASSESSMENT — PATIENT HEALTH QUESTIONNAIRE - PHQ9: SUM OF ALL RESPONSES TO PHQ QUESTIONS 1-9: 20

## 2024-06-07 NOTE — NURSING NOTE
[facility]      Rooming information:    Approximate last use of full opioid agonist: 5/9/24  Taking buprenorphine? Yes: suboxone  How much per day? 24-32mg  Number of buprenorphine films/tablets remaining currently: 0  Side effects related to buprenorphine (constipation, dry mouth, sedation?) No   Narcan currently available: Yes  Other recent substance use:    Denies  NICOTINE-Yes: cig  If using nicotine, ready to quit? No    Point of care urine drug screen positive for:  Lab Results   Component Value Date    BUP Screen Positive (A) 06/07/2024    BZO Negative 06/07/2024    BAR Negative 06/07/2024    JOSEPH Negative 06/07/2024    MAMP Negative 06/07/2024    AMP Negative 06/07/2024    MDMA Negative 06/07/2024    MTD Negative 06/07/2024    [id number] Negative 06/07/2024    OXY Negative 06/07/2024    PCP Negative 06/07/2024    THC Negative 06/07/2024    TEMP Invalid (A) 06/07/2024    SGPOCT 1.025 06/07/2024       *POC urine drug screen does not screen for Fentanyl    Pregnancy Status   LMP: 5/22/24  Birth control/barriers: pill  Interested in birth control if none currently? No    Depression Response    Patient completed the PHQ-9 assessment for depression and scored >9? Yes  Question 9 on the PHQ-9 was positive for suicidality? No  Does patient have current mental health provider? Yes    Is this a virtual visit? No    I personally notified the following: MOOKIE          6/7/2024    10:00 AM   PHQ Assesment Total Score(s)   PHQ-9 Score 20         Housing needs: stable    Insurance: active    Current legal issues: none    Contact information up to date? yes    3rd Party Involvement not today (please obtain BARBARA if pt would like to include)    Joe Car MA  June 7, 2024  10:17 AM

## 2024-06-07 NOTE — TELEPHONE ENCOUNTER
- I have reviewed recommendations for comprehensive treatment plan with the patient  - I have reviewed the patient's medications comprehensively  and provided education to the patient on risks associated with concurrent use of benzodiazepines, alcohol, other sedatives with opioids  - I have recommended concomitant psychosocial support  - I have complied with all aspects of REMS program for Sublocade. [facility] where Sublocade will be administered is in compliance with all aspects of REMS program.   - Patient meets DSM-5 criteria for moderate or severe opioid use disorder  - Patient has been prescribed buprenorphine 8-24mg/day for at least one 1 week, demonstrating control of cravings and withdrawal symptoms as well as tolerance of buprenorphine.   - Patient will discontinue sublingual buprenorphine upon initiation of Sublocade  - Patient does not have evidence of tampering or attempts to remove the depot at the injection site.   - Patient does not have severe hepatic impairment.   - Patient does not have adrenal insufficiency.   - Patient does not have a history of long QT syndrome  - Patient does not take any antiarrhythmic medications or other medications known to significantly prolong QT interval   - Urine Drug Screen on 6/7/24 was positive for buprenorphine  - Patient will not be receiving methadone while on Sublocade  - Patient will not be receiving any other long acting buprenorphine products or long acting naltrexone while on Sublocade    - [place]  reflecting [place]up[place]ine prescriptions including strengths and dates:

## 2024-06-07 NOTE — PATIENT INSTRUCTIONS
Buprenorphine Tips:  Make sure you are letting your buprenorphine tablets or films dissolve completely under your tongue so you know you are getting all the medication.  Don't eat, drink, or talk while taking your buprenorphine.  Avoid nicotine for 15-30 minutes before taking buprenorphine - this can cause the blood vessels to constrict and you may not absorb the medication as well.  To avoid potential dental issues related to buprenorphine rinse your mouth with water after taking your dose.  Avoid brushing your teeth for 1 hour after taking a dose.     Constipation is the most common side effect of buprenorphine.  Here are some simple things you can try to ease constipation.  Eating more fiber (fruits, vegetable, and whole grains) and drinking enough fluid (urine should be light yellow).    Take Miralax (polyethylene gylcol).  Use 1 capful daily until you start to have loose stools and then decrease use.  You can also try Colace (docusate) 100mg twice daily as needed.  These medications can be prescribed or purchased OTC.  Let your provider know if you are still struggling with constipation.    What to do if you experience nausea or upset stomach after taking buprenorphine:  Make sure you are letting it dissolve completely.  After the medication has dissolved try spitting out your saliva instead of swallowing it.    Other common side effects include:   - sleepiness/drowsiness OR trouble sleeping  - headaches    Please discuss any side effects with your provider.    Important safety info:  Ensure your medication is stored in a safe place out of sight and out of reach from kids and pets, ideally locked away.   Do not combine buprenorphine with other sedating substances or medications such as alcohol, benzodiazepines (Xanax or alprazolam for example), or other opioids.  Only take medications as prescribed.         Sublocade Scheduling at Infusion Center     Your provider has ordered the injectable medication,  Sublocade, as part of your treatment plan. This medication must be approved by your insurance company before it can be administered. That process is called a prior authorization. To start the prior authorization process, you need to schedule a Sublocade appointment at the [facility].     Please call the [facility] at [phone] to schedule your Sublocade injection as soon as possible.     If your insurance does not approve the Sublocade injection before your appointment, you will be contacted to reschedule your appointment at the [facility].     [facility]   [address], 2nd [place] [address]   [phone]     Other potential sites for Sublocade administration:  [facility] ([place]): [phone], option #3  [facility] ([place]): [phone]   [facility] ([place]): [phone]   [facility]): [phone]     Copays and Deductibles     If you have private or employer-based insurance, your insurance company may approve the Sublocade prior authorization, however may not cover any copay or deductible costs. Please contact your insurance directly regarding copay/deductible costs.     If you need copay/deductible assistance, contact [employer] at [email or web address] or [phone].     Eligibility requirements for copay/deductible assistance for patients with private insurance are as follows:      Private health insurance not funded by a government organization    At least 18 years of age and less than 65 years of age    Resident of the [place] or [facility]    Resident of a state where copay assistance is not prohibited    Private insurance does not prohibit coupons/copay assistance for SUBLOCADE    Prescribed SUBLOCADE for an indication approved by the Food and Drug    Administration
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
## 2024-06-14 ENCOUNTER — OFFICE VISIT (OUTPATIENT)
Dept: BEHAVIORAL HEALTH | Facility: CLINIC | Age: 29
End: 2024-06-14
Payer: COMMERCIAL

## 2024-06-14 ENCOUNTER — INFUSION THERAPY VISIT (OUTPATIENT)
Dept: INFUSION THERAPY | Facility: CLINIC | Age: 29
End: 2024-06-14
Payer: COMMERCIAL

## 2024-06-14 VITALS — DIASTOLIC BLOOD PRESSURE: 85 MMHG | HEART RATE: 79 BPM | TEMPERATURE: 97.2 F | SYSTOLIC BLOOD PRESSURE: 158 MMHG

## 2024-06-14 DIAGNOSIS — F11.20 OPIOID USE DISORDER, SEVERE, DEPENDENCE (H): Primary | ICD-10-CM

## 2024-06-14 DIAGNOSIS — F33.9 EPISODE OF RECURRENT MAJOR DEPRESSIVE DISORDER, UNSPECIFIED DEPRESSION EPISODE SEVERITY (H): ICD-10-CM

## 2024-06-14 DIAGNOSIS — T40.2X5A THERAPEUTIC OPIOID-INDUCED CONSTIPATION (OIC): ICD-10-CM

## 2024-06-14 DIAGNOSIS — F41.9 ANXIETY: ICD-10-CM

## 2024-06-14 DIAGNOSIS — K59.03 THERAPEUTIC OPIOID-INDUCED CONSTIPATION (OIC): ICD-10-CM

## 2024-06-14 DIAGNOSIS — F19.10 POLYDRUG ABUSE (H): ICD-10-CM

## 2024-06-14 DIAGNOSIS — F33.1 MAJOR DEPRESSIVE DISORDER, RECURRENT EPISODE, MODERATE (H): Primary | ICD-10-CM

## 2024-06-14 DIAGNOSIS — F41.1 GENERALIZED ANXIETY DISORDER: ICD-10-CM

## 2024-06-14 LAB
AMPHETAMINE QUAL URINE POCT: NEGATIVE
BARBITURATE QUAL URINE POCT: NEGATIVE
BENZODIAZEPINE QUAL URINE POCT: NEGATIVE
BUPRENORPHINE QUAL URINE POCT: ABNORMAL
COCAINE QUAL URINE POCT: NEGATIVE
CREATININE QUAL URINE POCT: ABNORMAL
HCG UR QL: NEGATIVE
INTERNAL QC QUAL URINE POCT: ABNORMAL
MDMA QUAL URINE POCT: NEGATIVE
METHADONE QUAL URINE POCT: NEGATIVE
METHAMPHETAMINE QUAL URINE POCT: NEGATIVE
OPIATE QUAL URINE POCT: NEGATIVE
OXYCODONE QUAL URINE POCT: NEGATIVE
PH QUAL URINE POCT: ABNORMAL
PHENCYCLIDINE QUAL URINE POCT: NEGATIVE
POCT KIT EXPIRATION DATE: ABNORMAL
POCT KIT LOT NUMBER: ABNORMAL
SPECIFIC GRAVITY POCT: 1.02
TEMPERATURE URINE POCT: ABNORMAL
THC QUAL URINE POCT: NEGATIVE

## 2024-06-14 PROCEDURE — 80305 DRUG TEST PRSMV DIR OPT OBS: CPT | Performed by: NURSE PRACTITIONER

## 2024-06-14 PROCEDURE — 99214 OFFICE O/P EST MOD 30 MIN: CPT | Performed by: NURSE PRACTITIONER

## 2024-06-14 PROCEDURE — 250N000009 HC RX 250

## 2024-06-14 PROCEDURE — 99207 PR NO CHARGE LOS: CPT

## 2024-06-14 PROCEDURE — 250N000011 HC RX IP 250 OP 636: Mod: JZ

## 2024-06-14 PROCEDURE — 81025 URINE PREGNANCY TEST: CPT | Performed by: NURSE PRACTITIONER

## 2024-06-14 PROCEDURE — 96372 THER/PROPH/DIAG INJ SC/IM: CPT

## 2024-06-14 PROCEDURE — 90832 PSYTX W PT 30 MINUTES: CPT | Performed by: SOCIAL WORKER

## 2024-06-14 RX ORDER — LIDOCAINE HYDROCHLORIDE 10 MG/ML
2 INJECTION, SOLUTION EPIDURAL; INFILTRATION; INTRACAUDAL; PERINEURAL ONCE
Status: COMPLETED | OUTPATIENT
Start: 2024-06-14 | End: 2024-06-14

## 2024-06-14 RX ORDER — ALBUTEROL SULFATE 0.83 MG/ML
2.5 SOLUTION RESPIRATORY (INHALATION)
OUTPATIENT
Start: 2024-07-12

## 2024-06-14 RX ORDER — DIPHENHYDRAMINE HYDROCHLORIDE 50 MG/ML
50 INJECTION INTRAMUSCULAR; INTRAVENOUS
Start: 2024-07-12

## 2024-06-14 RX ORDER — EPINEPHRINE 1 MG/ML
0.3 INJECTION, SOLUTION, CONCENTRATE INTRAVENOUS EVERY 5 MIN PRN
OUTPATIENT
Start: 2024-07-12

## 2024-06-14 RX ORDER — METHYLPREDNISOLONE SODIUM SUCCINATE 125 MG/2ML
125 INJECTION, POWDER, LYOPHILIZED, FOR SOLUTION INTRAMUSCULAR; INTRAVENOUS
Start: 2024-07-12

## 2024-06-14 RX ORDER — ALBUTEROL SULFATE 90 UG/1
1-2 AEROSOL, METERED RESPIRATORY (INHALATION)
Start: 2024-07-12

## 2024-06-14 RX ORDER — CLONIDINE HYDROCHLORIDE 0.1 MG/1
0.1 TABLET ORAL 3 TIMES DAILY PRN
Qty: 60 TABLET | Refills: 0 | Status: SHIPPED | OUTPATIENT
Start: 2024-06-14 | End: 2024-08-21

## 2024-06-14 RX ORDER — LIDOCAINE HYDROCHLORIDE 10 MG/ML
2 INJECTION, SOLUTION EPIDURAL; INFILTRATION; INTRACAUDAL; PERINEURAL ONCE
OUTPATIENT
Start: 2024-07-12 | End: 2024-07-12

## 2024-06-14 RX ADMIN — BUPRENORPHINE 300 MG: 300 SOLUTION SUBCUTANEOUS at 09:41

## 2024-06-14 RX ADMIN — LIDOCAINE HYDROCHLORIDE 2 ML: 10 INJECTION, SOLUTION EPIDURAL; INFILTRATION; INTRACAUDAL; PERINEURAL at 09:33

## 2024-06-14 ASSESSMENT — COLUMBIA-SUICIDE SEVERITY RATING SCALE - C-SSRS
6. HAVE YOU EVER DONE ANYTHING, STARTED TO DO ANYTHING, OR PREPARED TO DO ANYTHING TO END YOUR LIFE?: YES
6. HAVE YOU EVER DONE ANYTHING, STARTED TO DO ANYTHING, OR PREPARED TO DO ANYTHING TO END YOUR LIFE?: NO
2. HAVE YOU ACTUALLY HAD ANY THOUGHTS OF KILLING YOURSELF?: YES
1. IN THE PAST MONTH, HAVE YOU WISHED YOU WERE DEAD OR WISHED YOU COULD GO TO SLEEP AND NOT WAKE UP?: YES
5. HAVE YOU STARTED TO WORK OUT OR WORKED OUT THE DETAILS OF HOW TO KILL YOURSELF? DO YOU INTEND TO CARRY OUT THIS PLAN?: NO
4. HAVE YOU HAD THESE THOUGHTS AND HAD SOME INTENTION OF ACTING ON THEM?: NO

## 2024-06-14 ASSESSMENT — PATIENT HEALTH QUESTIONNAIRE - PHQ9: SUM OF ALL RESPONSES TO PHQ QUESTIONS 1-9: 1

## 2024-06-14 ASSESSMENT — PAIN SCALES - GENERAL: PAINLEVEL: NO PAIN (0)

## 2024-06-14 NOTE — PROGRESS NOTES
[facility]    Peer  met with Ileana YUDI Thorne in the Recovery Clinic to introduce herself, detail services provided and discuss current status of recovery. Pt appeared alert, oriented and open to feedback during our discussion.     Pt arrives for visit with provider for suboxone.  PRC sees patient today under provider diagnosis of opioid substance disorder, severe, dependence  (H)        The peer  met with the patient to discuss her progress in recovery, and the patient reported that her recovery is going well. The patient expressed a desire to meet with the peer during her upcoming visit with the provider. The peer suggested the idea of working out together on-site as part of her support plan.        The patient appears to be actively engaged in her recovery process.  The patient s willingness to meet with the peer during provider visits indicates a strong commitment to her recovery journey.  The suggestion of working out together on-site can provide additional support and motivation for the patient.      Schedule a meeting between the patient and the peer during the next provider visit.  Coordinate a time for on-site workouts that are convenient for both the patient and the peer.  Continue to monitor the patient s progress and adjust support strategies as needed.   Wellness       PRC provided business card to pt welcoming contact for recovery based support and resources. PRC and pt agree to speak again during an upcoming RC visit.           Service Type:     Individual     Session Start Time:    9:45 am                     Session End Time:     10:00 am    Session Length:   15 mins          Patient Goal:   To utilize suboxone assisted treatment for sobriety and long term recovery.     Goal Progress:   Ongoing.    Key Risk Factors to Recovery:   [facility] encourages being aware of risk factors that can lead to re-use which include avoiding isolation, avoiding  triggers and managing cravings in a healthy manner. being open and willing to acceptance and change on a daily basis.  PRC encourages pt to establish a sober network calling tree to reach out to when needed.  Continue to practice honesty with ourselves and trusted support person(s).   PRC encourages regular attendance at recovery based meetings as well as finding a sponsor for mentoring and accountability.   PRC encourages consideration of other services such as counseling for mental health issues which can correlate with our substance use.      Support Needs:   Ongoing care, support and resources for opioid substance use disorder.     Follow up:   PRC has provided pt with her contact information for support and resource needs.    PRC and pt agree to meet during an upcoming  visit.       [facility]  [address], [address]   [place], [place], [address]  Clinic Phone: [phone]  Clinic Fax: [phone]  Peer  phone: [phone]    Open Monday - Friday  9:00am-4:00pm  Walk in hours: 9am-3pm      Brandie Boo  June 14, 2024  2:27 PM    JOIE Alexandre provides clinical oversite and supervision of care.

## 2024-06-14 NOTE — PROGRESS NOTES
Infusion Nursing Note:  Ileana BAGLEY Lalito presents today for sublocade 300 mg first dose.    Patient seen by provider today: Yes: Going to  after this injection   present during visit today: Not Applicable.    Note: N/A.      Intravenous Access:  No Intravenous access/labs at this visit.    Treatment Conditions:  Denies relapse.  Taking suboxone as directed.      Post Infusion Assessment:  Patient tolerated injection without incident.  Given in LLQ after 2ml subcutaneous xylocaine administered for local anesthesia.       Discharge Plan:   Patient discharged in stable condition accompanied by: self.  Reviewed and provided with printed info and wallet card for sublocade.  Departure Mode: Ambulatory.  RTC 7/12/24.      Lorie Wall
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
## 2024-06-14 NOTE — PROGRESS NOTES
[facility]  June 14, 2024      Behavioral Health Clinician Progress Note    Patient Name: Ileana Thorne           Service Type:  Individual      Service Location:   Face to Face in Clinic     Session Start Time: 11:00am  Session End Time: 11:20am      Session Length: 16 - 37      Attendees: Patient     Service Modality:  In-person    Visit Activities (Refresh list every visit): [facility] Only    Diagnostic Assessment Date: Not completed yet  Treatment Plan Review Date: Not completed yet  See Flowsheets for today's PHQ-9 and YASH-7 results  Previous PHQ-9:       5/23/2024    11:00 AM 6/7/2024    10:00 AM 6/14/2024    10:00 AM   PHQ-9 SCORE   PHQ-9 Total Score 24 20 1     Previous YASH-7:       2/27/2024    10:57 AM 3/15/2024     1:50 PM 5/7/2024     3:48 PM   YASH-7 SCORE   Total Score 14 (moderate anxiety) 15 (severe anxiety) 21 (severe anxiety)   Total Score 14 15 21       TERESA LEVEL:       No data to display                DATA  Extended Session (60+ minutes): No  Interactive Complexity: No  Crisis: No  [facility] Patient: No    Treatment Objective(s) Addressed in This Session:  Target Behavior(s):  mental health and recovery    Depressed Mood: Increase interest, engagement, and pleasure in doing things  Decrease frequency and intensity of feeling down, depressed, hopeless  Identify negative self-talk and behaviors: challenge core beliefs, myths, and actions  Improve concentration, focus, and mindfulness in daily activities   Decrease thoughts that you'd be better off dead or of suicide / self-harm  Anxiety: will experience a reduction in anxiety and will develop more effective coping skills to manage anxiety symptoms  Alcohol / Substance Use: will discuss/consider potential need for formal substance use evaluation, treatment and/or support  continue to make healthy choices regarding substance use and engage in activities / supportive services that promote sobriety    Current Stressors /  Issues:  11:am-to 11:20am    Suicide assessment and anxity and PTSD-crying and trauma and why me-We completed suicide assessment as the patient was a yellow she scored elevated on the [place]-she has suicidal thoughts-no current plan and no intent to kill self-she stated that she has history of suicidal thoughts but more recently she has not had these thoughts as often-she was given the 988 number and she agreed to call if she was having increased emotional crisis-she talked about having traumatic past and she stated (why do I have to do anything about healing) as we talked about her need to face her emotions and trauma while also feeling strong and grounded in the present self who is striving to live a better life-she became emotional as she talked about why-then she was able to agree that it was not her fault what happen to her but that it is her responsibility to heal from the past-she talked about having symptoms of anxiety and we had discussion of the plan to notice the negative thoughts and then to let the thoughts no that they do not have permission to be on her mind and then to put her mind on the search for what her God tells her she is to be intimately engaged in the exploring on the internet how God defines her to grow the seed in her heart towards accepting a more positive altered definition of the self.       Progress on Treatment Objective(s) / Homework:  No improvement - PREPARATION (Decided to change - considering how); Intervened by negotiating a change plan and determining options / strategies for behavior change, identifying triggers, exploring social supports, and working towards setting a date to begin behavior change    Motivational Interviewing    MI Intervention: Expressed Empathy/Understanding, Supported Autonomy, Collaboration, Evocation, Permission to raise concern or advise, Open-ended questions, Reflections: simple and complex, and Change talk (evoked)     Change Talk Expressed by the  Patient: Desire to change Reasons to change Need to change Committment to change Activation Taking steps    Provider Response to Change Talk: E - Evoked more info from patient about behavior change, A - Affirmed patient's thoughts, decisions, or attempts at behavior change, R - Reflected patient's change talk, and S - Summarized patient's change talk statements    Assessments completed prior to visit:  The following assessments were completed by patient for this visit:    Care Plan review completed: No    Medication Review:  No changes to current psychiatric medication(s)    Medication Compliance:  NA    Changes in Health Issues:   None reported    Chemical Use Review:   Substance Use: Chemical use reviewed, no active concerns identified      Tobacco Use: Not reported    Assessment: Current Emotional / Mental Status (status of significant symptoms):  Risk status (Self / Other harm or suicidal ideation)  Patient has had a history of suicidal ideation: yes in the past  Patient denies current fears or concerns for personal safety.  Patient reports the following current or recent suicidal ideation or behaviors: with no plan and no intent.  Patient denies current or recent homicidal ideation or behaviors.  Patient denies current or recent self injurious behavior or ideation.  Patient denies other safety concerns.  A safety and risk management plan has not been developed at this time, however patient was encouraged to call [place] [phone] should there be a change in any of these risk factors.    Appearance:   Appropriate   Eye Contact:   Good   Psychomotor Behavior: Normal   Attitude:   Cooperative   Orientation:   All  Speech   Rate / Production: Normal/ Responsive Normal    Volume:  Normal   Mood:    Normal  Affect:    Appropriate   Thought Content:  Clear   Thought Form:  Coherent  Goal Directed  Logical   Insight:    Fair     Diagnoses:  1. Major depressive disorder, recurrent episode, moderate (H)    2.  Generalized anxiety disorder    3. Polydrug abuse (H)        Collateral Reports Completed:  Not Applicable    Plan: (Homework, other):  Patient was given information about behavioral services and encouraged to schedule a follow up appointment with the clinic [facility] as needed.  She was also given information about mental health symptoms and treatment options  and strategies to maintain sobriety.  CD Recommendations: Maintain Sobriety.       Neftali Angulo, [facility] CareSW

## 2024-06-17 RX ORDER — BUPRENORPHINE AND NALOXONE 8; 2 MG/1; MG/1
1-1.5 FILM, SOLUBLE BUCCAL; SUBLINGUAL DAILY PRN
Status: SHIPPED
Start: 2024-06-17 | End: 2024-08-21

## 2024-06-27 ENCOUNTER — HOSPITAL ENCOUNTER (EMERGENCY)
Facility: CLINIC | Age: 29
Discharge: HOME | End: 2024-06-27
Attending: EMERGENCY MEDICINE | Admitting: EMERGENCY MEDICINE
Payer: COMMERCIAL

## 2024-06-27 VITALS
WEIGHT: 224.5 LBS | OXYGEN SATURATION: 96 % | DIASTOLIC BLOOD PRESSURE: 87 MMHG | HEART RATE: 92 BPM | SYSTOLIC BLOOD PRESSURE: 128 MMHG | RESPIRATION RATE: 16 BRPM | TEMPERATURE: 97.7 F | BODY MASS INDEX: 37.36 KG/M2

## 2024-06-27 DIAGNOSIS — F41.9 ANXIETY: ICD-10-CM

## 2024-06-27 DIAGNOSIS — R45.851 SUICIDAL IDEATION: ICD-10-CM

## 2024-06-27 LAB
AMPHETAMINES UR QL SCN: NORMAL
BARBITURATES UR QL SCN: NORMAL
BENZODIAZ UR QL SCN: NORMAL
BZE UR QL SCN: NORMAL
CANNABINOIDS UR QL SCN: NORMAL
FENTANYL UR QL: NORMAL
HCG UR QL: NEGATIVE
OPIATES UR QL SCN: NORMAL
PCP QUAL URINE (ROCHE): NORMAL

## 2024-06-27 PROCEDURE — 250N000013 HC RX MED GY IP 250 OP 250 PS 637: Performed by: EMERGENCY MEDICINE

## 2024-06-27 PROCEDURE — 99285 EMERGENCY DEPT VISIT HI MDM: CPT | Performed by: EMERGENCY MEDICINE

## 2024-06-27 PROCEDURE — 99284 EMERGENCY DEPT VISIT MOD MDM: CPT | Performed by: EMERGENCY MEDICINE

## 2024-06-27 PROCEDURE — 81025 URINE PREGNANCY TEST: CPT | Performed by: EMERGENCY MEDICINE

## 2024-06-27 PROCEDURE — 80307 DRUG TEST PRSMV CHEM ANLYZR: CPT | Performed by: EMERGENCY MEDICINE

## 2024-06-27 PROCEDURE — 36415 COLL VENOUS BLD VENIPUNCTURE: CPT | Performed by: EMERGENCY MEDICINE

## 2024-06-27 RX ORDER — CLONIDINE HYDROCHLORIDE 0.1 MG/1
0.1 TABLET ORAL 3 TIMES DAILY PRN
Status: DISCONTINUED | OUTPATIENT
Start: 2024-06-27 | End: 2024-06-27 | Stop reason: HOSPADM

## 2024-06-27 RX ORDER — PREGABALIN 100 MG/1
100 CAPSULE ORAL ONCE
Status: COMPLETED | OUTPATIENT
Start: 2024-06-27 | End: 2024-06-27

## 2024-06-27 RX ORDER — CALCIUM CARBONATE 500 MG/1
500 TABLET, CHEWABLE ORAL ONCE
Status: COMPLETED | OUTPATIENT
Start: 2024-06-27 | End: 2024-06-27

## 2024-06-27 RX ORDER — NICOTINE 21 MG/24HR
1 PATCH, TRANSDERMAL 24 HOURS TRANSDERMAL ONCE
Status: DISCONTINUED | OUTPATIENT
Start: 2024-06-27 | End: 2024-06-27 | Stop reason: HOSPADM

## 2024-06-27 RX ORDER — HYDROXYZINE HYDROCHLORIDE 25 MG/1
25 TABLET, FILM COATED ORAL EVERY 6 HOURS PRN
Qty: 120 TABLET | Refills: 0 | Status: SHIPPED | OUTPATIENT
Start: 2024-06-27 | End: 2024-07-27

## 2024-06-27 RX ADMIN — NICOTINE 1 PATCH: 21 PATCH, EXTENDED RELEASE TRANSDERMAL at 16:01

## 2024-06-27 RX ADMIN — PREGABALIN 100 MG: 100 CAPSULE ORAL at 16:55

## 2024-06-27 RX ADMIN — CLONIDINE HYDROCHLORIDE 0.1 MG: 0.1 TABLET ORAL at 14:09

## 2024-06-27 RX ADMIN — CALCIUM CARBONATE (ANTACID) CHEW TAB 500 MG 500 MG: 500 CHEW TAB at 14:09

## 2024-06-27 ASSESSMENT — ACTIVITIES OF DAILY LIVING (ADL)
ADLS_ACUITY_SCORE: 37
ADLS_ACUITY_SCORE: 37
ADLS_ACUITY_SCORE: 35
ADLS_ACUITY_SCORE: 35
ADLS_ACUITY_SCORE: 37

## 2024-06-27 NOTE — DISCHARGE INSTRUCTIONS
Scheduled Appointments:    Type: Telepsychiatry  Date: Tuesday, 7/2/2024  Time: 9:00 am - 10:00 am  Provider: Katherin Dawkins MS  CNP  Location: [facility], [address] [place] [address], [address], [place] [address]  Phone: [phone]  Patient Instructions:  Please fill New Patient Form by using following link or call us to request link to be sent to you. [email or web address] All forms need to be completed ATLEAST 96 HOURS prior to appointment. (2) Please call us on [phone] - 96 HOURS prior to your scheduled appointment to confirm that you are able to attend. We will provide you information about how to log into video call software when you call.    Another Resource to find Mental Health Providers:  [email or web address]    We scheduled a Virtual Psychotherapy appointment with [facility] on July 28, 2024 at 3:00 PM    They will be reaching out to you via e-mail or phone in the coming days.  They can be reached at [phone].

## 2024-06-27 NOTE — CONSULTS
"Diagnostic Evaluation Consultation  Crisis Assessment    Patient Name: Ileana Thorne  Age:  28 year old  Legal Sex: female  Gender Identity: female  Pronouns:   Race: White  Ethnicity: Not  or   Language: English      Patient was assessed: In person   Crisis Assessment Start Date: 06/27/24  Crisis Assessment Start Time: 1715  Crisis Assessment Stop Time: 1745  Patient location: [facility] EMERGENCY DEPARTMENT                             [facility]-    Referral Data and Chief Complaint  Ileana Thorne presents to the ED  . Patient is presenting to the ED for the following concerns: Anxiety, Suicidal ideation.   Factors that make the mental health crisis life threatening or complex are:  Pt presents to Ed for worsening anxiety. Pt reports she was discontinued on her Klonopin approximately 2-3 months ago and anxiety has been extremely difficult to manage since. Unclear, but pt may have been abusing her klonopin prescription. Pt seeks out ED for medications or further support. Pt only works with PCP currently. Upon arrival, pt made no comments about SI. Once pt was informed she could receive hydroxyzine rather than benzo's she states that she started to feel suicidal with a plan to overdose on heroin. Pt denies having access to heroine. Pt denies having intent to harm herself. Pt denies HI, NSSIB, psychosis. Reports being sober \"for awhile now\". Pt is interested in starting therapy and psychiatry. She feels safe to discharge home with services..      Informed Consent and Assessment Methods  Explained the crisis assessment process, including applicable information disclosures and limits to confidentiality, assessed understanding of the process, and obtained consent to proceed with the assessment.  Assessment methods included conducting a formal interview with patient, review of medical records, collaboration with medical staff, and obtaining relevant collateral information from family and community " providers when available.  : done     Patient response to interventions: acceptance expressed  Coping skills were attempted to reduce the crisis:  seeking out ED     History of the Crisis   Hx of depression, anxiety, amphetamine abuse, and opioid abuse. Pt works with PCP at this time. Reports hx of engaging in therapy with some success. Pt works full time, overnight shifts.    Brief Psychosocial History  Family:  Single, Children no  Support System:  Parent(s)  Employment Status:  employed full-time  Source of Income:  salary/wages  Financial Environmental Concerns:  none  Current Hobbies:     Barriers in Personal Life:  mental health concerns    Significant Clinical History  Current Anxiety Symptoms:  anxious, excessive worry  Current Depression/Trauma:  sadness, thoughts of death/suicide  Current Somatic Symptoms:     Current Psychosis/Thought Disturbance:     Current Eating Symptoms:     Chemical Use History:  Alcohol: None  Benzodiazepines: None (hx of abuse)  Opiates: None (hx of abuse)  Cocaine: None  Marijuana: None  Other Use: None   Past diagnosis:  Anxiety Disorder, Depression, Substance Use Disorder, PTSD  Family history:  No known history of mental health or chemical health concerns  Past treatment:  Individual therapy, Primary Care  Details of most recent treatment:  PCP  Other relevant history:          Collateral Information  Is there collateral information: No         Risk Assessment  [place] Suicide Severity Rating Scale Full Clinical Version:  Suicidal Ideation  Q1 Wish to be Dead (Lifetime): Yes  Q2 Non-Specific Active Suicidal Thoughts (Lifetime): Yes  3. Active Suicidal Ideation with any Methods (Not Plan) Without Intent to Act (Lifetime): Yes  Q4 Active Suicidal Ideation with Some Intent to Act, Without Specific Plan (Lifetime): No  Q5 Active Suicidal Ideation with Specific Plan and Intent (Lifetime): No  Q6 Suicide Behavior (Lifetime): no     Suicidal Behavior (Lifetime)  Actual Attempt  "(Lifetime): No  Has subject engaged in non-suicidal self-injurious behavior? (Lifetime): No  Interrupted Attempts (Lifetime): No  Aborted or Self-Interrupted Attempt (Lifetime): No  Preparatory Acts or Behavior (Lifetime): No    [place] Suicide Severity Rating Scale Recent:   Suicidal Ideation (Recent)  Q1 Wished to be Dead (Past Month): yes  Q2 Suicidal Thoughts (Past Month): yes  Q3 Suicidal Thought Method: yes  Q4 Suicidal Intent without Specific Plan: no  Q5 Suicide Intent with Specific Plan: no  If yes to Q6, within past 3 months?: no  Level of Risk per Screen: moderate risk  Intensity of Ideation (Recent)  Most Severe Ideation Rating (Past 1 Month):  (\"I don't know probably 2\")  Frequency (Past 1 Month): Less than once a week  Duration (Past 1 Month): Less than 1 hour/some of the time  Controllability (Past 1 Month): Does not attempt to control thoughts  Deterrents (Past 1 Month): Deterrents definitely stopped you from attempting suicide  Reasons for Ideation (Past 1 Month): Equally to get attention, revenge, or a reaction from others and to end/stop the pain  Suicidal Behavior (Recent)  Actual Attempt (Past 3 Months): No  Has subject engaged in non-suicidal self-injurious behavior? (Past 3 Months): No  Interrupted Attempts (Past 3 Months): No  Aborted or Self-Interrupted Attempt (Past 3 Months): No  Preparatory Acts or Behavior (Past 3 Months): No    Environmental or Psychosocial Events: other life stressors  Protective Factors: Protective Factors: strong bond to family unit, community support, or employment, lives in a responsibly safe and stable environment, help seeking, supportive ongoing medical and mental health care relationships    Does the patient have thoughts of harming others? Feels Like Hurting Others: no  Previous Attempt to Hurt Others: no  Is the patient engaging in sexually inappropriate behavior?: no    Is the patient engaging in sexually inappropriate behavior?  no        Mental Status " Exam   Affect: Appropriate  Appearance: Disheveled  Attention Span/Concentration: Attentive  Eye Contact: Engaged    Fund of Knowledge: Appropriate   Language /Speech Content: Fluent  Language /Speech Volume: Normal  Language /Speech Rate/Productions: Normal  Recent Memory: Intact  Remote Memory: Intact  Mood: Anxious, Sad, Irritable  Orientation to Person: Yes   Orientation to Place: Yes  Orientation to Time of Day: Yes  Orientation to Date: Yes     Situation (Do they understand why they are here?): Yes  Psychomotor Behavior: Underactive  Thought Content: Clear  Thought Form: Intact       Medication  Psychotropic medications:   Medication Orders - Psychiatric (From admission, onward)      Start     Dose/Rate Route Frequency Ordered Stop    06/27/24 1600  nicotine (NICODERM CQ) 21 MG/24HR 24 hr patch 1 patch         1 patch  over 24 Hours Transdermal ONCE 06/27/24 1556               Current Care Team  Patient Care Team:  Dorota Marquis APRN CNP as PCP - General (Nurse Practitioner Primary Care)  Dorota Marquis APRN CNP as Assigned PCP  No Ref-Primary, Physician  Linnea Garza MD as Assigned OBGYN Provider  Vincent Marte MD as Resident (Student in organized health care education/training program)  Jaciel Gamez MD as Assigned Endocrinology Provider  Socorro Oconnor APRN CNP as Assigned Behavioral Health Provider    Diagnosis  Patient Active Problem List   Diagnosis Code    Essential hypertension I10    Generalized anxiety disorder F41.1    Methamphetamine addiction (H) F15.20    Tobacco dependence syndrome F17.200    History of pre-eclampsia in prior pregnancy, currently pregnant in second trimester O09.292    Mixed anxiety depressive disorder F41.8    Insomnia G47.00    Opioid use disorder, severe, dependence (H) F11.20    Depression with suicidal ideation F32.A, R45.851    Night terrors F51.4    Anxiety F41.9    History of pulmonary embolism Z86.711    Cervicalgia M54.2    Other chronic pain  G89.29    Methamphetamine abuse in remission (H) F15.11    PTSD (post-traumatic stress disorder) F43.10    Class 2 severe obesity due to excess calories with serious comorbidity in adult (H) E66.01    Major depression, recurrent (H) F33.9       Primary Problem This Admission    Generalized anxiety disorder F41.1    Clinical Summary and Substantiation of Recommendations   Pt presents to ED for concerns of worsening anxiety. See initial assessment sections for further detail regarding pt presentation and concerns. Pt feels she can discharge home safely with referral for psychiatry and therapy. Referrals completed.                          Patient coping skills attempted to reduce the crisis:  seeking out ED    Disposition  Recommended disposition: Individual Therapy, Medication Management        Reviewed case and recommendations with attending provider. Attending Name: Dr. Feldman       Attending concurs with disposition: yes       Patient and/or validated legal guardian concurs with disposition:   yes       Final disposition:  discharge    Legal status on admission: Voluntary/Patient has signed consent for treatment    Assessment Details   Total duration spent with the patient: 30 min     CPT code(s) utilized: 82113 - Psychotherapy for Crisis - 60 (30-74*) min    ADAN Wright, Psychotherapist  DEC - Triage & Transition Services  Callback: [phone]

## 2024-06-27 NOTE — ED TRIAGE NOTES
Triage Assessment (Adult)       Row Name 06/27/24 1239 06/27/24 1238       Triage Assessment    Airway WDL WDL WDL       Respiratory WDL    Respiratory WDL WDL WDL       Skin Circulation/Temperature WDL    Skin Circulation/Temperature WDL WDL WDL       Cardiac WDL    Cardiac WDL WDL WDL       Peripheral/Neurovascular WDL    Peripheral Neurovascular WDL WDL WDL       Cognitive/Neuro/Behavioral WDL    Cognitive/Neuro/Behavioral WDL WDL;X mood/behavior;X    Mood/Behavior -- sad       [place] Coma Scale    Best Eye Response 4-->(E4) spontaneous --    Best Motor Response 6-->(M6) obeys commands --    Best Verbal Response 5-->(V5) oriented --    [place] Coma Scale Score 15 --

## 2024-06-27 NOTE — ED PROVIDER NOTES
"I took signout on the patient from Dr. Scott.  This is a 28F with PMH of fibromyalgia, depression, anxiety, and KAYKAY. Presents to ED requesting benzodiazepines.  There were concerns for possible secondary gain from the initial provider.  Stating she has thoughts about overdosing, less concrete plan.    Awaiting DEC evaluation and final  recs.    Patient was seen by DEC evaluator and outpatient management was recommended.  They set up an intake appointment tomorrow, patient will be called at home and have maximized outpatient resources available.    When it came time to discharge patient she requested to speak again. She was not interested in Atarax prescription that was offered stating the medication does not work for her. She was asking about benzodiazepines and was again told that we would not be prescribing these for her.  She became upset about this, yelling at us for \"wasting her entire day.\"  Attempted to empathize with patient's frustrations and explained resources we were able to provide for her and expedited timeline. She stated that \"this is why people hurt themselves because nobody believes them.  \"She then grabbed her belongings and stormed to the front door stating that she wants to leave.  I stopped her and asked if she was going to hurt herself and she yelled \"no\". Patient has been cleared by mental health and is not endorsing any intent to harm herself. Not holdable. Allowed to discharge.            Tonny Feldman MD  06/28/24 0012    "

## 2024-06-27 NOTE — ED PROVIDER NOTES
[facility] EMERGENCY DEPARTMENT ([facility])    6/27/24      ED PROVIDER NOTE   Hallway I    History     Chief Complaint   Patient presents with    Suicidal     Plan to overdose on heroine, no access.       The history is provided by the patient and medical records.     Ileana Thorne is a 28 year old female who reports a history of depression and anxiety. She also has a past history of opioid use disorder and is currently maintained on injectable treatment MAT.  She states that she has been prescribed clonidine; desipramine and Lyrica are her current other medications.  She is here today because she feels very anxious and feels like she needs something for her anxiety and that clonidine is not helping. She last took it this morning, she seems to be quite fixated on needing benzodiazepines but does seem to understand that because she is in recovery from abuse of drugs we decided that that was not a safe and appropriate medicine for her to use for anxiety.  She reported suicidal thoughts that been going on for several weeks. She has endorsed a possible plan to overdose on heroin but does not currently have access to that.  She is tearful and upset. She has been working with a primary care doctor but states that she does not currently have a therapist or psychiatrist.      This part of the document was transcribed by Wendi Mcgraw, Medical Scribe.      Past Medical History  Past Medical History:   Diagnosis Date    Anxiety     Arthritis     Depressive disorder     Essential hypertension 02/25/2015    Marginal cord insertion 10/11/2016    q3 wk growth US      PTSD (post-traumatic stress disorder)     Pulmonary embolism (H) 2021    birth control pills and tobacco; s/p six months anticoagulation    Substance abuse (H) 02/13/2014    Uncomplicated asthma      Past Surgical History:   Procedure Laterality Date    APPENDECTOMY      open    DILATION AND CURETTAGE SUCTION N/A 12/11/2023    Procedure: DILATION AND  CURETTAGE, UTERUS, USING SUCTION;  Surgeon: Deepti Gonzalez MD;  Location: [facility] OR    IMPLANT HORMONE  12/11/2023    Procedure: Implant hormone, Nexplanon;  Surgeon: Deepti Gonzalez MD;  Location: [facility] OR    IP DILATION AND CURETTAGE PROCEDURE      TONSILLECTOMY & ADENOIDECTOMY       hydrOXYzine HCl (ATARAX) 25 MG tablet  ARIPiprazole (ABILIFY) 10 MG tablet  bisacodyl (DULCOLAX) 10 MG suppository  bisacodyl (DULCOLAX) 5 MG EC tablet  buprenorphine HCl-naloxone HCl (SUBOXONE) 8-2 MG per film  cloNIDine (CATAPRES) 0.1 MG tablet  cyclobenzaprine (FLEXERIL) 10 MG tablet  desipramine (NORPRAMIN) 150 MG tablet  desipramine (NORPRAMIN) 25 MG tablet  ibuprofen (ADVIL/MOTRIN) 600 MG tablet  naloxegol (MOVANTIK) 25 MG TABS tablet  naloxone (NARCAN) 4 MG/0.1ML nasal spray  polyethylene glycol (MIRALAX) 17 GM/Dose powder  pregabalin (LYRICA) 100 MG capsule  Ulipristal Acetate (MARCELA) 30 MG tablet  Ulipristal Acetate (MARCELA) 30 MG tablet      Allergies   Allergen Reactions    Seasonal Allergies Other (See Comments) and Itching     Runny nose, itchy     Family History  No family history on file.  Social History   Social History     Tobacco Use    Smoking status: Some Days     Current packs/day: 0.50     Types: Cigarettes, Vaping Device    Smokeless tobacco: Never   Vaping Use    Vaping status: Former    Substances: Nicotine, Flavoring    Devices: Disposable   Substance Use Topics    Alcohol use: No    Drug use: Not Currently     Types: Fentanyl     Comment: heroin last used about 9/13/16, started taking Methadone at that time, last took Methadone this morning (3/20)      A medically appropriate review of systems was performed with pertinent positives and negatives noted in the HPI, and all other systems negative.    Physical Exam   BP: 128/87  Pulse: 92  Temp: 97.7  F (36.5  C)  Resp: 16  Weight: 101.8 kg (224 lb 8 oz)  SpO2: 96 %    Physical Exam  Gen:A&Ox3, tearful and upset  HEENT:PERRL, no facial tenderness or  wounds, head atraumatic, oropharynx clear, mucous membranes moist, TMs clear bilaterally  Neck:no bony tenderness or step offs, no JVD, trachea midline  Back: no CVA tenderness, no midline bony tenderness  CV:RRR without murmurs  PULM:Clear to auscultation bilaterally  Abd:soft, nontender, nondistended. Bowel sounds present and normal  : no wounds or infection  UE:No traumatic injuries, skin normal  LE:no traumatic injuries, skin normal  Neuro:CN II-XII intact, strength 5/5 throughout  Skin: no rashes or ecchymoses     ED Course, Procedures, & Data      Procedures     Results for orders placed or performed during the hospital encounter of 06/27/24   Urine Drug Screen Panel     Status: Normal   Result Value Ref Range    Amphetamines Urine Screen Negative Screen Negative    Barbituates Urine Screen Negative Screen Negative    Benzodiazepine Urine Screen Negative Screen Negative    Cannabinoids Urine Screen Negative Screen Negative    Cocaine Urine Screen Negative Screen Negative    Fentanyl Qual Urine Screen Negative Screen Negative    Opiates Urine Screen Negative Screen Negative    PCP Urine Screen Negative Screen Negative   HCG qualitative urine     Status: Normal   Result Value Ref Range    hCG Urine Qualitative Negative Negative   Urine Drug Screen     Status: Normal    Narrative    The following orders were created for panel order Urine Drug Screen.  Procedure                               Abnormality         Status                     ---------                               -----------         ------                     Urine Drug Screen Panel[[id number]]      Normal              Final result                 Please view results for these tests on the individual orders.     Medications   calcium carbonate (TUMS) chewable tablet 500 mg (500 mg Oral $Given 6/27/24 7500)   pregabalin (LYRICA) capsule 100 mg (100 mg Oral $Given 6/27/24 0136)     Labs Ordered and Resulted from Time of ED Arrival to Time of ED  Departure   URINE DRUG SCREEN PANEL - Normal       Result Value    Amphetamines Urine Screen Negative      Barbituates Urine Screen Negative      Benzodiazepine Urine Screen Negative      Cannabinoids Urine Screen Negative      Cocaine Urine Screen Negative      Fentanyl Qual Urine Screen Negative      Opiates Urine Screen Negative      PCP Urine Screen Negative     HCG QUALITATIVE URINE - Normal    hCG Urine Qualitative Negative       No orders to display          Critical care was not performed.     Medical Decision Making  The patient's presentation was of high complexity (a chronic illness severe exacerbation, progression, or side effect of treatment).    The patient's evaluation involved:  ordering and/or review of 2 test(s) in this encounter (see separate area of note for details)    The patient's management necessitated moderate risk (mental health evaluation).    Assessment & Plan    28-year-old female presenting with reported anxiety, tearfulness, and suicidal thoughts.  Placed on health officer hold. Offered additional clonidine PRN for anxiety. DEC assessment ordered.   Pregnancy test and urine drug screen ordered.    In addition I was able to [facility] where she presented for nausea and vomiting. Exam today is without abdominal tenderness, I was able to see that she had a reassuring urinalysis and laboratory testing done at that visit, and so I did not think additional workup was required today.    Signed out to oncoming physician pending DEC assessment.     This part of the document was transcribed by Wendi Mcgraw, Medical Scribe.      I have reviewed the nursing notes. I have reviewed the findings, diagnosis, plan and need for follow up with the patient.    Discharge Medication List as of 6/27/2024  7:03 PM        START taking these medications    Details   hydrOXYzine HCl (ATARAX) 25 MG tablet Take 1 tablet (25 mg) by mouth every 6 hours as needed for itching  or anxiety, Disp-120 tablet, R-0, Local Print             Final diagnoses:   Anxiety   Suicidal ideation       Asya Scott MD  [facility] EMERGENCY DEPARTMENT  6/27/2024        Asya Scott MD  07/07/24 7484

## 2024-06-28 ENCOUNTER — TELEPHONE (OUTPATIENT)
Dept: BEHAVIORAL HEALTH | Facility: CLINIC | Age: 29
End: 2024-06-28

## 2024-06-28 ENCOUNTER — VIRTUAL VISIT (OUTPATIENT)
Dept: BEHAVIORAL HEALTH | Facility: CLINIC | Age: 29
End: 2024-06-28
Payer: COMMERCIAL

## 2024-06-28 DIAGNOSIS — F41.8 MIXED ANXIETY DEPRESSIVE DISORDER: ICD-10-CM

## 2024-06-28 DIAGNOSIS — Z71.1 PERSON WITH FEARED COMPLAINT IN WHOM NO DIAGNOSIS WAS MADE: Primary | ICD-10-CM

## 2024-06-28 PROCEDURE — 99207 PR NO CHARGE LOS: CPT | Mod: 95 | Performed by: PSYCHOLOGIST

## 2024-06-28 ASSESSMENT — ANXIETY QUESTIONNAIRES
6. BECOMING EASILY ANNOYED OR IRRITABLE: NEARLY EVERY DAY
7. FEELING AFRAID AS IF SOMETHING AWFUL MIGHT HAPPEN: NEARLY EVERY DAY
7. FEELING AFRAID AS IF SOMETHING AWFUL MIGHT HAPPEN: NEARLY EVERY DAY
2. NOT BEING ABLE TO STOP OR CONTROL WORRYING: NEARLY EVERY DAY
8. IF YOU CHECKED OFF ANY PROBLEMS, HOW DIFFICULT HAVE THESE MADE IT FOR YOU TO DO YOUR WORK, TAKE CARE OF THINGS AT HOME, OR GET ALONG WITH OTHER PEOPLE?: EXTREMELY DIFFICULT
GAD7 TOTAL SCORE: 21
GAD7 TOTAL SCORE: 21
3. WORRYING TOO MUCH ABOUT DIFFERENT THINGS: NEARLY EVERY DAY
IF YOU CHECKED OFF ANY PROBLEMS ON THIS QUESTIONNAIRE, HOW DIFFICULT HAVE THESE PROBLEMS MADE IT FOR YOU TO DO YOUR WORK, TAKE CARE OF THINGS AT HOME, OR GET ALONG WITH OTHER PEOPLE: EXTREMELY DIFFICULT
1. FEELING NERVOUS, ANXIOUS, OR ON EDGE: NEARLY EVERY DAY
4. TROUBLE RELAXING: NEARLY EVERY DAY
5. BEING SO RESTLESS THAT IT IS HARD TO SIT STILL: NEARLY EVERY DAY
GAD7 TOTAL SCORE: 21

## 2024-06-28 ASSESSMENT — PATIENT HEALTH QUESTIONNAIRE - PHQ9
SUM OF ALL RESPONSES TO PHQ QUESTIONS 1-9: 21
10. IF YOU CHECKED OFF ANY PROBLEMS, HOW DIFFICULT HAVE THESE PROBLEMS MADE IT FOR YOU TO DO YOUR WORK, TAKE CARE OF THINGS AT HOME, OR GET ALONG WITH OTHER PEOPLE: EXTREMELY DIFFICULT
SUM OF ALL RESPONSES TO PHQ QUESTIONS 1-9: 21

## 2024-06-28 NOTE — ED NOTES
Patient got upset with discharge plan, claims that hydroxyzine does not work for her and that she would like to talk to MD. MD talked to patient and was told that she is not getting any benzo, patient got upset and started to yell at MD. Patient was escorted out by security.

## 2024-06-28 NOTE — PROGRESS NOTES
Transition Clinic - Progress Note    Patient  Name: Ileana Thorne, : 1995    Date:  2024    Ileana Thorne is a 28 year old identified female  who was referred to the Transition Clinic by ED staff for mental health follow up.  The patient states that she would like a low dose of her medication and was under the impression that this visit today was with a medication prescriber.   She is very frustrated with how her ED visit went and upset about the follow up that was scheduled.  She states she thought she had an appt with a psychiatrist.  Patient is ordering food at restaurant when speaking with writer.  Informed her that the TC could make a referral for a medication provider if she doesn't have one, which she states she doesn't.    Asked the patient about appt today w/ Neftali Angulo and she states she doesn't know who this provider is.       Writer offered information about the TC clinic and services.  Patient observably frustrated, states she can't deal with this and needs her medication.   Let her know a referral would be placed and someone will reach out to her later in the day. Ended video visit.        Susan Ruvalcaba Psy.D, LP

## 2024-06-28 NOTE — TELEPHONE ENCOUNTER
First attempt to reach patient regarding Transition Clinic Referral.  Spoke to patient regarding Transition Clinic referral.    Patient is scheduled with [facility] on 7/2/2024, called with the patient on the line and LVM with clinic to call patient back to verify appointment and appointment details.    Tara Rebollar  Transition Clinic Coordinator  06/28/24 4:02 PM         ----- Message from Susan Ruvalcaba sent at 6/28/2024  3:16 PM CDT -----  Regarding: med mgmt  Patient was scheduled with me for virtual visit, however she thought it was for medication management.  She left ED yesterday, states that she doesn't have a med provider, so if you could assist with referral to one in care connect and  she can see TC in the meantime ?  She was pretty upset / frustrated, just fyi.     Transition Clinic Next Level of Care Referral Request    MRN: [id number]  Patient Name:  Ileana Thorne  Phone:  [phone] (work)  E-mail Address: [email or web address]    Type of patient appointment needed: Medication management     Provider Specialties: Specialty Services Requested: Anxiety Disorder    Service Modality:  Service Modality: In-Person or Virtual    Clinician Gender Preference: both    Clinical Comments: see note above     Susan Ruvalcaba Psy.D, KING

## 2024-07-01 RX ORDER — ARIPIPRAZOLE 10 MG/1
10 TABLET ORAL AT BEDTIME
Qty: 90 TABLET | Refills: 0 | Status: SHIPPED | OUTPATIENT
Start: 2024-07-01 | End: 2024-08-21

## 2024-07-05 ENCOUNTER — MYC REFILL (OUTPATIENT)
Dept: FAMILY MEDICINE | Facility: CLINIC | Age: 29
End: 2024-07-05
Payer: COMMERCIAL

## 2024-07-05 ENCOUNTER — MYC REFILL (OUTPATIENT)
Dept: BEHAVIORAL HEALTH | Facility: CLINIC | Age: 29
End: 2024-07-05
Payer: COMMERCIAL

## 2024-07-05 DIAGNOSIS — F11.93 OPIOID WITHDRAWAL (H): ICD-10-CM

## 2024-07-05 DIAGNOSIS — M54.2 CERVICALGIA: ICD-10-CM

## 2024-07-05 RX ORDER — PREGABALIN 100 MG/1
100 CAPSULE ORAL 3 TIMES DAILY
Qty: 90 CAPSULE | Refills: 0 | Status: SHIPPED | OUTPATIENT
Start: 2024-07-05 | End: 2024-08-06

## 2024-07-05 RX ORDER — CYCLOBENZAPRINE HCL 10 MG
10 TABLET ORAL EVERY 8 HOURS PRN
Qty: 30 TABLET | Refills: 0 | Status: SHIPPED | OUTPATIENT
Start: 2024-07-05 | End: 2024-08-03

## 2024-07-05 NOTE — TELEPHONE ENCOUNTER
Date of Last Office Visit: 06/14/2024  Date of Next Office Visit:  None  No shows since last visit: No  More than 2 cancellations since last visit? No    []Medication refilled per  Medication Refill in Ambulatory Care  policy.  [x]Medication unable to be refilled by RN due to criteria not met as indicated below:    []Eligibility: has not had a provider visit within last 6 months   []Supervision: no future appointment; < 7 days before next appointment   []Compliance: no shows; cancellations; lapse in therapy   []Verification: order discrepancy; may need modification...   []90-day supply request   []Advanced refill request: > 7 days before refill date   []Controlled medication   [x]Medication not included in policy   []Review: new med; med adjusted ? 30 days; safety alert; requires lab monitoring...   []Scope of Practice: refill request processed by LPN/MA   []Other:      Medication(s) requested:     -    pregabalin (LYRICA) 100 MG capsule 90 capsule 0 6/7/2024 -- No   Sig - Route: Take 1 capsule (100 mg) by mouth 3 times daily - Oral     Date last ordered: 06/07/2024  Qty: 90  Refills: 0  Appropriate for refill? Provider to review:      Requested medication(s) verified as identical to current order? Yes    Any lapse in adherence to medication(s) greater than 5 days? Unknown     Action required? routing to provider for review.    Last visit treatment plan:   1. Opioid use disorder, severe, dependence (H)  - S/p #1 300 mg Sublocade injection. Pt taking 32 mg SL buprenorphine prior to initial injection. Discussed recommendation to take Suboxone 8 mg daily prn for opioid withdrawal symptoms or cravings, OK to take up to 12 mg prn. No refill needed today. Pt will follow up in 2 weeks.   - confirms access to narcan   - discuss psychosocial interventions to support recovery at follow up  - check hepatic panel at follow up for medication management while on Sublocade.   - Drugs of Abuse Screen Urine (POC CUPS) POCT  - HCG  qualitative urine; Future  - HCG qualitative urine     2. Therapeutic opioid-induced constipation (OIC)  Controlled. Continue Movantik 25 mg daily.   - naloxegol (MOVANTIK) 25 MG TABS tablet; Take 1 tablet (25 mg) by mouth every morning (before breakfast)  Dispense: 30 tablet; Refill: 1     3. Anxiety  4. Episode of recurrent major depressive disorder, unspecified depression episode severity (H24)  - pt reports she discontinued Abilify, and Norpramin. H/o suicidal ideation, denies active suicidal thoughts or plan, no intent to harm self. Separate evaluation completed today per [facility] Neftali Angulo LICSW. Please see separate encounter. Has follow up with psychiatry on 7/11/24. Refilled clonidine as below.   - cloNIDine (CATAPRES) 0.1 MG tablet; Take 1 tablet (0.1 mg) by mouth 3 times daily as needed (anxiety)  Dispense: 60 tablet; Refill: 0                Return in about 2 weeks (around 6/28/2024) for Follow up, with any available provider, in person.      Any medication(s) require lab monitoring? Unsure    Willa Samaniego RN on 7/5/2024 at 4:32 PM

## 2024-07-09 DIAGNOSIS — F11.20 OPIOID USE DISORDER, SEVERE, DEPENDENCE (H): ICD-10-CM

## 2024-07-17 ENCOUNTER — E-VISIT (OUTPATIENT)
Dept: URGENT CARE | Facility: CLINIC | Age: 29
End: 2024-07-17
Payer: COMMERCIAL

## 2024-07-17 DIAGNOSIS — R11.0 NAUSEA: Primary | ICD-10-CM

## 2024-07-17 DIAGNOSIS — R11.10 VOMITING, UNSPECIFIED VOMITING TYPE, UNSPECIFIED WHETHER NAUSEA PRESENT: Primary | ICD-10-CM

## 2024-07-17 PROCEDURE — 99207 PR NON-BILLABLE SERV PER CHARTING: CPT | Performed by: PHYSICIAN ASSISTANT

## 2024-07-17 PROCEDURE — 99207 PR NON-BILLABLE SERV PER CHARTING: CPT | Performed by: NURSE PRACTITIONER

## 2024-07-17 RX ORDER — ONDANSETRON 4 MG/1
4 TABLET, ORALLY DISINTEGRATING ORAL EVERY 8 HOURS PRN
Qty: 10 TABLET | Refills: 0 | Status: SHIPPED | OUTPATIENT
Start: 2024-07-17 | End: 2024-08-21

## 2024-07-17 NOTE — PATIENT INSTRUCTIONS
Dear Ileana Thorne,    We are sorry you are not feeling well. Based on the responses you provided, it is recommended that you be seen in-person. A visit with your primary care provider would be ideal for your concern. Please use [employer] to schedule a visit with a provider or call [phone] to schedule at any of our locations. However, I do understand that it can be difficult to get in to see your primary care provider. I sent in a prescription for zofran to get you by until you are able to be seen.     Thank you for trusting us with your care.      Luba Okeefe PA-C     Therapist List    [facility] ([facility]  Individual counseling  [address] [address]  Kenyan & English available  Fees: sliding scale, free  [phone]    [facility]  [address] [place] [address]  Fees: insurance  [phone]    Angélica Rangel (Psychologist)  [address] 1st floor [facility] [address] [phone]    [facility] (Kenyan speaking available)  [address], [place] [address]  [phone]   [email or web address]     [facility] in [place] (Kenyan speaking available)  [address], [place] [address]  [phone]    [facility] ([facility]   [address] [place], [place] [address] [phone]       [facility]   [address], [place], [place], [id number] [phone]     [facility] (Kenyan speaking available)  [address] [place] [address]  Phone: [phone]    [facility]  [address] [place] [address]  Phone: [phone]    [facility]: [facility] office at [phone].    [facility]  [address]  Phone: [phone]    [facility]  [address]  Intake line: [phone]    [facility]  [address]  Phone: [phone]    [facility]  [address]  Phone: [phone]    [facility]  [address]  [place], [place] [address]  Phone: [phone]    [facility]  [address]  [place], [place] [address]  Phone: [phone]    [facility]  [address]  [place], [place] [address]  Phone: [phone]    [facility]  [address] [place] [address]  English & Kenyan  available  [phone]    [facility]  [address] [facility] [address] [place] [address]  [phone]    Poonam Trotter   [address]  [place], [place]   [phone]     Mary Ann Espinoza  E-mail  [email or web address]  [facility] [phone]    Malay speaking virtual therapy services:  [email or web address]      Therapy for Black girls:  [email or web address]

## 2024-07-17 NOTE — PATIENT INSTRUCTIONS
Dear Ileana Thorne,    We are sorry you are not feeling well. Based on the responses you provided, it is recommended that you be seen in-person in urgent care so we can better evaluate your symptoms. Please click here to find the nearest urgent care location to you.   You will not be charged for this Visit. Thank you for trusting us with your care.    Santiago Guidry NP

## 2024-08-01 ENCOUNTER — MYC MEDICAL ADVICE (OUTPATIENT)
Dept: FAMILY MEDICINE | Facility: CLINIC | Age: 29
End: 2024-08-01
Payer: COMMERCIAL

## 2024-08-03 ENCOUNTER — MYC REFILL (OUTPATIENT)
Dept: FAMILY MEDICINE | Facility: CLINIC | Age: 29
End: 2024-08-03
Payer: COMMERCIAL

## 2024-08-03 ENCOUNTER — MYC REFILL (OUTPATIENT)
Dept: BEHAVIORAL HEALTH | Facility: CLINIC | Age: 29
End: 2024-08-03
Payer: COMMERCIAL

## 2024-08-03 ENCOUNTER — HEALTH MAINTENANCE LETTER (OUTPATIENT)
Age: 29
End: 2024-08-03

## 2024-08-03 DIAGNOSIS — F11.93 OPIOID WITHDRAWAL (H): ICD-10-CM

## 2024-08-03 DIAGNOSIS — M54.2 CERVICALGIA: ICD-10-CM

## 2024-08-05 RX ORDER — PREGABALIN 100 MG/1
100 CAPSULE ORAL 3 TIMES DAILY
Qty: 90 CAPSULE | Refills: 0 | OUTPATIENT
Start: 2024-08-05

## 2024-08-05 RX ORDER — CYCLOBENZAPRINE HCL 10 MG
10 TABLET ORAL EVERY 8 HOURS PRN
Qty: 30 TABLET | Refills: 0 | Status: SHIPPED | OUTPATIENT
Start: 2024-08-05 | End: 2024-08-21

## 2024-08-05 NOTE — TELEPHONE ENCOUNTER
Lyrica refill request. [facility] for [facility]. MyChart sent informing patient. Pharmacy notified.    Last [facility] appt: 6/17/24, 2-week follow up recommended.    Chantal Jean-Baptiste RN on 8/5/2024 at 11:15 AM

## 2024-08-06 ENCOUNTER — MYC MEDICAL ADVICE (OUTPATIENT)
Dept: FAMILY MEDICINE | Facility: CLINIC | Age: 29
End: 2024-08-06
Payer: COMMERCIAL

## 2024-08-06 DIAGNOSIS — F11.93 OPIOID WITHDRAWAL (H): ICD-10-CM

## 2024-08-06 RX ORDER — PREGABALIN 100 MG/1
100 CAPSULE ORAL 3 TIMES DAILY
Qty: 90 CAPSULE | Refills: 0 | Status: SHIPPED | OUTPATIENT
Start: 2024-08-06 | End: 2024-08-21

## 2024-08-21 ENCOUNTER — OFFICE VISIT (OUTPATIENT)
Dept: FAMILY MEDICINE | Facility: CLINIC | Age: 29
End: 2024-08-21
Payer: COMMERCIAL

## 2024-08-21 VITALS
HEART RATE: 85 BPM | OXYGEN SATURATION: 99 % | WEIGHT: 224.6 LBS | BODY MASS INDEX: 37.42 KG/M2 | HEIGHT: 65 IN | TEMPERATURE: 97.2 F | DIASTOLIC BLOOD PRESSURE: 80 MMHG | RESPIRATION RATE: 14 BRPM | SYSTOLIC BLOOD PRESSURE: 130 MMHG

## 2024-08-21 DIAGNOSIS — F41.1 GENERALIZED ANXIETY DISORDER: ICD-10-CM

## 2024-08-21 DIAGNOSIS — F19.10 POLYSUBSTANCE ABUSE (H): ICD-10-CM

## 2024-08-21 DIAGNOSIS — F11.93 OPIOID WITHDRAWAL (H): ICD-10-CM

## 2024-08-21 DIAGNOSIS — F33.1 MODERATE EPISODE OF RECURRENT MAJOR DEPRESSIVE DISORDER (H): ICD-10-CM

## 2024-08-21 DIAGNOSIS — E66.812 CLASS 2 SEVERE OBESITY DUE TO EXCESS CALORIES WITH SERIOUS COMORBIDITY AND BODY MASS INDEX (BMI) OF 37.0 TO 37.9 IN ADULT (H): ICD-10-CM

## 2024-08-21 DIAGNOSIS — F60.3 BORDERLINE PERSONALITY DISORDER (H): ICD-10-CM

## 2024-08-21 DIAGNOSIS — G89.29 CHRONIC BILATERAL THORACIC BACK PAIN: ICD-10-CM

## 2024-08-21 DIAGNOSIS — E66.01 CLASS 2 SEVERE OBESITY DUE TO EXCESS CALORIES WITH SERIOUS COMORBIDITY AND BODY MASS INDEX (BMI) OF 37.0 TO 37.9 IN ADULT (H): ICD-10-CM

## 2024-08-21 DIAGNOSIS — M54.6 CHRONIC BILATERAL THORACIC BACK PAIN: ICD-10-CM

## 2024-08-21 DIAGNOSIS — M54.2 CERVICALGIA: ICD-10-CM

## 2024-08-21 DIAGNOSIS — Z00.00 ROUTINE GENERAL MEDICAL EXAMINATION AT A HEALTH CARE FACILITY: Primary | ICD-10-CM

## 2024-08-21 PROCEDURE — 99395 PREV VISIT EST AGE 18-39: CPT

## 2024-08-21 PROCEDURE — 99214 OFFICE O/P EST MOD 30 MIN: CPT | Mod: 25

## 2024-08-21 PROCEDURE — 96127 BRIEF EMOTIONAL/BEHAV ASSMT: CPT

## 2024-08-21 RX ORDER — CYCLOBENZAPRINE HCL 10 MG
10 TABLET ORAL EVERY 8 HOURS PRN
Qty: 60 TABLET | Refills: 3 | Status: SHIPPED | OUTPATIENT
Start: 2024-08-21

## 2024-08-21 RX ORDER — PREGABALIN 100 MG/1
100 CAPSULE ORAL 3 TIMES DAILY
Qty: 90 CAPSULE | Refills: 2 | Status: SHIPPED | OUTPATIENT
Start: 2024-09-05

## 2024-08-21 SDOH — HEALTH STABILITY: PHYSICAL HEALTH: ON AVERAGE, HOW MANY DAYS PER WEEK DO YOU ENGAGE IN MODERATE TO STRENUOUS EXERCISE (LIKE A BRISK WALK)?: 4 DAYS

## 2024-08-21 SDOH — HEALTH STABILITY: PHYSICAL HEALTH: ON AVERAGE, HOW MANY MINUTES DO YOU ENGAGE IN EXERCISE AT THIS LEVEL?: 30 MIN

## 2024-08-21 ASSESSMENT — SOCIAL DETERMINANTS OF HEALTH (SDOH): HOW OFTEN DO YOU GET TOGETHER WITH FRIENDS OR RELATIVES?: NEVER

## 2024-08-21 ASSESSMENT — PATIENT HEALTH QUESTIONNAIRE - PHQ9: SUM OF ALL RESPONSES TO PHQ QUESTIONS 1-9: 9

## 2024-08-24 PROBLEM — F60.3 BORDERLINE PERSONALITY DISORDER (H): Status: ACTIVE | Noted: 2024-08-24

## 2024-08-24 NOTE — PATIENT INSTRUCTIONS
Patient Education   Preventive Care Advice   This is general advice given by our system to help you stay healthy. However, your care team may have specific advice just for you. Please talk to your care team about your preventive care needs.  Nutrition  Eat 5 or more servings of fruits and vegetables each day.  Try wheat bread, brown rice and whole grain pasta (instead of white bread, rice, and pasta).  Get enough calcium and vitamin D. Check the label on foods and aim for 100% of the RDA (recommended daily allowance).  Lifestyle  Exercise at least 150 minutes each week  (30 minutes a day, 5 days a week).  Do muscle strengthening activities 2 days a week. These help control your weight and prevent disease.  No smoking.  Wear sunscreen to prevent skin cancer.  Have a dental exam and cleaning every 6 months.  Yearly exams  See your health care team every year to talk about:  Any changes in your health.  Any medicines your care team has prescribed.  Preventive care, family planning, and ways to prevent chronic diseases.  Shots (vaccines)   HPV shots (up to age 26), if you've never had them before.  Hepatitis B shots (up to age 59), if you've never had them before.  COVID-19 shot: Get this shot when it's due.  Flu shot: Get a flu shot every year.  Tetanus shot: Get a tetanus shot every 10 years.  Pneumococcal, hepatitis A, and RSV shots: Ask your care team if you need these based on your risk.  Shingles shot (for age 50 and up)  General health tests  Diabetes screening:  Starting at age 35, Get screened for diabetes at least every 3 years.  If you are younger than age 35, ask your care team if you should be screened for diabetes.  Cholesterol test: At age 39, start having a cholesterol test every 5 years, or more often if advised.  Bone density scan (DEXA): At age 50, ask your care team if you should have this scan for osteoporosis (brittle bones).  Hepatitis C: Get tested at least once in your life.  STIs (sexually  transmitted infections)  Before age 24: Ask your care team if you should be screened for STIs.  After age 24: Get screened for STIs if you're at risk. You are at risk for STIs (including HIV) if:  You are sexually active with more than one person.  You don't use condoms every time.  You or a partner was diagnosed with a sexually transmitted infection.  If you are at risk for HIV, ask about PrEP medicine to prevent HIV.  Get tested for HIV at least once in your life, whether you are at risk for HIV or not.  Cancer screening tests  Cervical cancer screening: If you have a cervix, begin getting regular cervical cancer screening tests starting at age 21.  Breast cancer scan (mammogram): If you've ever had breasts, begin having regular mammograms starting at age 40. This is a scan to check for breast cancer.  Colon cancer screening: It is important to start screening for colon cancer at age 45.  Have a colonoscopy test every 10 years (or more often if you're at risk) Or, ask your provider about stool tests like a FIT test every year or Cologuard test every 3 years.  To learn more about your testing options, visit:   .  For help making a decision, visit:   https://bit.ly/pc44777.  Prostate cancer screening test: If you have a prostate, ask your care team if a prostate cancer screening test (PSA) at age 55 is right for you.  Lung cancer screening: If you are a current or former smoker ages 50 to 80, ask your care team if ongoing lung cancer screenings are right for you.  For informational purposes only. Not to replace the advice of your health care provider. Copyright   2023 [facility]. All rights reserved. Clinically reviewed by the [facility]. [employer] 689185 - REV 01/24.  Learning About Stress  What is stress?     Stress is your body's response to a hard situation. Your body can have a physical, emotional, or mental response. Stress is a fact of life for most people, and it  affects everyone differently. What causes stress for you may not be stressful for someone else.  A lot of things can cause stress. You may feel stress when you go on a job interview, take a test, or run a race. This kind of short-term stress is normal and even useful. It can help you if you need to work hard or react quickly. For example, stress can help you finish an important job on time.  Long-term stress is caused by ongoing stressful situations or events. Examples of long-term stress include long-term health problems, ongoing problems at work, or conflicts in your family. Long-term stress can harm your health.  How does stress affect your health?  When you are stressed, your body responds as though you are in danger. It makes hormones that speed up your heart, make you breathe faster, and give you a burst of energy. This is called the fight-or-flight stress response. If the stress is over quickly, your body goes back to normal and no harm is done.  But if stress happens too often or lasts too long, it can have bad effects. Long-term stress can make you more likely to get sick, and it can make symptoms of some diseases worse. If you tense up when you are stressed, you may develop neck, shoulder, or low back pain. Stress is linked to high blood pressure and heart disease.  Stress also harms your emotional health. It can make you pathak, tense, or depressed. Your relationships may suffer, and you may not do well at work or school.  What can you do to manage stress?  You can try these things to help manage stress:   Do something active. Exercise or activity can help reduce stress. Walking is a great way to get started. Even everyday activities such as housecleaning or yard work can help.  Try yoga or robert chi. These techniques combine exercise and meditation. You may need some training at first to learn them.  Do something you enjoy. For example, listen to music or go to a movie. Practice your hobby or do volunteer  "work.  Meditate. This can help you relax, because you are not worrying about what happened before or what may happen in the future.  Do guided imagery. Imagine yourself in any setting that helps you feel calm. You can use online videos, books, or a teacher to guide you.  Do breathing exercises. For example:  From a standing position, bend forward from the waist with your knees slightly bent. Let your arms dangle close to the floor.  Breathe in slowly and deeply as you return to a standing position. Roll up slowly and lift your head last.  Hold your breath for just a few seconds in the standing position.  Breathe out slowly and bend forward from the waist.  Let your feelings out. Talk, laugh, cry, and express anger when you need to. Talking with supportive friends or family, a counselor, or a domingo leader about your feelings is a healthy way to relieve stress. Avoid discussing your feelings with people who make you feel worse.  Write. It may help to write about things that are bothering you. This helps you find out how much stress you feel and what is causing it. When you know this, you can find better ways to cope.  What can you do to prevent stress?  You might try some of these things to help prevent stress:  Manage your time. This helps you find time to do the things you want and need to do.  Get enough sleep. Your body recovers from the stresses of the day while you are sleeping.  Get support. Your family, friends, and community can make a difference in how you experience stress.  Limit your news feed. Avoid or limit time on social media or news that may make you feel stressed.  Do something active. Exercise or activity can help reduce stress. Walking is a great way to get started.  Where can you learn more?  Go to https://www.healthwise.net/patiented  Enter N032 in the search box to learn more about \"Learning About Stress.\"  Current as of: October 24, 2023               Content Version: 14.0    2462-6317 " [employer].   Care instructions adapted under license by your healthcare professional. If you have questions about a medical condition or this instruction, always ask your healthcare professional. [employer] disclaims any warranty or liability for your use of this information.      Learning About Depression Screening  What is depression screening?  Depression screening is a way to see if you have depression symptoms. It may be done by a doctor or counselor. It's often part of a routine checkup. That's because your mental health is just as important as your physical health.  Depression is a mental health condition that affects how you feel, think, and act. You may:  Have less energy.  Lose interest in your daily activities.  Feel sad and grouchy for a long time.  Depression is very common. It affects people of all ages.  Many things can lead to depression. Some people become depressed after they have a stroke or find out they have a major illness like cancer or heart disease. The death of a loved one or a breakup may lead to depression. It can run in families. Most experts believe that a combination of inherited genes and stressful life events can cause it.  What happens during screening?  You may be asked to fill out a form about your depression symptoms. You and the doctor will discuss your answers. The doctor may ask you more questions to learn more about how you think, act, and feel.  What happens after screening?  If you have symptoms of depression, your doctor will talk to you about your options.  Doctors usually treat depression with medicines or counseling. Often, combining the two works best. Many people don't get help because they think that they'll get over the depression on their own. But people with depression may not get better unless they get treatment.  The cause of depression is not well understood. There may be many factors involved. But if you have depression, it's not  "your fault.  A serious symptom of depression is thinking about death or suicide. If you or someone you care about talks about this or about feeling hopeless, get help right away.  It's important to know that depression can be treated. Medicine, counseling, and self-care may help.  Where can you learn more?  Go to https://www.[employer]/patiented  Enter T185 in the search box to learn more about \"Learning About Depression Screening.\"  Current as of: June 24, 2023  Content Version: 14.1 2006-2024 [employer].   Care instructions adapted under license by your healthcare professional. If you have questions about a medical condition or this instruction, always ask your healthcare professional. [employer] disclaims any warranty or liability for your use of this information.    Substance Use Disorder: Care Instructions  Overview     You can improve your life and health by stopping your use of alcohol or drugs. When you don't drink or use drugs, you may feel and sleep better. You may get along better with your family, friends, and coworkers. There are medicines and programs that can help with substance use disorder.  How can you care for yourself at home?  Here are some ways to help you stay sober and prevent relapse.  If you have been given medicine to help keep you sober or reduce your cravings, be sure to take it exactly as prescribed.  Talk to your doctor about programs that can help you stop using drugs or drinking alcohol.  Do not keep alcohol or drugs in your home.  Plan ahead. Think about what you'll say if other people ask you to drink or use drugs. Try not to spend time with people who drink or use drugs.  Use the time and money spent on drinking or drugs to do something that's important to you.  Preventing a relapse  Have a plan to deal with relapse. Learn to recognize changes in your thinking that lead you to drink or use drugs. Get help before you start to drink or use drugs " again.  Try to stay away from situations, friends, or places that may lead you to drink or use drugs.  If you feel the need to drink alcohol or use drugs again, seek help right away. Call a trusted friend or family member. Some people get support from organizations such as Narcotics Anonymous or [employer] or from treatment facilities.  If you relapse, get help as soon as you can. Some people make a plan with another person that outlines what they want that person to do for them if they relapse. The plan usually includes how to handle the relapse and who to notify in case of relapse.  Don't give up. Remember that a relapse doesn't mean that you have failed. Use the experience to learn the triggers that lead you to drink or use drugs. Then quit again. Recovery is a lifelong process. Many people have several relapses before they are able to quit for good.  Follow-up care is a key part of your treatment and safety. Be sure to make and go to all appointments, and call your doctor if you are having problems. It's also a good idea to know your test results and keep a list of the medicines you take.  When should you call for help?   Call 341  anytime you think you may need emergency care. For example, call if you or someone else:    Has overdosed or has withdrawal signs. Be sure to tell the emergency workers that you are or someone else is using or trying to quit using drugs. Overdose or withdrawal signs may include:  Losing consciousness.  Seizure.  Seeing or hearing things that aren't there (hallucinations).     Is thinking or talking about suicide or harming others.   Where to get help 24 hours a day, 7 days a week   If you or someone you know talks about suicide, self-harm, a mental health crisis, a substance use crisis, or any other kind of emotional distress, get help right away. You can:    Call the Suicide and Crisis Lifeline at 771.     Call 2-222-977-TALK (1-515.947.3159).     Text HOME to 486385 to access  "the Crisis Text Line.   Consider saving these numbers in your phone.  Go to [employer] for more information or to chat online.  Call your doctor now or seek immediate medical care if:    You are having withdrawal symptoms. These may include nausea or vomiting, sweating, shakiness, and anxiety.   Watch closely for changes in your health, and be sure to contact your doctor if:    You have a relapse.     You need more help or support to stop.   Where can you learn more?  Go to https://www.[employer].net/patiented  Enter H573 in the search box to learn more about \"Substance Use Disorder: Care Instructions.\"  Current as of: November 15, 2023               Content Version: 14.0    7257-5268 [employer].   Care instructions adapted under license by your healthcare professional. If you have questions about a medical condition or this instruction, always ask your healthcare professional. [employer] disclaims any warranty or liability for your use of this information.      Safer Sex: Care Instructions  Overview  Safer sex is a way to reduce your risk of getting a sexually transmitted infection (STI). It can also help prevent pregnancy.  Several products can help you practice safer sex and reduce your chance of STIs. One of the best is a condom. There are internal and external condoms. You can use a special rubber sheet (dental dam) for protection during oral sex. Disposable gloves can keep your hands from touching blood, semen, or other body fluids that can carry infections.  Remember that birth control methods such as diaphragms, IUDs, foams, and birth control pills do not stop you from getting STIs.  Follow-up care is a key part of your treatment and safety. Be sure to make and go to all appointments, and call your doctor if you are having problems. It's also a good idea to know your test results and keep a list of the medicines you take.  How can you care for yourself at home?  Think about " "getting vaccinated to help prevent hepatitis A, hepatitis B, and human papillomavirus (HPV). They can be spread through sex.  Use a condom every time you have sex. Use an external condom, which goes on the penis. Or use an internal condom, which goes into the vagina or anus.  Make sure you use the right size external condom. A condom that's too small can break easily. A condom that's too big can slip off during sex.  Use a new condom each time you have sex. Be careful not to poke a hole in the condom when you open the wrapper.  Don't use an internal condom and an external condom at the same time.  Never use petroleum jelly (such as Vaseline), grease, hand lotion, baby oil, or anything with oil in it. These products can make holes in the condom.  After intercourse, hold the edge of the condom as you remove it. This will help keep semen from spilling out of the condom.  Do not have sex with anyone who has symptoms of an STI, such as sores on the genitals or mouth.  Do not drink a lot of alcohol or use drugs before sex.  Limit your sex partners. Sex with one partner who has sex only with you can reduce your risk of getting an STI.  Don't share sex toys. But if you do share them, use a condom and clean the sex toys between each use.  Talk to any partners before you have sex. Talk about what you feel comfortable with and whether you have any boundaries with sex. And find out if your partner or partners may be at risk for any STI. Keep in mind that a person may be able to spread an STI even if they do not have symptoms. You and any partners may want to get tested for STIs.  Where can you learn more?  Go to https://www.[employer].net/patiented  Enter B608 in the search box to learn more about \"Safer Sex: Care Instructions.\"  Current as of: November 27, 2023               Content Version: 14.0    0586-9500 [employer].   Care instructions adapted under license by your healthcare professional. If you have " questions about a medical condition or this instruction, always ask your healthcare professional. [employer] disclaims any warranty or liability for your use of this information.

## 2024-08-26 ENCOUNTER — TELEPHONE (OUTPATIENT)
Dept: ADDICTION MEDICINE | Facility: CLINIC | Age: 29
End: 2024-08-26
Payer: COMMERCIAL

## 2024-08-26 NOTE — TELEPHONE ENCOUNTER
Received referral for addiction med. Patient is already an established patient in the Recovery clinic. Last visit was 6/14/24 with Socorro Oconnor.     Patient is willing to be seen again at the clinic from notes from primary provider.     Please call patient and set up an appt.     Teresa Vincent RN on 8/26/2024 at 9:15 AM

## 2024-08-26 NOTE — TELEPHONE ENCOUNTER
[employer] message sent encouraging patient to call to schedule an appointment or stop by during walk-in hours.    [facility]  [address], [address]   [place], [place], [address]  Phone: [phone]  Fax: [phone]    Open Monday-Friday  Closed over lunch hour  Walk in hours: 9am-11:30am and 12:30-3pm    Chantal Jean-Baptiste RN on 8/26/2024 at 10:30 AM

## 2024-08-31 ENCOUNTER — MYC MEDICAL ADVICE (OUTPATIENT)
Dept: FAMILY MEDICINE | Facility: CLINIC | Age: 29
End: 2024-08-31
Payer: COMMERCIAL

## 2024-08-31 DIAGNOSIS — F33.1 MODERATE EPISODE OF RECURRENT MAJOR DEPRESSIVE DISORDER (H): Primary | ICD-10-CM

## 2024-08-31 NOTE — LETTER
My Depression Action Plan  Name: Ileana Thorne   Date of Birth 1995  Date: 9/6/2024    My doctor: Dorota Marquis   My clinic: [facility]  [address] [place] [address] [phone]            GREEN    ZONE   Good Control    What it looks like:   Things are going generally well. You have normal up s and down s. You may even feel depressed from time to time, but bad moods usually last less than a day.   What you need to do:  Continue to care for yourself (see self care plan)  Check your depression survival kit and update it as needed  Follow your physician s recommendations including any medication.  Do not stop taking medication unless you consult with your physician first.             YELLOW         ZONE Getting Worse    What it looks like:   Depression is starting to interfere with your life.   It may be hard to get out of bed; you may be starting to isolate yourself from others.  Symptoms of depression are starting to last most all day and this has happened for several days.   You may have suicidal thoughts but they are not constant.   What you need to do:     Call your care team, your response to treatment will improve if you keep your care team informed of your progress. Yellow periods are signs an adjustment may need to be made.     Continue your self-care, even if you have to fake it!    Talk to someone in your support network    Open up your depression survival kit             RED    ZONE Medical Alert - Get Help    What it looks like:   Depression is seriously interfering with your life.   You may experience these or other symptoms: You can t get out of bed most days, can t work or engage in other necessary activities, you have trouble taking care of basic hygiene, or basic responsibilities, thoughts of suicide or death that will not go away, self-injurious behavior.     What you need to do:  Call your care team and request a same-day appointment. If they  are not available (weekends or after hours) call your local crisis line, emergency room or 911.      Electronically signed by: LACY Blas CNP, September 6, 2024    Depression Self Care Plan / Survival Kit    Self-Care for Depression  Here s the deal. Your body and mind are really not as separate as most people think.  What you do and think affects how you feel and how you feel influences what you do and think. This means if you do things that people who feel good do, it will help you feel better.  Sometimes this is all it takes.  There is also a place for medication and therapy depending on how severe your depression is, so be sure to consult with your medical provider and/ or Behavioral Health Consultant if your symptoms are worsening or not improving.     In order to better manage my stress, I will:    Exercise  Get some form of exercise, every day. This will help reduce pain and release endorphins, the  feel good  chemicals in your brain. This is almost as good as taking antidepressants!  This is not the same as joining a gym and then never going! (they count on that by the way ) It can be as simple as just going for a walk or doing some gardening, anything that will get you moving.      Hygiene   Maintain good hygiene (Get out of bed in the morning, Make your bed, Brush your teeth, Take a shower, and Get dressed like you were going to work, even if you are unemployed).  If your clothes don't fit try to get ones that do.    Diet  I will strive to eat foods that are good for me, drink plenty of water, and avoid excessive sugar, caffeine, alcohol, and other mood-altering substances.  Some foods that are helpful in depression are: complex carbohydrates, B vitamins, flaxseed, fish or fish oil, fresh fruits and vegetables.    Psychotherapy  I agree to participate in Individual Therapy (if recommended).    Medication  If prescribed medications, I agree to take them.  Missing doses can result in serious side  effects.  I understand that drinking alcohol, or other illicit drug use, may cause potential side effects.  I will not stop my medication abruptly without first discussing it with my provider.    Staying Connected With Others  I will stay in touch with my friends, family members, and my primary care provider/team.    Use your imagination  Be creative.  We all have a creative side; it doesn t matter if it s oil painting, sand castles, or mud pies! This will also kick up the endorphins.    Witness Beauty  (AKA stop and smell the roses) Take a look outside, even in mid-winter. Notice colors, textures. Watch the squirrels and birds.     Service to others  Be of service to others.  There is always someone else in need.  By helping others we can  get out of ourselves  and remember the really important things.  This also provides opportunities for practicing all the other parts of the program.    Humor  Laugh and be silly!  Adjust your TV habits for less news and crime-drama and more comedy.    Control your stress  Try breathing deep, massage therapy, biofeedback, and meditation. Find time to relax each day.     My support system    Clinic Contact:  Phone number:    Contact 1:  Phone number:    Contact 2:  Phone number:    Church/:  Phone number:    Therapist:  Phone number:    Local crisis center:    Phone number:    Other community support:  Phone number:

## 2024-09-04 RX ORDER — ARIPIPRAZOLE 10 MG/1
10 TABLET ORAL DAILY
Qty: 7 TABLET | Refills: 0 | Status: SHIPPED | OUTPATIENT
Start: 2024-09-04

## 2024-09-09 ENCOUNTER — TELEPHONE (OUTPATIENT)
Dept: FAMILY MEDICINE | Facility: CLINIC | Age: 29
End: 2024-09-09
Payer: COMMERCIAL

## 2024-09-09 DIAGNOSIS — Z30.012 ENCOUNTER FOR EMERGENCY CONTRACEPTION: ICD-10-CM

## 2024-09-09 DIAGNOSIS — Z30.09 ENCOUNTER FOR COUNSELING REGARDING CONTRACEPTION: ICD-10-CM

## 2024-09-09 RX ORDER — LEVONORGESTREL 1.5 MG/1
1.5 TABLET ORAL ONCE
Qty: 1 TABLET | Refills: 0 | Status: CANCELLED | OUTPATIENT
Start: 2024-09-09 | End: 2024-09-09

## 2024-09-09 NOTE — TELEPHONE ENCOUNTER
Spoke with patient to verify if unprotected intercourse had occurred recently. Patient reports that she had unprotected sex 9/8/24.

## 2024-09-09 NOTE — TELEPHONE ENCOUNTER
levonorgestrel (PLAN B) 1.5 MG tablet (Discontinued) 1 tablet 0 4/24/2024 4/29/2024 No   Sig - Route: Take 1 tablet (1.5 mg) by mouth once for 1 dose - Oral   Sent to pharmacy as: Levonorgestrel 1.5 MG Oral Tablet (PLAN B)     Pharmacy requesting a refill

## 2024-09-13 ENCOUNTER — NURSE TRIAGE (OUTPATIENT)
Dept: FAMILY MEDICINE | Facility: CLINIC | Age: 29
End: 2024-09-13
Payer: COMMERCIAL

## 2024-09-13 NOTE — TELEPHONE ENCOUNTER
Provider Response to 2nd Level Triage Request    I have reviewed the RN documentation. My recommendation is:  Schedule with psychiatry as offered and crisis numbers given.  LACY Blas CNP

## 2024-09-13 NOTE — TELEPHONE ENCOUNTER
"Nurse Triage SBAR    Is this a 2nd Level Triage? YES, LICENSED PRACTITIONER REVIEW IS REQUIRED    Situation: Anxiety    Background: Patient seen by Dorota Marquis NP 8/21/24.     See [facility] encounter 8/31/24. Patient was attempted to be contacted 9/13/24 to triage if needed for any possible crisis situations.     Assessment: Patient returned call.     Patient difficult to understand due to slurred speech and not speaking clearly.     Patient stating no one will help her. States she got fired from her job today \"because I am speaking too slow\". Patient states she is needing medication. Patient was scheduled with Psychiatry, but did not go to appointment as she describes that the doctor she was scheduled with will not prescribe her the medication she is wanting.     Offered phone number for patient to get scheduled with alternate provider. Patient declined. Informed patient she will need a Psychiatry evaluation, so they can prescribe appropriate medications.     Patient denies thoughts of self harm. States she has anxiety. Writer asked if she feels unable to cope, patient answers yes.     Protocol Recommended Disposition:   Go To ED/UCC Now (Or To Office With PCP Approval)    Recommendation: Patient ends call prior to discussing recommendation to go to Emergency Department due to severe anxiety/feeling unable to cope.     Routed to provider    Does the patient meet one of the following criteria for ADS visit consideration? 16+ years old, with an [facility] PCP     TIP  Providers, please consider if this condition is appropriate for management at one of our Acute and Diagnostic Services sites.     If patient is a good candidate, please use dotphrase <dot>triageresponse and select Refer to ADS to document. Reason for Disposition   SEVERE anxiety (e.g., extremely anxious with intense emotional symptoms such as feeling of unreality, urge to flee, unable to calm down; unable to cope or function), which is not better after 10 " minutes of reassurance and Care Advice    Additional Information   Negative: SEVERE difficulty breathing (e.g., struggling for each breath, speaks in single words)   Negative: Bluish (or gray) lips or face   Negative: Difficult to awaken or acting confused (e.g., disoriented, slurred speech)   Negative: Hysterical or combative behavior   Negative: Sounds like a life-threatening emergency to the triager   Negative: Chest pain   Negative: Palpitations, skipped heartbeat, or rapid heartbeat is main symptom   Negative: Cough is main symptom   Negative: Suicide thoughts, threats, attempts, or questions   Negative: Depression is main problem or symptom (e.g., feelings of sadness or hopelessness)   Negative: Difficulty breathing and persists > 10 minutes and not relieved by reassurance provided by triager   Negative: Lightheadedness or dizziness and persists > 10 minutes and not relieved by reassurance provided by triager    Protocols used: Anxiety and Panic Attack-A-[place]

## 2024-09-13 NOTE — TELEPHONE ENCOUNTER
Reason for Disposition   SEVERE anxiety (e.g., extremely anxious with intense emotional symptoms such as feeling of unreality, urge to flee, unable to calm down; unable to cope or function), which is not better after 10 minutes of reassurance and Care Advice    Protocols used: Anxiety and Panic Attack-A-[place]

## 2024-09-26 ENCOUNTER — MYC REFILL (OUTPATIENT)
Dept: FAMILY MEDICINE | Facility: CLINIC | Age: 29
End: 2024-09-26
Payer: COMMERCIAL

## 2024-09-26 ENCOUNTER — MYC MEDICAL ADVICE (OUTPATIENT)
Dept: FAMILY MEDICINE | Facility: CLINIC | Age: 29
End: 2024-09-26
Payer: COMMERCIAL

## 2024-09-26 DIAGNOSIS — Z30.09 ENCOUNTER FOR COUNSELING REGARDING CONTRACEPTION: ICD-10-CM
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
## 2024-10-07 RX ORDER — DESIPRAMINE HYDROCHLORIDE 25 MG/1
TABLET ORAL
Qty: 60 TABLET | Refills: 0 | OUTPATIENT
Start: 2024-10-07

## 2024-10-30 ENCOUNTER — TELEPHONE (OUTPATIENT)
Dept: BEHAVIORAL HEALTH | Facility: CLINIC | Age: 29
End: 2024-10-30
Payer: COMMERCIAL

## 2024-10-30 NOTE — TELEPHONE ENCOUNTER
Last [facility] visit: 6/14/24  No scheduled [facility] appts.    Patient needs to be seen for refills. Attempted to call patient. No answer; LVM encouraging patient to visit the clinic and letting her know that she can also get medication at the [facility] Emergency Room if needed. Refresh.iot message also sent.    [place] [id number], [address]   [place], [place], [address]  Phone: [phone]  Fax: [phone]    Open Monday-Friday  Closed over lunch hour  Walk in hours: 9am-11:30am and 12:30-3pm    Chantal Jean-Baptiste RN on 10/30/2024 at 3:49 PM

## 2024-10-30 NOTE — TELEPHONE ENCOUNTER
Reason for call:  Other   Patient called regarding (reason for call): prescription  Additional comments: pt is requesting a bridge until she is able to come into clinic . Pt is worried if she don't get sbxn bridge she will use . Pt is also worried about being pregnant and doesn't affect pregnancy. Please call pt back at [phone]     Phone number to reach patient:  Home number on file [phone] (home)    Best Time:  any     Can we leave a detailed message on this number?  YES    Travel screening: Negative

## 2024-11-06 ENCOUNTER — VIRTUAL VISIT (OUTPATIENT)
Dept: INTERNAL MEDICINE | Facility: CLINIC | Age: 29
End: 2024-11-06
Payer: COMMERCIAL

## 2024-11-06 DIAGNOSIS — Z91.199 NO-SHOW FOR APPOINTMENT: Primary | ICD-10-CM

## 2024-11-06 PROCEDURE — 99207 PR NO SHOW FOR SCHEDULED APPT: CPT | Performed by: INTERNAL MEDICINE

## 2024-11-06 NOTE — PROGRESS NOTES
OFFICE VISIT--Video    Ileana is a 29 year old female contacting the clinic today via video, who will use the platform: [employer], [employer], or Other for the visit.  Phone # for [employer], or if Amwell drops:   Telephone Information:   Mobile [phone]          Invitation sent via [employer].  No response  Invitation sent via [employer].  No response  Staff attempted to do contact by phone.  Straight to voicemail  I attempted to contact by phone.  Straight to voicemail    Distant Location (provider location):  Off-site    Video Start Time: 10:40 AM  Video End time:  11:10 AM    Tonny Alcocer MD  Internal Medicine  [facility]

## 2024-11-11 ENCOUNTER — HOSPITAL ENCOUNTER (EMERGENCY)
Facility: HOSPITAL | Age: 29
Discharge: HOME | End: 2024-11-11
Attending: EMERGENCY MEDICINE | Admitting: EMERGENCY MEDICINE
Payer: COMMERCIAL

## 2024-11-11 VITALS
HEART RATE: 91 BPM | WEIGHT: 180 LBS | OXYGEN SATURATION: 99 % | DIASTOLIC BLOOD PRESSURE: 88 MMHG | RESPIRATION RATE: 18 BRPM | HEIGHT: 65 IN | SYSTOLIC BLOOD PRESSURE: 137 MMHG | TEMPERATURE: 98.2 F | BODY MASS INDEX: 29.99 KG/M2

## 2024-11-11 DIAGNOSIS — N39.0 URINARY TRACT INFECTION WITHOUT HEMATURIA, SITE UNSPECIFIED: ICD-10-CM

## 2024-11-11 PROBLEM — G89.29 CHRONIC PAIN OF BOTH FEET: Status: ACTIVE | Noted: 2023-08-09

## 2024-11-11 PROBLEM — F11.11 HISTORY OF HEROIN ABUSE (H): Status: ACTIVE | Noted: 2018-05-04

## 2024-11-11 PROBLEM — M79.672 CHRONIC PAIN OF BOTH FEET: Status: ACTIVE | Noted: 2023-08-09

## 2024-11-11 PROBLEM — R45.851 SUICIDAL IDEATION: Status: ACTIVE | Noted: 2023-03-03

## 2024-11-11 PROBLEM — M79.671 CHRONIC PAIN OF BOTH FEET: Status: ACTIVE | Noted: 2023-08-09

## 2024-11-11 PROBLEM — M54.9 BACK PAIN: Status: ACTIVE | Noted: 2022-08-10

## 2024-11-11 LAB
ALBUMIN UR-MCNC: NEGATIVE MG/DL
ANION GAP SERPL CALCULATED.3IONS-SCNC: 12 MMOL/L (ref 7–15)
APPEARANCE UR: ABNORMAL
BACTERIA #/AREA URNS HPF: ABNORMAL /HPF
BILIRUB UR QL STRIP: NEGATIVE
BUN SERPL-MCNC: 13.4 MG/DL (ref 6–20)
CALCIUM SERPL-MCNC: 8.9 MG/DL (ref 8.8–10.4)
CHLORIDE SERPL-SCNC: 104 MMOL/L (ref 98–107)
COLOR UR AUTO: ABNORMAL
CREAT SERPL-MCNC: 0.73 MG/DL (ref 0.51–0.95)
EGFRCR SERPLBLD CKD-EPI 2021: >90 ML/MIN/1.73M2
ERYTHROCYTE [DISTWIDTH] IN BLOOD BY AUTOMATED COUNT: 11.6 % (ref 10–15)
GLUCOSE SERPL-MCNC: 92 MG/DL (ref 70–99)
GLUCOSE UR STRIP-MCNC: NEGATIVE MG/DL
HCG UR QL: NEGATIVE
HCO3 SERPL-SCNC: 24 MMOL/L (ref 22–29)
HCT VFR BLD AUTO: 44.7 % (ref 35–47)
HGB BLD-MCNC: 15.3 G/DL (ref 11.7–15.7)
HGB UR QL STRIP: NEGATIVE
HYALINE CASTS: 1 /LPF
KETONES UR STRIP-MCNC: NEGATIVE MG/DL
LEUKOCYTE ESTERASE UR QL STRIP: ABNORMAL
MCH RBC QN AUTO: 31 PG (ref 26.5–33)
MCHC RBC AUTO-ENTMCNC: 34.2 G/DL (ref 31.5–36.5)
MCV RBC AUTO: 91 FL (ref 78–100)
NITRATE UR QL: NEGATIVE
PH UR STRIP: 6 [PH] (ref 5–7)
PLATELET # BLD AUTO: 168 10E3/UL (ref 150–450)
POTASSIUM SERPL-SCNC: 4.2 MMOL/L (ref 3.4–5.3)
RBC # BLD AUTO: 4.93 10E6/UL (ref 3.8–5.2)
RBC URINE: 2 /HPF
SODIUM SERPL-SCNC: 140 MMOL/L (ref 135–145)
SP GR UR STRIP: 1.02 (ref 1–1.03)
SQUAMOUS EPITHELIAL: 2 /HPF
UROBILINOGEN UR STRIP-MCNC: <2 MG/DL
WBC # BLD AUTO: 11.4 10E3/UL (ref 4–11)
WBC URINE: 2 /HPF

## 2024-11-11 PROCEDURE — 258N000003 HC RX IP 258 OP 636: Performed by: EMERGENCY MEDICINE

## 2024-11-11 PROCEDURE — 85014 HEMATOCRIT: CPT | Performed by: EMERGENCY MEDICINE

## 2024-11-11 PROCEDURE — 96375 TX/PRO/DX INJ NEW DRUG ADDON: CPT

## 2024-11-11 PROCEDURE — 96361 HYDRATE IV INFUSION ADD-ON: CPT

## 2024-11-11 PROCEDURE — 87086 URINE CULTURE/COLONY COUNT: CPT | Performed by: EMERGENCY MEDICINE

## 2024-11-11 PROCEDURE — 96365 THER/PROPH/DIAG IV INF INIT: CPT

## 2024-11-11 PROCEDURE — 250N000011 HC RX IP 250 OP 636: Performed by: EMERGENCY MEDICINE

## 2024-11-11 PROCEDURE — 36415 COLL VENOUS BLD VENIPUNCTURE: CPT | Performed by: EMERGENCY MEDICINE

## 2024-11-11 PROCEDURE — 82947 ASSAY GLUCOSE BLOOD QUANT: CPT | Performed by: EMERGENCY MEDICINE

## 2024-11-11 PROCEDURE — 80048 BASIC METABOLIC PNL TOTAL CA: CPT | Performed by: EMERGENCY MEDICINE

## 2024-11-11 PROCEDURE — 81025 URINE PREGNANCY TEST: CPT | Performed by: EMERGENCY MEDICINE

## 2024-11-11 PROCEDURE — 99284 EMERGENCY DEPT VISIT MOD MDM: CPT | Mod: 25

## 2024-11-11 PROCEDURE — 81003 URINALYSIS AUTO W/O SCOPE: CPT | Performed by: EMERGENCY MEDICINE

## 2024-11-11 RX ORDER — CEFTRIAXONE 1 G/1
1 INJECTION, POWDER, FOR SOLUTION INTRAMUSCULAR; INTRAVENOUS ONCE
Status: COMPLETED | OUTPATIENT
Start: 2024-11-11 | End: 2024-11-11

## 2024-11-11 RX ORDER — CEFDINIR 300 MG/1
300 CAPSULE ORAL 2 TIMES DAILY
Qty: 14 CAPSULE | Refills: 0 | Status: SHIPPED | OUTPATIENT
Start: 2024-11-11 | End: 2024-11-18

## 2024-11-11 RX ORDER — KETOROLAC TROMETHAMINE 15 MG/ML
15 INJECTION, SOLUTION INTRAMUSCULAR; INTRAVENOUS ONCE
Status: COMPLETED | OUTPATIENT
Start: 2024-11-11 | End: 2024-11-11

## 2024-11-11 RX ORDER — ONDANSETRON 4 MG/1
4 TABLET, ORALLY DISINTEGRATING ORAL EVERY 6 HOURS PRN
Qty: 10 TABLET | Refills: 0 | Status: SHIPPED | OUTPATIENT
Start: 2024-11-11

## 2024-11-11 RX ORDER — ONDANSETRON 2 MG/ML
4 INJECTION INTRAMUSCULAR; INTRAVENOUS ONCE
Status: COMPLETED | OUTPATIENT
Start: 2024-11-11 | End: 2024-11-11

## 2024-11-11 RX ADMIN — SODIUM CHLORIDE 1000 ML: 9 INJECTION, SOLUTION INTRAVENOUS at 11:36

## 2024-11-11 RX ADMIN — KETOROLAC TROMETHAMINE 15 MG: 15 INJECTION, SOLUTION INTRAMUSCULAR; INTRAVENOUS at 11:38

## 2024-11-11 RX ADMIN — CEFTRIAXONE SODIUM 1 G: 1 INJECTION, POWDER, FOR SOLUTION INTRAMUSCULAR; INTRAVENOUS at 12:31

## 2024-11-11 RX ADMIN — ONDANSETRON 4 MG: 2 INJECTION INTRAMUSCULAR; INTRAVENOUS at 11:36

## 2024-11-11 ASSESSMENT — ACTIVITIES OF DAILY LIVING (ADL)
ADLS_ACUITY_SCORE: 0

## 2024-11-11 NOTE — DISCHARGE INSTRUCTIONS
Please the next dose of your antibiotic this evening before bed.    Antibiotics take about 24 hours to fully kick in.  That is when the inflammation and infection slows down.  If you notice that your symptoms are getting worse after 24 hours, or if at any time you have nausea, vomiting, or are unable to keep down antibiotics, I would like you to come back to the emergency department.

## 2024-11-11 NOTE — ED TRIAGE NOTES
Pt presents to ED with nausea, vomiting, abdominal pain for 1 week - unable to keep anything down. Also endorses constipation, occasional bright red blood in stool. LMP 1 week ago, last BM 2 days ago.

## 2024-11-11 NOTE — ED PROVIDER NOTES
EMERGENCY DEPARTMENT ENCOUNTER      NAME: Ileana Thorne  AGE: 29 year old female  YOB: 1995  MRN: [id number]  EVALUATION DATE & TIME: 11/11/2024 10:45 AM    PCP: Dorota Marquis    ED PROVIDER: Jd Caro M.D.      Chief Complaint   Patient presents with    Abdominal Pain    Nausea & Vomiting         FINAL IMPRESSION:  1. Urinary tract infection without hematuria, site unspecified          ED COURSE & MEDICAL DECISION MAKING:    Pertinent Labs & Imaging studies reviewed below.  All EKGs below represent my independent interpretation.   ED Course as of 11/11/24 1439   Mon Nov 11, 2024   1113 Patient is a 29-year-old woman who presents with abdominal pain, nausea and vomiting.  Symptoms have been waxing and waning over the last week, reporting difficulty keeping down any fluids.  On arrival here she is hypertensive, normal temperature, heart rate of 93.  She is uncomfortable appearing, reports suprapubic pain.  IV fluids, Zofran, Toradol ordered, UTI versus pyelonephritis in the differential, less likely to be pregnancy or ectopic given recent menses.   1211 Mild elevation of white blood cell count 11.4, otherwise normal BMP.  Pregnancy test is negative.  Urinalysis is not overtly concerning for UTI, but symptomatically this fits.  Patient with modest improvement in symptoms with the medications given so far.  She also adds that she has a history of appendectomy.  Pain could be due to complicated cystitis or early pyelonephritis.  Will treat with Rocephin and sent home with a course of Omnicef, Zofran and we discussed return precautions.       Medical Decision Making  Obtained supplemental history:Supplemental history obtained?: No  Reviewed external records: External records reviewed?: No  Care impacted by chronic illness:Alcohol and/or Drug Abuse or Dependence  Care significantly affected by social determinants of health:N/A  Did you consider but not order tests?: Work up considered but not  performed and documented in chart, if applicable  Did you interpret images independently?: Independent interpretation of ECG and images noted in documentation, when applicable.  Consultation discussion with other provider: Phone conversation with consultants will be documented in the ED Course  Discharge. I prescribed additional prescription strength medication(s) as charted. N/A.    MIPS Criteria Documentation: Not Applicable      MEDICATIONS GIVEN IN THE EMERGENCY:  Medications   sodium chloride 0.9% BOLUS 1,000 mL (0 mLs Intravenous Stopped 11/11/24 1234)   ketorolac (TORADOL) injection 15 mg (15 mg Intravenous $Given 11/11/24 1138)   ondansetron (ZOFRAN) injection 4 mg (4 mg Intravenous $Given 11/11/24 1136)   cefTRIAXone (ROCEPHIN) 1 g vial to attach to  mL bag for ADULTS or NS 50 mL bag for PEDS (0 g Intravenous Stopped 11/11/24 1300)         NEW PRESCRIPTIONS STARTED AT TODAY'S ER VISIT  Discharge Medication List as of 11/11/2024  1:01 PM        START taking these medications    Details   cefdinir (OMNICEF) 300 MG capsule Take 1 capsule (300 mg) by mouth 2 times daily for 7 days., Disp-14 capsule, R-0, E-Prescribe      ondansetron (ZOFRAN ODT) 4 MG ODT tab Take 1 tablet (4 mg) by mouth every 6 hours as needed for nausea., Disp-10 tablet, R-0, E-Prescribe                =================================================================    HPI    Ileana Thorne is a 29 year old female who presents to this ED for evaluation of abdominal pain, nausea, and vomiting.    The patient reports she has felt nauseous and has been vomiting for almost a week. She states she has not been able to keep anything down in that time period and she has had an abdominal cramping pain. She endorses having to urinate more frequently. She denies having any pain with urination. The patient states she had her period for 3 days recently and did not feel very sick for that time period.     VITALS:  /88   Pulse 91   Temp 98.2  " F (36.8  C)   Resp 18   Ht 1.651 m (5' 5\")   Wt 81.6 kg (180 lb)   LMP 11/04/2024   SpO2 99%   BMI 29.95 kg/m      PHYSICAL EXAM    Constitutional: Uncomfortable appearing.  HENT: Atraumatic, mucous membranes moist, nose normal. Neck- Supple, gross ROM intact.   Eyes: Pupils mid-range, conjunctiva without injection, no discharge.   Respiratory: Clear to auscultation bilaterally, no respiratory distress, no wheezing, speaks full sentences easily. No cough.  Cardiovascular: Normal heart rate, regular rhythm, no murmurs.   GI: Soft, no masses. Super pubic tenderness without guarding.  Musculoskeletal: Moving all 4 extremities intentionally and without pain. No obvious deformity.  Skin: Warm, dry, no rash.  Neurologic: Alert & oriented x 3, cranial nerves grossly intact.  Psychiatric: Affect normal, cooperative.      I, Kendall SAUCEDO Timmyvishal am serving as a scribe to document services personally performed by Dr. Jd Caro based on my observation and the provider's statements to me. I, Jd Caro MD attest that Kendall S Timmyvishal is acting in a scribe capacity, has observed my performance of the services and has documented them in accordance with my direction.    Jd Caro M.D.  Emergency Medicine  [facility] EMERGENCY DEPARTMENT  [address] [place] [id number]  [phone]  Dept: [phone]       Jd Caro MD  11/11/24 1441    "

## 2024-11-12 LAB — BACTERIA UR CULT: NORMAL

## 2024-11-14 ENCOUNTER — TELEPHONE (OUTPATIENT)
Dept: FAMILY MEDICINE | Facility: CLINIC | Age: 29
End: 2024-11-14
Payer: COMMERCIAL

## 2024-11-14 ENCOUNTER — PATIENT OUTREACH (OUTPATIENT)
Dept: CARE COORDINATION | Facility: CLINIC | Age: 29
End: 2024-11-14
Payer: COMMERCIAL

## 2024-11-14 NOTE — TELEPHONE ENCOUNTER
November 14, 2024    Request for Medical Opinion was received via fax for Dorota Marquis DNP.  Patient label was attached to paperwork and placed in provider's inbox to be signed.    Erum Harris

## 2024-11-14 NOTE — LETTER
Ileana Thorne  [address]  [place] [address]    Dear Ileana Thorne,      I am a team member within the Connected Care Resource Center with [facility]. I recently tried to reach you to ensure you were doing well following a recent visit within our health system. I also wanted to take this chance to introduce Clinic Care Coordination.     Below is a description of Clinic Care Coordination and how this team can further assist you:       The Clinic Care Coordination team is made up of a Registered Nurse, , Financial Resource Worker, and a Community Health Worker who understand and can help navigate the health care system. The goal of clinic care coordination is to help you manage your health, improve access to care, and achieve optimal health outcomes. They work alongside your provider to assist you in determining your health and social needs, obtain health care and community resources, and provide you with necessary information and education. Clinic Care Coordination can work with you through any barriers and develop a care plan that helps coordinate and strengthen the relationship between you and your care team.    If you wish to connect with the Clinic Care Coordination Team, please let your [facility] Primary Care Provider or Clinic Care Team know and they can place a referral. The Clinic Care Coordination team will then reach out by phone to further support you.    We are focused on providing you with the highest-quality healthcare experience possible.    Sincerely,   Ludy GARDINER  Community Health Worker    Connected Care Resources   [facility]

## 2024-11-14 NOTE — PROGRESS NOTES
[facility] Care Resource Center Contact  [facility]/Voicemail     Clinical Data: Care Coordination ED-sourced Outreach-     Outreach attempted x 2.  Left message on patient's voicemail, providing [facility]'s 24/7 scheduling and nurse triage phone number [phone] ([phone]) for questions/concerns and/or to schedule an appt with an [facility] provider.      Care Coordination introduction letter with explanation of Clinic Care Coordination services sent to patient via [employer]. Clinic Care Coordination services remain available via referral if needed.    Plan: [facility] will do no further outreaches at this time.       MICHELE Giron  [facility] Resource [place], [facility]    *Connected Care Resource Team does NOT follow patient ongoing. Referrals are identified based on internal discharge reports and the outreach is to ensure patient has an understanding of their discharge instructions.

## 2024-11-20 ENCOUNTER — TELEPHONE (OUTPATIENT)
Dept: ENDOCRINOLOGY | Facility: CLINIC | Age: 29
End: 2024-11-20
Payer: COMMERCIAL

## 2024-12-01 ENCOUNTER — E-VISIT (OUTPATIENT)
Dept: FAMILY MEDICINE | Facility: CLINIC | Age: 29
End: 2024-12-01
Payer: COMMERCIAL

## 2024-12-01 ENCOUNTER — VIRTUAL VISIT (OUTPATIENT)
Dept: URGENT CARE | Facility: CLINIC | Age: 29
End: 2024-12-01
Payer: COMMERCIAL

## 2024-12-01 DIAGNOSIS — Z20.2 EXPOSURE TO CHLAMYDIA: ICD-10-CM

## 2024-12-01 DIAGNOSIS — N89.8 VAGINAL DISCHARGE: Primary | ICD-10-CM

## 2024-12-01 DIAGNOSIS — Z20.2 STD EXPOSURE: Primary | ICD-10-CM

## 2024-12-01 DIAGNOSIS — Z20.2 EXPOSURE TO GONORRHEA: ICD-10-CM

## 2024-12-01 PROCEDURE — 99213 OFFICE O/P EST LOW 20 MIN: CPT | Mod: 95

## 2024-12-01 RX ORDER — DOXYCYCLINE 100 MG/1
100 CAPSULE ORAL 2 TIMES DAILY
Qty: 14 CAPSULE | Refills: 0 | Status: SHIPPED | OUTPATIENT
Start: 2024-12-01 | End: 2024-12-08

## 2024-12-01 RX ORDER — CEFTRIAXONE SODIUM 1 G
500 VIAL (EA) INJECTION ONCE
OUTPATIENT
Start: 2024-12-01

## 2024-12-01 NOTE — PATIENT INSTRUCTIONS
Thank you for choosing us for your care. Given your symptoms, I would like you to do a lab-only visit to determine what is causing them.  I have placed the orders.  Please schedule an appointment with the lab right here in [place], or call [phone].  I will let you know when the results are back and next steps to take.    Schedule a Lab Only appointment here.

## 2024-12-01 NOTE — PROGRESS NOTES
"  Ileana Thorne is a 29 year old female who is being evaluated via a billable video visit.      The patient has been notified of following at the time of scheduling video visit:     \"This video visit will be conducted via a video call between you and your physician/provider. We have found that certain health care needs can be provided without the need for a physical exam.  This service lets us provide the care you need with a video conversation.  If a prescription is necessary we can send it directly to your pharmacy.  If lab work is needed we can place an order for that and you can then stop by our lab to have the test done at a later time.\"   Patient has given consent for video visit?  YES    SUBJECTIVE:  Ileana Thorne is an 29 year old female who presents for exposure to gonorrhea and chlamydia.  Just found out that a sexual partner has both of these and she had unprotected sex about 4 days ago with this partner.  She is having some vaginal discharge.  No fevers.  No n/v/d.    PMH:   has a past medical history of Anxiety, Arthritis, Depressive disorder, Essential hypertension (02/25/2015), Marginal cord insertion (10/11/2016), PTSD (post-traumatic stress disorder), Pulmonary embolism (H) (2021), Substance abuse (H) (02/13/2014), and Uncomplicated asthma.  Patient Active Problem List   Diagnosis    Essential hypertension    Generalized anxiety disorder    Methamphetamine addiction (H)    Tobacco dependence syndrome    History of pre-eclampsia in prior pregnancy, currently pregnant in second trimester    Mixed anxiety depressive disorder    Insomnia    Opioid use disorder, severe, dependence (H)    Depression with suicidal ideation    Night terrors    Anxiety    History of pulmonary embolism    Cervicalgia    Other chronic pain    Methamphetamine abuse in remission (H)    PTSD (post-traumatic stress disorder)    Class 2 severe obesity due to excess calories with serious comorbidity in adult (H)    Major " depression, recurrent (H)    Borderline personality disorder (H)    Back pain    Chronic pain of both feet    History of heroin abuse (H)    Major depressive disorder, recurrent, in full remission (H)    Suicidal ideation     Social History     Socioeconomic History    Marital status: Single   Tobacco Use    Smoking status: Every Day     Current packs/day: 0.50     Types: Cigarettes    Smokeless tobacco: Never   Vaping Use    Vaping status: Former    Substances: Nicotine, Flavoring    Devices: Disposable   Substance and Sexual Activity    Alcohol use: No    Drug use: Not Currently     Types: Fentanyl     Comment: heroin last used about 9/13/16, started taking Methadone at that time, last took Methadone this morning (3/20)    Sexual activity: Never     Partners: Male     Birth control/protection: None   Social History Narrative    ** Merged History Encounter **         In an outpatient drug treatment program during the day (started 1/2017)     Social Drivers of Health     Financial Resource Strain: Low Risk  (8/21/2024)    Financial Resource Strain     Within the past 12 months, have you or your family members you live with been unable to get utilities (heat, electricity) when it was really needed?: No   Food Insecurity: Not on File (9/26/2024)    Received from [employer]    Food Insecurity     Food: 0   Transportation Needs: Low Risk  (8/21/2024)    Transportation Needs     Within the past 12 months, has lack of transportation kept you from medical appointments, getting your medicines, non-medical meetings or appointments, work, or from getting things that you need?: No   Physical Activity: Insufficiently Active (8/21/2024)    Exercise Vital Sign     Days of Exercise per Week: 4 days     Minutes of Exercise per Session: 30 min   Stress: Stress Concern Present (8/21/2024)    [employer] - Occupational Stress Questionnaire     Feeling of Stress : Very much   Social Connections: Not on File  (9/13/2024)    Received from [facility]    Social Connections     Connectedness: 0   Interpersonal Safety: Low Risk  (8/21/2024)    Interpersonal Safety     Do you feel physically and emotionally safe where you currently live?: Yes     Within the past 12 months, have you been hit, slapped, kicked or otherwise physically hurt by someone?: No     Within the past 12 months, have you been humiliated or emotionally abused in other ways by your partner or ex-partner?: No   Housing Stability: Low Risk  (8/21/2024)    Housing Stability     Do you have housing? : Yes     Are you worried about losing your housing?: Patient declined     No family history on file.    ALLERGIES:  Seasonal allergies    Current Outpatient Medications   Medication Sig Dispense Refill    ARIPiprazole (ABILIFY) 10 MG tablet Take 1 tablet (10 mg) by mouth daily. 7 tablet 0    cyclobenzaprine (FLEXERIL) 10 MG tablet Take 1 tablet (10 mg) by mouth every 8 hours as needed for muscle spasms (chronic pain). 60 tablet 3    naloxone (NARCAN) 4 MG/0.1ML nasal spray Spray 1 spray (4 mg) into one nostril alternating nostrils as needed for opioid reversal every 2-3 minutes until assistance arrives 0.2 mL 3    ondansetron (ZOFRAN ODT) 4 MG ODT tab Take 1 tablet (4 mg) by mouth every 6 hours as needed for nausea. 10 tablet 0    pregabalin (LYRICA) 100 MG capsule Take 1 capsule (100 mg) by mouth 3 times daily. Do not start before September 5, 2024. 90 capsule 2    Ulipristal Acetate (MARCELA) 30 MG tablet Take 1 tablet (30 mg) by mouth once for 1 dose. 1 tablet 0    Ulipristal Acetate (MARCELA) 30 MG tablet Take 1 tablet (30 mg) by mouth once for 1 dose 1 tablet 0     No current facility-administered medications for this visit.         ROS:  ROS is done and is negative for general/constitutional, eye, ENT, Respiratory, cardiovascular, GI, , Skin, musculoskeletal except as noted elsewhere.  All other review of systems negative except as noted elsewhere.      OBJECTIVE:    No  vital signs obtained as is virtual visit    GENERAL: alert and no distress  EYES: Eyes grossly normal to inspection.  No discharge or erythema, or obvious scleral/conjunctival abnormalities.  RESP: No audible wheeze, cough, or visible cyanosis.    SKIN: Visible skin clear. No significant rash, abnormal pigmentation or lesions.  NEURO: Cranial nerves grossly intact.  Mentation and speech appropriate for age.  PSYCH: Appropriate affect, tone, and pace of words         ASSESSMENT/PLAN:    ASSESSMENT / PLAN:  (Z20.2) STD exposure  (primary encounter diagnosis)  Comment: given known expsoure, will tx for gonorrhea and chlamydia and test for other stds.  Plan: HIV Antigen Antibody Combo [WAX9479], Hepatitis        B core antibody [[id number]], Hepatitis B Surface         Antibody [[id number]], Hepatitis B surface antigen        [[id number]], Hepatitis C antibody [[id number]],         Treponema Abs w Reflex to RPR and Titer         [[id number]], Chlamydia trachomatis PCR [[id number]],         Neisseria gonorrhoeae PCR [[id number]],         doxycycline hyclate (VIBRAMYCIN) 100 MG         capsule, cefTRIAXone (ROCEPHIN) in lidocaine 1%        (PF) for IM administration only 500 mg        Await testing and treat as needed based on findings.  Advised pt to avoid intercourse and sexual contact for 7-10 days to prevent transmission to others.  Also advised to assure infected partner has completed treated before sexual contact again    (Z20.2) Exposure to gonorrhea  Comment:   Plan: cefTRIAXone (ROCEPHIN) in lidocaine 1% (PF) for        IM administration only 500 mg        Order IM ceftriaxone and pt to call to schedule nurse visit to receive injection.  Discussed that if unable to get nurse appt today, should go to urgent care to get the injection.  See above.    (Z20.2) Exposure to chlamydia  Comment:   Plan: doxycycline hyclate (VIBRAMYCIN) 100 MG capsule        Start doxy.  See above.        See [employer] for orders, medications, letters, patient  instructions    Brandy Cueto MD  12/1/2024, 7:35 AM    Video-Visit Details    Video Start Time:  7:55a    Type of service:  Video Visit    Video End Time:8:11 AM    Originating Location (pt. Location): Home    Distant Location (provider location):  [facility] URGENT CARE     Platform used for Video Visit: [employer]

## 2024-12-02 ENCOUNTER — MYC REFILL (OUTPATIENT)
Dept: FAMILY MEDICINE | Facility: CLINIC | Age: 29
End: 2024-12-02
Payer: COMMERCIAL

## 2024-12-02 ENCOUNTER — MYC REFILL (OUTPATIENT)
Dept: BEHAVIORAL HEALTH | Facility: CLINIC | Age: 29
End: 2024-12-02
Payer: COMMERCIAL

## 2024-12-02 DIAGNOSIS — M54.2 CERVICALGIA: ICD-10-CM

## 2024-12-02 DIAGNOSIS — F11.93 OPIOID WITHDRAWAL (H): ICD-10-CM

## 2024-12-02 DIAGNOSIS — Z30.09 ENCOUNTER FOR COUNSELING REGARDING CONTRACEPTION: ICD-10-CM

## 2024-12-02 DIAGNOSIS — G89.29 CHRONIC BILATERAL THORACIC BACK PAIN: ICD-10-CM

## 2024-12-02 DIAGNOSIS — M54.6 CHRONIC BILATERAL THORACIC BACK PAIN: ICD-10-CM

## 2024-12-02 RX ORDER — CYCLOBENZAPRINE HCL 10 MG
10 TABLET ORAL EVERY 8 HOURS PRN
Qty: 60 TABLET | Refills: 3 | Status: SHIPPED | OUTPATIENT
Start: 2024-12-02

## 2024-12-02 RX ORDER — PREGABALIN 100 MG/1
100 CAPSULE ORAL 3 TIMES DAILY
Qty: 90 CAPSULE | Refills: 0 | Status: SHIPPED | OUTPATIENT
Start: 2024-12-02

## 2024-12-26 ENCOUNTER — HOSPITAL ENCOUNTER (EMERGENCY)
Facility: CLINIC | Age: 29
Discharge: HOME | End: 2024-12-26
Payer: COMMERCIAL

## 2024-12-26 VITALS
HEART RATE: 79 BPM | TEMPERATURE: 98 F | DIASTOLIC BLOOD PRESSURE: 92 MMHG | RESPIRATION RATE: 18 BRPM | SYSTOLIC BLOOD PRESSURE: 137 MMHG | OXYGEN SATURATION: 98 %

## 2024-12-26 DIAGNOSIS — T74.21XA SEXUAL ASSAULT OF ADULT, INITIAL ENCOUNTER: ICD-10-CM

## 2024-12-26 LAB
HCG SERPL QL: NEGATIVE
HIV 1+2 AB+HIV1 P24 AG SERPL QL IA: NONREACTIVE
HOLD SPECIMEN: NORMAL

## 2024-12-26 PROCEDURE — 87389 HIV-1 AG W/HIV-1&-2 AB AG IA: CPT | Performed by: PHYSICIAN ASSISTANT

## 2024-12-26 PROCEDURE — 250N000009 HC RX 250: Performed by: PHYSICIAN ASSISTANT

## 2024-12-26 PROCEDURE — 96372 THER/PROPH/DIAG INJ SC/IM: CPT | Performed by: PHYSICIAN ASSISTANT

## 2024-12-26 PROCEDURE — 99284 EMERGENCY DEPT VISIT MOD MDM: CPT | Performed by: PHYSICIAN ASSISTANT

## 2024-12-26 PROCEDURE — 36415 COLL VENOUS BLD VENIPUNCTURE: CPT | Performed by: PHYSICIAN ASSISTANT

## 2024-12-26 PROCEDURE — 84703 CHORIONIC GONADOTROPIN ASSAY: CPT | Performed by: PHYSICIAN ASSISTANT

## 2024-12-26 PROCEDURE — 250N000011 HC RX IP 250 OP 636: Performed by: PHYSICIAN ASSISTANT

## 2024-12-26 RX ADMIN — LIDOCAINE HYDROCHLORIDE 500 MG: 10 INJECTION, SOLUTION EPIDURAL; INFILTRATION; INTRACAUDAL; PERINEURAL at 15:36

## 2024-12-26 ASSESSMENT — COLUMBIA-SUICIDE SEVERITY RATING SCALE - C-SSRS
6. HAVE YOU EVER DONE ANYTHING, STARTED TO DO ANYTHING, OR PREPARED TO DO ANYTHING TO END YOUR LIFE?: NO
2. HAVE YOU ACTUALLY HAD ANY THOUGHTS OF KILLING YOURSELF IN THE PAST MONTH?: NO
1. IN THE PAST MONTH, HAVE YOU WISHED YOU WERE DEAD OR WISHED YOU COULD GO TO SLEEP AND NOT WAKE UP?: NO

## 2024-12-26 ASSESSMENT — ACTIVITIES OF DAILY LIVING (ADL): ADLS_ACUITY_SCORE: 45

## 2024-12-26 NOTE — DISCHARGE INSTRUCTIONS
You should follow-up with your primary doctor.  You should also have repeat HIV testing in 6 weeks.  Return to ER for worsening symptoms.   Impaired Gait
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
## 2024-12-26 NOTE — ED TRIAGE NOTES
Triage Assessment (Adult)       Row Name 12/26/24 131          Triage Assessment    Airway WDL WDL        Respiratory WDL    Respiratory WDL WDL        Skin Circulation/Temperature WDL    Skin Circulation/Temperature WDL WDL        Cardiac WDL    Cardiac WDL WDL        Peripheral/Neurovascular WDL    Peripheral Neurovascular WDL WDL        Cognitive/Neuro/Behavioral WDL    Cognitive/Neuro/Behavioral WDL WDL

## 2024-12-30 DIAGNOSIS — M54.2 CERVICALGIA: ICD-10-CM

## 2024-12-30 DIAGNOSIS — F11.93 OPIOID WITHDRAWAL (H): ICD-10-CM

## 2024-12-30 DIAGNOSIS — G89.29 CHRONIC BILATERAL THORACIC BACK PAIN: ICD-10-CM

## 2024-12-30 DIAGNOSIS — M54.6 CHRONIC BILATERAL THORACIC BACK PAIN: ICD-10-CM

## 2024-12-31 RX ORDER — PREGABALIN 100 MG/1
100 CAPSULE ORAL 3 TIMES DAILY
Qty: 90 CAPSULE | Refills: 0 | Status: SHIPPED | OUTPATIENT
Start: 2024-12-31

## 2025-02-12 DIAGNOSIS — F11.93 OPIOID WITHDRAWAL (H): ICD-10-CM

## 2025-02-12 DIAGNOSIS — M54.6 CHRONIC BILATERAL THORACIC BACK PAIN: ICD-10-CM

## 2025-02-12 DIAGNOSIS — M54.2 CERVICALGIA: ICD-10-CM

## 2025-02-12 DIAGNOSIS — G89.29 CHRONIC BILATERAL THORACIC BACK PAIN: ICD-10-CM

## 2025-02-12 RX ORDER — PREGABALIN 100 MG/1
100 CAPSULE ORAL 3 TIMES DAILY
Qty: 90 CAPSULE | Refills: 0 | Status: SHIPPED | OUTPATIENT
Start: 2025-02-12

## 2025-03-17 DIAGNOSIS — M54.2 CERVICALGIA: ICD-10-CM

## 2025-03-17 DIAGNOSIS — F11.93 OPIOID WITHDRAWAL (H): ICD-10-CM

## 2025-03-17 DIAGNOSIS — M54.6 CHRONIC BILATERAL THORACIC BACK PAIN: ICD-10-CM

## 2025-03-17 DIAGNOSIS — G89.29 CHRONIC BILATERAL THORACIC BACK PAIN: ICD-10-CM

## 2025-03-17 RX ORDER — PREGABALIN 100 MG/1
100 CAPSULE ORAL 3 TIMES DAILY
Qty: 90 CAPSULE | Refills: 0 | OUTPATIENT
Start: 2025-03-17

## 2025-04-11 DIAGNOSIS — M54.2 CERVICALGIA: ICD-10-CM

## 2025-04-11 DIAGNOSIS — G89.29 CHRONIC BILATERAL THORACIC BACK PAIN: ICD-10-CM

## 2025-04-11 DIAGNOSIS — F11.93 OPIOID WITHDRAWAL (H): ICD-10-CM

## 2025-04-11 DIAGNOSIS — M54.6 CHRONIC BILATERAL THORACIC BACK PAIN: ICD-10-CM

## 2025-04-11 RX ORDER — PREGABALIN 100 MG/1
100 CAPSULE ORAL 3 TIMES DAILY
Qty: 90 CAPSULE | Refills: 0 | OUTPATIENT
Start: 2025-04-11

## 2025-04-15 RX ORDER — PREGABALIN 100 MG/1
100 CAPSULE ORAL 3 TIMES DAILY
Qty: 24 CAPSULE | Refills: 0 | Status: SHIPPED | OUTPATIENT
Start: 2025-04-15

## 2025-04-15 NOTE — TELEPHONE ENCOUNTER
Patient called and could not make it in today, she did reschedule for a virtual appointment on 4/23/25 but wondering if she can get a short refill until she has appointment.    Pended for review.

## 2025-04-28 DIAGNOSIS — Z30.012 ENCOUNTER FOR EMERGENCY CONTRACEPTION: ICD-10-CM

## 2025-04-28 RX ORDER — LEVONORGESTREL 1.5 MG/1
1.5 TABLET ORAL ONCE
Qty: 1 TABLET | Refills: 0 | OUTPATIENT
Start: 2025-04-28 | End: 2025-04-28

## 2025-04-28 NOTE — TELEPHONE ENCOUNTER
04/28 I called and scheduled pt for a follow up on her Lyrica per Dr. Barakat she also wanted a refill on her Plan B sent to her pharmacy.

## 2025-05-15 ENCOUNTER — VIRTUAL VISIT (OUTPATIENT)
Dept: FAMILY MEDICINE | Facility: CLINIC | Age: 30
End: 2025-05-15
Payer: COMMERCIAL

## 2025-05-15 DIAGNOSIS — M79.7 FIBROMYALGIA: Primary | ICD-10-CM

## 2025-05-15 DIAGNOSIS — J06.9 VIRAL UPPER RESPIRATORY TRACT INFECTION: ICD-10-CM

## 2025-05-15 NOTE — PROGRESS NOTES
"Ileana is a 29 year old who is being evaluated via a billable telephone visit.    What phone number would you like to be contacted at? [phone]  How would you like to obtain your AVS? Clarence  Originating Location (pt. Location): Home    Distant Location (provider location):  On-site  Telephone visit completed due to the patient did not have access to video, while the distant provider did.    Assessment & Plan     (M79.7) Fibromyalgia  (primary encounter diagnosis)  Comment: She has a history of fibromyalgia.  She has been unable to get out of bed to make an appointment.  She needs her Lyrica refilled.  She has been out of Lyrica for a week now.  Her clinic notes state that she needed to be seen for additional refills.  She states she no-showed for her most recent appointment because she was just unable to get out of bed because of the pain.  Plan: I did not feel comfortable prescribing medications over the phone for Lyrica.  I was concerned about her history of drug abuse.  I did not think that pain and being unable to get out of bed because of fibromyalgia was inadequate excuse for her not showing up for appointments.    (J06.9) Viral upper respiratory tract infection  Comment: She also complained of URI symptoms.  She wants some medication.  I told her I did not feel comfortable prescribing medications over the phone for respiratory infection.  She then hung up  Plan:           Nicotine/Tobacco Cessation  She reports that she has been smoking cigarettes. She has never used smokeless tobacco.  Nicotine/Tobacco Cessation Plan  Patient hung up before any counseling could be provided      BMI  Estimated body mass index is 29.95 kg/m  as calculated from the following:    Height as of 11/11/24: 1.651 m (5' 5\").    Weight as of 11/11/24: 81.6 kg (180 lb).             Subjective   Ileana is a 29 year old, presenting for the following health issues:  Refill Request ([place], advised patient that she was supposed to see " her PCP and may not get a refill today. She still wanted to proceed with appt. )        5/15/2025     3:33 PM   Additional Questions   Roomed by Marbella RINCON   Accompanied by Self     History of Present Illness       Reason for visit:  Refill   She is taking medications regularly.          Objective           Vitals:  No vitals were obtained today due to virtual visit.    Physical Exam   General: Alert and no distress //Respiratory: No audible wheeze, cough, or shortness of breath // Psychiatric:  Appropriate affect, tone, and pace of words            Phone call duration: 10 minutes  Signed Electronically by: Cristobal Campbell MD

## 2025-05-20 DIAGNOSIS — G89.29 CHRONIC BILATERAL THORACIC BACK PAIN: ICD-10-CM

## 2025-05-20 DIAGNOSIS — M54.6 CHRONIC BILATERAL THORACIC BACK PAIN: ICD-10-CM

## 2025-05-20 DIAGNOSIS — M79.7 FIBROMYALGIA: ICD-10-CM

## 2025-05-20 RX ORDER — PREGABALIN 100 MG/1
100 CAPSULE ORAL 3 TIMES DAILY
Qty: 50 CAPSULE | Refills: 0 | Status: CANCELLED | OUTPATIENT
Start: 2025-05-20

## 2025-05-20 RX ORDER — PREGABALIN 100 MG/1
100 CAPSULE ORAL 3 TIMES DAILY
Qty: 50 CAPSULE | Refills: 6 | OUTPATIENT
Start: 2025-05-20

## 2025-05-20 RX ORDER — PREGABALIN 100 MG/1
100 CAPSULE ORAL 3 TIMES DAILY
Qty: 9 CAPSULE | Refills: 0 | OUTPATIENT
Start: 2025-05-20

## 2025-05-20 NOTE — TELEPHONE ENCOUNTER
Medication Refill    Contacts       Contact Date/Time Type Contact Phone/Fax    05/20/2025 12:37 PM CDT Phone (Incoming) lIeana Thorne (Self) [phone] (M)            What medication are you calling about (include dose and sig)?: pregabalin (LYRICA) 100 MG capsule     Preferred Pharmacy:    [facility] - [place], [place] - [address]  [place] [address]  Phone: [phone] Fax: [phone]      Controlled Substance Agreement on file:   CSA -- Patient Level:    CSA: None found at the patient level.       Who prescribed the medication?: Dorota HOUSE CNP    Do you need a refill? Yes shares she ran out a day or two ago, made the medication last along time    When did you use the medication last? Could not really provide date repeats it was a day or two ago    Patient offered an appointment? No virtual video scheduled for Friday 05/23/2025    Do you have any questions or concerns?  Yes: seeking bridge fill until Friday. Shares that she has a lot going on right now.  Patient become emotional has to move sounds as if patient is crying.  Assured patient message will be sent to PCP.  Medication radha'd up for approval if appropriate      Could we send this information to you in [employer] or would you prefer to receive a phone call?:   Patient would like to be contacted via [employer] (last log on 05/19/25)

## 2025-05-22 ENCOUNTER — LAB (OUTPATIENT)
Dept: LAB | Facility: CLINIC | Age: 30
End: 2025-05-22
Payer: COMMERCIAL

## 2025-05-22 ENCOUNTER — TELEPHONE (OUTPATIENT)
Dept: BEHAVIORAL HEALTH | Facility: CLINIC | Age: 30
End: 2025-05-22

## 2025-05-22 ENCOUNTER — OFFICE VISIT (OUTPATIENT)
Dept: BEHAVIORAL HEALTH | Facility: CLINIC | Age: 30
End: 2025-05-22
Payer: COMMERCIAL

## 2025-05-22 DIAGNOSIS — Z72.51 HISTORY OF UNPROTECTED SEX: ICD-10-CM

## 2025-05-22 DIAGNOSIS — F11.20 OPIOID USE DISORDER, SEVERE, DEPENDENCE (H): Primary | ICD-10-CM

## 2025-05-22 DIAGNOSIS — Z72.51 HISTORY OF UNPROTECTED SEX: Primary | ICD-10-CM

## 2025-05-22 LAB — HCG UR QL: NEGATIVE

## 2025-05-22 PROCEDURE — 81025 URINE PREGNANCY TEST: CPT

## 2025-05-22 PROCEDURE — 99207 PR NO CHARGE LOS: CPT

## 2025-05-24 ENCOUNTER — HOSPITAL ENCOUNTER (EMERGENCY)
Facility: CLINIC | Age: 30
Discharge: HOME | End: 2025-05-24
Attending: EMERGENCY MEDICINE | Admitting: EMERGENCY MEDICINE
Payer: COMMERCIAL

## 2025-05-24 VITALS
HEART RATE: 75 BPM | WEIGHT: 223.77 LBS | DIASTOLIC BLOOD PRESSURE: 90 MMHG | BODY MASS INDEX: 37.28 KG/M2 | RESPIRATION RATE: 18 BRPM | SYSTOLIC BLOOD PRESSURE: 133 MMHG | OXYGEN SATURATION: 100 % | TEMPERATURE: 98.4 F | HEIGHT: 65 IN

## 2025-05-24 DIAGNOSIS — N72 CERVICITIS: ICD-10-CM

## 2025-05-24 DIAGNOSIS — B96.89 BACTERIAL VAGINOSIS: ICD-10-CM

## 2025-05-24 DIAGNOSIS — N76.0 BACTERIAL VAGINOSIS: ICD-10-CM

## 2025-05-24 LAB
ALBUMIN UR-MCNC: NEGATIVE MG/DL
APPEARANCE UR: ABNORMAL
BILIRUB UR QL STRIP: NEGATIVE
CLUE CELLS: PRESENT
COLOR UR AUTO: ABNORMAL
GLUCOSE UR STRIP-MCNC: NEGATIVE MG/DL
HCG UR QL: NEGATIVE
HGB UR QL STRIP: NEGATIVE
KETONES UR STRIP-MCNC: NEGATIVE MG/DL
LEUKOCYTE ESTERASE UR QL STRIP: ABNORMAL
NITRATE UR QL: NEGATIVE
PH UR STRIP: 6.5 [PH] (ref 5–7)
RBC URINE: 12 /HPF
SP GR UR STRIP: 1.01 (ref 1–1.03)
SQUAMOUS EPITHELIAL: 11 /HPF
T VAGINALIS DNA SPEC QL NAA+PROBE: NOT DETECTED
TRICHOMONAS, WET PREP: ABNORMAL
UROBILINOGEN UR STRIP-MCNC: NORMAL MG/DL
WBC URINE: 9 /HPF
WBC'S/HIGH POWER FIELD, WET PREP: ABNORMAL
YEAST, WET PREP: ABNORMAL

## 2025-05-24 PROCEDURE — 87491 CHLMYD TRACH DNA AMP PROBE: CPT | Performed by: EMERGENCY MEDICINE

## 2025-05-24 PROCEDURE — 99284 EMERGENCY DEPT VISIT MOD MDM: CPT | Mod: 25 | Performed by: EMERGENCY MEDICINE

## 2025-05-24 PROCEDURE — 250N000009 HC RX 250: Performed by: EMERGENCY MEDICINE

## 2025-05-24 PROCEDURE — 87591 N.GONORRHOEAE DNA AMP PROB: CPT | Performed by: EMERGENCY MEDICINE

## 2025-05-24 PROCEDURE — 87661 TRICHOMONAS VAGINALIS AMPLIF: CPT | Performed by: EMERGENCY MEDICINE

## 2025-05-24 PROCEDURE — 250N000011 HC RX IP 250 OP 636: Mod: JZ | Performed by: EMERGENCY MEDICINE

## 2025-05-24 PROCEDURE — 87086 URINE CULTURE/COLONY COUNT: CPT | Performed by: EMERGENCY MEDICINE

## 2025-05-24 PROCEDURE — 87210 SMEAR WET MOUNT SALINE/INK: CPT | Performed by: EMERGENCY MEDICINE

## 2025-05-24 PROCEDURE — 81025 URINE PREGNANCY TEST: CPT | Performed by: EMERGENCY MEDICINE

## 2025-05-24 PROCEDURE — 96372 THER/PROPH/DIAG INJ SC/IM: CPT | Performed by: EMERGENCY MEDICINE

## 2025-05-24 PROCEDURE — 81001 URINALYSIS AUTO W/SCOPE: CPT | Performed by: EMERGENCY MEDICINE

## 2025-05-24 RX ORDER — PREGABALIN 100 MG/1
100 CAPSULE ORAL 2 TIMES DAILY
Qty: 6 CAPSULE | Refills: 0 | Status: SHIPPED | OUTPATIENT
Start: 2025-05-24 | End: 2025-05-27

## 2025-05-24 RX ORDER — PREGABALIN 100 MG/1
100 CAPSULE ORAL ONCE
Status: DISCONTINUED | OUTPATIENT
Start: 2025-05-24 | End: 2025-05-24 | Stop reason: HOSPADM

## 2025-05-24 RX ORDER — VILAZODONE HYDROCHLORIDE 10 MG/1
25 TABLET ORAL DAILY
Qty: 25 TABLET | Refills: 0 | Status: SHIPPED | OUTPATIENT
Start: 2025-05-24 | End: 2025-06-03

## 2025-05-24 RX ORDER — METRONIDAZOLE 500 MG/1
500 TABLET ORAL 2 TIMES DAILY
Qty: 14 TABLET | Refills: 0 | Status: SHIPPED | OUTPATIENT
Start: 2025-05-24 | End: 2025-05-31

## 2025-05-24 RX ORDER — DOXYCYCLINE 100 MG/1
100 CAPSULE ORAL 2 TIMES DAILY
Qty: 14 CAPSULE | Refills: 0 | Status: SHIPPED | OUTPATIENT
Start: 2025-05-24 | End: 2025-05-31

## 2025-05-24 RX ADMIN — LIDOCAINE HYDROCHLORIDE 500 MG: 10 INJECTION, SOLUTION EPIDURAL; INFILTRATION; INTRACAUDAL; PERINEURAL at 15:13

## 2025-05-24 ASSESSMENT — ACTIVITIES OF DAILY LIVING (ADL)
ADLS_ACUITY_SCORE: 45
ADLS_ACUITY_SCORE: 45

## 2025-05-24 ASSESSMENT — COLUMBIA-SUICIDE SEVERITY RATING SCALE - C-SSRS
1. IN THE PAST MONTH, HAVE YOU WISHED YOU WERE DEAD OR WISHED YOU COULD GO TO SLEEP AND NOT WAKE UP?: NO
6. HAVE YOU EVER DONE ANYTHING, STARTED TO DO ANYTHING, OR PREPARED TO DO ANYTHING TO END YOUR LIFE?: NO
2. HAVE YOU ACTUALLY HAD ANY THOUGHTS OF KILLING YOURSELF IN THE PAST MONTH?: NO
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
## 2025-05-25 LAB
BACTERIA UR CULT: NORMAL
C TRACH DNA SPEC QL NAA+PROBE: POSITIVE
N GONORRHOEA DNA SPEC QL NAA+PROBE: POSITIVE
SPECIMEN TYPE: ABNORMAL
SPECIMEN TYPE: ABNORMAL

## 2025-05-26 ENCOUNTER — TELEPHONE (OUTPATIENT)
Dept: NURSING | Facility: CLINIC | Age: 30
End: 2025-05-26
Payer: COMMERCIAL

## 2025-05-26 NOTE — TELEPHONE ENCOUNTER
[facility]    Reason for call: Lab Result Notification     Lab Result (including Rx patient on, if applicable).  If culture, copy of lab report at bottom.  Lab Result: Neisseria gonorrhoea PCR and Chlamydia trachomatis PCR   Component      Latest Ref Rng 5/24/2025  2:37 PM   N Gonorrhea PCR      Negative  Positive !       Component      Latest Ref Rn 5/24/2025  2:37 PM   Chlamydia Trachomatis PCR      Negative  Positive !    Legend:  ! Abnormal  5/24/25 Administered CefTRIAXone (ROCEPHIN) 500 mg in lidocaine injection IM Once  5/24/25 Prescribed Doxycycline hyclate (VIBRAMYCIN) 100 MG capsule Take 1 capsule (100 mg) by mouth 2 times daily for 7 days. - Oral     Creatinine Level (mg/dl)   Creatinine   Date Value Ref Range Status   11/11/2024 0.73 0.51 - 0.95 mg/dL Final   05/14/2017 0.73 0.50 - 0.90 mg/dL Final    Creatinine clearance (ml/min), if applicable    Serum creatinine: 0.73 mg/dL 11/11/24 1131  Estimated creatinine clearance: 131.9 mL/min     RN Recommendations/Instructions per [place] ED lab result protocol:   [facility] ED lab result protocol utilized: N. Gonorrhea and Chlamydia    5/24/25 Presented to ED with symptoms: green vaginal discharge with odor and cramping    Unable to reach patient/caregiver.   Left voicemail message requesting a call back to [phone] between 9 a.m. and 5:30 p.m. for patient's ED/UC lab results.  Letter pended to be sent via [employer].    Tierra James RN

## 2025-05-26 NOTE — LETTER
May 26, 2025        Ileana Thorne  [address]  [place] [address]          Dear Ileana Thorne:    You were seen in the [facility] Emergency Department at [facility] on 5/24/2025.  We are unable to reach you by phone, so we are sending you this letter.     It is important that you call [facility] Emergency Department lab result nurse at [phone], as we have information to relay to you AND/OR we MAY have to make some changes in your treatment.    Best time to call back is between 9AM and 5:30PM, 7 days a week.      Sincerely,     [facility] Emergency Department Lab Result RN  [phone]

## 2025-05-27 NOTE — PROGRESS NOTES
[facility] Recovery Clinic Individual Session Summary     Session Start Time: 3:25 pm     Session End Time: 5:06 pm     Duration: 1 hour 31 minutes      DATA: Peer  met with Ileana Thorne in the Recovery Clinic to introduce and to provide patient further support and resources for their recovery.  Engaged in a discussion regarding where pt is at in terms of their recovery. IRVING Connors assisted Pt with getting Pt into [facility] in [place].  This also involved a trip to the Pharmacy.    Intervention: Facilitated an individual session, utilized active listening, provided validation, utilized motivational interviewing techniques, provided shared experience.    Assessment: Patient appeared     Plan: Peer  will continue to provide support as needed. Provided patient with business card to pt encouraging pt to reach out to peer  if needed additional support and resources.        Patient Identified Goals  To continue to utilize their suboxone as prescribed., To attend and participate in a sober support group meeting., and To continue to take my other medications as prescribed.    Support Needs:   Ongoing care, support and resources for opioid substance use disorder as well as other substances.       Key Risk Factors to Recovery:   PRC encourages being aware of risk factors that can lead to re-use which include avoiding isolation, avoiding triggers and managing cravings in a healthy manner. being open and willing to acceptance and change on a daily basis.  PRC encourages pt to establish a sober network calling tree to reach out to when needed.  Continue to practice honesty with ourselves and trusted support person(s).   PRC encourages regular attendance at recovery based meetings as well as finding a sponsor for mentoring and accountability.   PRC encourages consideration of other services such as counseling for mental health issues which can correlate with our substance  use.      Cesar Connors  May 27, 2025  12:56 PM    Attestation: Donald Welander, [facility] Providers oversight and supervision of care.

## (undated) DEVICE — Device

## (undated) DEVICE — SOL NACL 0.9% IRRIG 1000ML BOTTLE 2F7124

## (undated) DEVICE — LIGHT HANDLE X2

## (undated) DEVICE — BNDG COHESIVE 2"X5YDS UNSTERILE ASSORTED COLORS LF 8962

## (undated) DEVICE — GLOVE BIOGEL PI ULTRATOUCH G SZ 6.5 42165

## (undated) DEVICE — SOL WATER IRRIG 1000ML BOTTLE 2F7114

## (undated) DEVICE — STRAP KNEE/BODY 31143004

## (undated) DEVICE — DECANTER TRANSFER DEVICE 2008S

## (undated) DEVICE — LINEN GOWN X4 5410

## (undated) DEVICE — SOLIDIFIER (USE FOR UP TO 1500CC) MSOLID1500

## (undated) DEVICE — PREP DYNA-HEX 4% CHG SCRUB 4OZ BOTTLE MDS098710

## (undated) DEVICE — LINEN TOWEL PACK X5 5464

## (undated) DEVICE — SUCTION VACUUM CANISTER STANDARD W/LID&CAPS 003987-901

## (undated) DEVICE — SYR 10ML LL W/O NDL 302995

## (undated) DEVICE — COVER PROBE ULTRASOUND 3D W/GEL 5X96" LF 20-P3D596

## (undated) DEVICE — TUBING SUCTION VACUUM COLLECTION 6FTX3/8" 022310

## (undated) DEVICE — SUCTION CANNULA UTERINE 07MM CVD 022107-10

## (undated) DEVICE — SPECIMEN TRAP VACUUM SUCTION SAFETOUCH 003853-902

## (undated) DEVICE — PAD CHUX UNDERPAD 30X36" P3036C

## (undated) DEVICE — GLOVE BIOGEL PI MICRO INDICATOR UNDERGLOVE SZ 7.0 48970

## (undated) RX ORDER — DOXYCYCLINE 100 MG/10ML
INJECTION, POWDER, LYOPHILIZED, FOR SOLUTION INTRAVENOUS
Status: DISPENSED
Start: 2023-12-11

## (undated) RX ORDER — ACETAMINOPHEN 325 MG/1
TABLET ORAL
Status: DISPENSED
Start: 2023-12-11

## (undated) RX ORDER — DEXAMETHASONE SODIUM PHOSPHATE 4 MG/ML
INJECTION, SOLUTION INTRA-ARTICULAR; INTRALESIONAL; INTRAMUSCULAR; INTRAVENOUS; SOFT TISSUE
Status: DISPENSED
Start: 2023-12-11

## (undated) RX ORDER — FENTANYL CITRATE 50 UG/ML
INJECTION, SOLUTION INTRAMUSCULAR; INTRAVENOUS
Status: DISPENSED
Start: 2023-12-11

## (undated) RX ORDER — LIDOCAINE HYDROCHLORIDE 10 MG/ML
INJECTION, SOLUTION EPIDURAL; INFILTRATION; INTRACAUDAL; PERINEURAL
Status: DISPENSED
Start: 2023-12-11

## (undated) RX ORDER — PROPOFOL 10 MG/ML
INJECTION, EMULSION INTRAVENOUS
Status: DISPENSED
Start: 2023-12-11

## (undated) RX ORDER — LIDOCAINE HYDROCHLORIDE 10 MG/ML
INJECTION, SOLUTION EPIDURAL; INFILTRATION; INTRACAUDAL; PERINEURAL
Status: DISPENSED
Start: 2024-06-14

## (undated) RX ORDER — ONDANSETRON 2 MG/ML
INJECTION INTRAMUSCULAR; INTRAVENOUS
Status: DISPENSED
Start: 2023-12-11

## (undated) RX ORDER — OXYCODONE HYDROCHLORIDE 5 MG/1
TABLET ORAL
Status: DISPENSED
Start: 2023-12-11